# Patient Record
Sex: MALE | Race: WHITE | Employment: FULL TIME | URBAN - METROPOLITAN AREA
[De-identification: names, ages, dates, MRNs, and addresses within clinical notes are randomized per-mention and may not be internally consistent; named-entity substitution may affect disease eponyms.]

---

## 2021-01-06 ENCOUNTER — TRANSFERRED RECORDS (OUTPATIENT)
Dept: HEALTH INFORMATION MANAGEMENT | Facility: CLINIC | Age: 51
End: 2021-01-06

## 2021-01-06 ASSESSMENT — MIFFLIN-ST. JEOR: SCORE: 1662.78

## 2021-01-07 ENCOUNTER — TRANSFERRED RECORDS (OUTPATIENT)
Dept: HEALTH INFORMATION MANAGEMENT | Facility: CLINIC | Age: 51
End: 2021-01-07

## 2021-01-08 ENCOUNTER — TRANSFERRED RECORDS (OUTPATIENT)
Dept: HEALTH INFORMATION MANAGEMENT | Facility: CLINIC | Age: 51
End: 2021-01-08

## 2021-01-12 ENCOUNTER — PREP FOR PROCEDURE (OUTPATIENT)
Dept: CARDIOLOGY | Facility: CLINIC | Age: 51
End: 2021-01-12

## 2021-01-12 ENCOUNTER — HOSPITAL ENCOUNTER (INPATIENT)
Facility: CLINIC | Age: 51
Setting detail: SURGERY ADMIT
End: 2021-01-12
Attending: SURGERY | Admitting: SURGERY
Payer: COMMERCIAL

## 2021-01-12 DIAGNOSIS — Z72.0 TOBACCO ABUSE: ICD-10-CM

## 2021-01-12 DIAGNOSIS — I25.10 CAD (CORONARY ARTERY DISEASE): Primary | ICD-10-CM

## 2021-01-12 DIAGNOSIS — Z01.810 PRE-OPERATIVE CARDIOVASCULAR EXAMINATION: Primary | ICD-10-CM

## 2021-01-12 DIAGNOSIS — E11.9 DIABETES MELLITUS, TYPE 2 (H): ICD-10-CM

## 2021-01-12 DIAGNOSIS — R06.02 SOB (SHORTNESS OF BREATH): ICD-10-CM

## 2021-01-13 DIAGNOSIS — I25.5 ISCHEMIC CARDIOMYOPATHY: Primary | ICD-10-CM

## 2021-01-13 DIAGNOSIS — Z11.59 ENCOUNTER FOR SCREENING FOR OTHER VIRAL DISEASES: Primary | ICD-10-CM

## 2021-01-13 RX ORDER — METOPROLOL SUCCINATE 25 MG/1
50 TABLET, EXTENDED RELEASE ORAL EVERY MORNING
Status: ON HOLD | COMMUNITY
End: 2021-02-01

## 2021-01-13 RX ORDER — POLYETHYLENE GLYCOL 3350 17 G/17G
1 POWDER, FOR SOLUTION ORAL DAILY PRN
COMMUNITY
End: 2021-01-20

## 2021-01-13 RX ORDER — NITROGLYCERIN 0.4 MG/1
0.4 TABLET SUBLINGUAL EVERY 5 MIN PRN
Status: ON HOLD | COMMUNITY
End: 2021-02-03

## 2021-01-13 RX ORDER — PANTOPRAZOLE SODIUM 40 MG/1
40 TABLET, DELAYED RELEASE ORAL DAILY
COMMUNITY
End: 2021-01-20

## 2021-01-13 RX ORDER — LIDOCAINE 40 MG/G
CREAM TOPICAL
Status: CANCELLED | OUTPATIENT
Start: 2021-01-13

## 2021-01-13 RX ORDER — POTASSIUM CHLORIDE 1500 MG/1
40 TABLET, EXTENDED RELEASE ORAL
Status: CANCELLED | OUTPATIENT
Start: 2021-01-13

## 2021-01-13 RX ORDER — HYDROCODONE BITARTRATE AND ACETAMINOPHEN 5; 325 MG/1; MG/1
1 TABLET ORAL EVERY 6 HOURS PRN
COMMUNITY
End: 2021-01-20

## 2021-01-13 RX ORDER — POTASSIUM CHLORIDE 1500 MG/1
20 TABLET, EXTENDED RELEASE ORAL
Status: CANCELLED | OUTPATIENT
Start: 2021-01-13

## 2021-01-13 RX ORDER — MAGNESIUM HYDROXIDE/ALUMINUM HYDROXICE/SIMETHICONE 120; 1200; 1200 MG/30ML; MG/30ML; MG/30ML
30 SUSPENSION ORAL EVERY 4 HOURS PRN
COMMUNITY
End: 2021-01-20

## 2021-01-13 RX ORDER — ASPIRIN 81 MG/1
81 TABLET ORAL EVERY MORNING
Status: ON HOLD | COMMUNITY
End: 2021-02-03

## 2021-01-13 RX ORDER — LISINOPRIL 5 MG/1
10 TABLET ORAL EVERY MORNING
Status: ON HOLD | COMMUNITY
End: 2021-02-01

## 2021-01-13 RX ORDER — ATORVASTATIN CALCIUM 40 MG/1
40 TABLET, FILM COATED ORAL EVERY MORNING
Status: ON HOLD | COMMUNITY
End: 2021-02-03

## 2021-01-13 RX ORDER — SODIUM CHLORIDE 9 MG/ML
INJECTION, SOLUTION INTRAVENOUS CONTINUOUS
Status: CANCELLED | OUTPATIENT
Start: 2021-01-13

## 2021-01-13 RX ORDER — CLOPIDOGREL BISULFATE 75 MG/1
75 TABLET ORAL EVERY MORNING
Status: ON HOLD | COMMUNITY
End: 2021-02-01

## 2021-01-13 NOTE — PROGRESS NOTES
Sent pre op letter to patient's daughter Jonathan via email.  Will also send education packet via email and bring to clinic on 01/19.  Entered patient's history and known medications to patient's chart.

## 2021-01-13 NOTE — TELEPHONE ENCOUNTER
FUTURE VISIT INFORMATION      SURGERY INFORMATION:    Date: 2021    Location: [facility] OR    Surgeon:  Jose Gibbons MD    Anesthesia Type:  CORONARY ARTERY BYPASS GRAFT (CABG)    Procedure: General    Consult: N/A    RECORDS REQUESTED FROM:       Primary Care Provider: N/A    Pertinent Medical History: CAD    Most recent EKG+ Tracin2021 (Epic)    Most recent ECHO: 2021 ([facility]) [facility]    Most recent Cardiac Stress Test: N/A    Most recent Coronary Angiogram: N/A    Most recent PFT's: N/A     Most recent Sleep Study:  N/A

## 2021-01-13 NOTE — LETTER
January 13, 2021        Dear MARGARITO Tejeda [facility]    CARDIOTHORACIC SURGERY PRE-OP INSTRUCTIONS  Your Heart Surgery is scheduled with Dr Jose Gibbons   On Friday 01/22/21 at 7:30 am.  Please arrive at 5:30 am.      Report to the information desk in the front lobby of the hospital at [address], [place] [id number]. When you walk in the main entrance of the hospital it is directly in front of you. Ask for an escort or the  can help direct you. You will then be escorted or directed to  on the third floor for your surgery preparation.     At this time due to the Coronavirus, no one is allowed to accompany you into the hospital the day of surgery and no visitors will be allowed during your stay.    Visiting policies will be strictly enforced.  This policy is subject to change as the COVID virus continues to evolve.    COVID 19 TESTING  You will be required to undergo COVID 19 testing prior the angiogram and your surgery.  You will be contacted to schedule this test within 48 hours of your surgery.      After the COVID test please protect yourself by following the CDC recommendations for disease prevention.  Wash hands regularly with soap and water and use hand  if soap and water is not available, wear a face mask, separate yourself from others by 6 feet and keep your hands away from your face.      Call your surgery coordinator Sofia Hernandez RN or the afterhours number ([phone]) if you develop any of the following symptoms; fever, cough, shortness of breath, sore throat, runny or stuffy nose, muscle or body aches, headaches, fatigue, vomiting or diarrhea.      You will spend approximately 5 - 7 days in the hospital depending on how quickly you recover.      INSTRUCTIONS PRIOR TO SURGERY    Please review this letter and the enclosed booklet and other information in this surgery folder carefully and call Sofia Hernandez RN at [phone] with any questions or  concerns.      MEDICATIONS    ASPIRIN is okay to take up to the day before your surgery but NOT the day of your surgery. Take your other medications as you normally would until the day of surgery unless instructed otherwise. If you do not take aspirin do not start aspirin unless instructed otherwise.  TAKE YOUR LAST DOSE OF ASPIRIN ON Thursday January 21st.    IF you are taking a blood thinner, (Coumadin, Warfarin, Plavix, Pradaxa, Effient, Brilinta, Pletal, Eliquis, Xarelto or other antiplatelet),  please inform your surgery team as soon as possible as  these medications may need to be stopped for several days (3-7) prior your surgery.    Take your last dose of Plavix on Saturday January 16th.       STOP TAKING these medications. STOP ALL ANTIINFLAMMATORY MEDICATIONS: (Ibuprofen, Aleve, Advil, Celebrex, Votaren, Ketoprofen, and Naproxen).  Stop ALL SUPPLEMENTS 10 days prior to your surgery. This includes stopping Co-Q 10, vitamin E and all fish oil supplements.   Please check the labels on any OTC eye vitamins you may be taking.  They often contain vitamin E and should be stopped 10 days prior to surgery.      HISTORY AND PHYSICAL:  LABS and IMAGING  All tests and procedures must be within 30 days of your surgery date.     You do NOT need to fast for the blood work.      You have a pre-op clinic visit beginning at 1:00 pm on Tuesday 01/19 in the [facility], [address] [place] [address]. A  or Coordinator will assist you. The Pre Assessment/Anesthesia Clinic is on the fifth floor.  The laboratory and radiology is on the first floor.      Your schedule is as follows:    01/19  1:00 pm PFTs (Pulmonary Function Test)  2:00 pm Labs  2:30 pm Clinic appointment with Dr Gibbons    01/20  10:00 am Vein Mapping  11:00 am Carotid Ultra Sound  12:00 pm Chest X-Ray  12:40 pm CT of the chest without contrast    1:10 pm EKG    3:00 pm PAC (Pre Assessment and Anesthesia Clinic)  This is  your pre op physical          DIABETIC  INSTRUCTIONS  If your primary care has given you instructions please follow those.  Otherwise  No oral diabetic medication the morning of surgery.  If you take long acting insulin in the evening, only take half of your dose. We will check your glucose upon arrival.      DO NOT EAT ANY SOLID FOODS AFTER MIDNIGHT or the morning of your surgery. NO MILK, MILK PRODUCTS, SMOOTHIES 8 HOURS PRIOR TO SURGERY.      DO NOT EAT ANYTHING, however you may have any clear liquids; black coffee (sugar okay), clear tea, water, sprite, ginger ale, apple juice, Gatorade or any clear liquid up to two hours before your surgery time. (No carmelita tea) No milk, or milk products, no smoothies or juice with pulp.       MEDICATIONS THE MORNING OF SURGERY  Take your metoprolol (Toprol XL) the morning of your surgery with a drink of clear liquid.  Please tell your anesthesiologist what medication you took and at what time.     BELONGINGS  Do not bring personal belongings, jewelry, money, valuables, toiletries or medications to the hospital the morning of your surgery. You may pack a bag and give it to a family member or friend to bring the following day or when needed.   Please remove all jewelry, including body piercings. Cautery instruments are used during surgery that may pose a risk of burns if a body piercing is left in during surgery.     Do bring a photo ID and insurance card.  A copy of your health care directives, if you have one.  Glasses and hearing aids (bring cases).  You cannot wear contact lenses during the surgery.  Inhaler(s) and eye drops if you use them, tell the staff about them when you arrive. Bring your CPAP machine or breathing device, if you use them.  If you have a pacemaker or ICD (cardiac defibrillator) bring the ID card.  If you have an implanted stimulator, bring the remote control.  If you are a legal guardian bring a copy of the certified (court-stamped) guardianship papers.       Call Rebecca our patient , with any questions or concerns about scheduling. She can be reached by phone at [phone] between 8 AM and 4:30PM Monday through Friday. Our answering service will  calls outside of those business hours.   If you have questions about your medications, test results or have a change in your health status call Sofia Hernandez RN at [phone] during regular business hours.      On weekends or after 4:30 please call [phone] and ask the  to page the Cardiothoracic Fellow, Nurse Practitioner, Physician Assistant or Staff on call that weekend.       Please feel free to call me or our office with any questions.     Thank you,    Sofia Hernandez RN  Cardiothoracic Surgery Coordinator  [phone]  Direct Phone  [phone]  Fax

## 2021-01-15 VITALS — WEIGHT: 185 LBS | BODY MASS INDEX: 29.03 KG/M2 | HEIGHT: 67 IN

## 2021-01-15 DIAGNOSIS — I25.10 CAD (CORONARY ARTERY DISEASE): Primary | ICD-10-CM

## 2021-01-18 ENCOUNTER — PRE VISIT (OUTPATIENT)
Dept: CARDIOLOGY | Facility: CLINIC | Age: 51
End: 2021-01-18

## 2021-01-18 ENCOUNTER — ANESTHESIA EVENT (OUTPATIENT)
Dept: SURGERY | Facility: CLINIC | Age: 51
DRG: 235 | End: 2021-01-18
Payer: COMMERCIAL

## 2021-01-19 ENCOUNTER — OFFICE VISIT (OUTPATIENT)
Dept: CARDIOLOGY | Facility: CLINIC | Age: 51
End: 2021-01-19
Attending: SURGERY
Payer: COMMERCIAL

## 2021-01-19 VITALS
DIASTOLIC BLOOD PRESSURE: 74 MMHG | HEART RATE: 48 BPM | SYSTOLIC BLOOD PRESSURE: 124 MMHG | HEIGHT: 70 IN | OXYGEN SATURATION: 99 % | BODY MASS INDEX: 29.2 KG/M2 | WEIGHT: 204 LBS

## 2021-01-19 DIAGNOSIS — E11.9 DIABETES MELLITUS, TYPE 2 (H): ICD-10-CM

## 2021-01-19 DIAGNOSIS — Z72.0 TOBACCO ABUSE: ICD-10-CM

## 2021-01-19 DIAGNOSIS — R06.02 SOB (SHORTNESS OF BREATH): ICD-10-CM

## 2021-01-19 DIAGNOSIS — Z01.810 PRE-OPERATIVE CARDIOVASCULAR EXAMINATION: ICD-10-CM

## 2021-01-19 DIAGNOSIS — Z11.59 ENCOUNTER FOR SCREENING FOR OTHER VIRAL DISEASES: ICD-10-CM

## 2021-01-19 DIAGNOSIS — E11.9 TYPE 2 DIABETES MELLITUS WITHOUT COMPLICATION, WITHOUT LONG-TERM CURRENT USE OF INSULIN (H): ICD-10-CM

## 2021-01-19 DIAGNOSIS — I25.118 CORONARY ARTERY DISEASE INVOLVING NATIVE CORONARY ARTERY OF NATIVE HEART WITH OTHER FORM OF ANGINA PECTORIS (H): Primary | ICD-10-CM

## 2021-01-19 LAB
ALBUMIN SERPL-MCNC: 3.2 G/DL (ref 3.4–5)
ALBUMIN UR-MCNC: 30 MG/DL
ALP SERPL-CCNC: 100 U/L (ref 40–150)
ALT SERPL W P-5'-P-CCNC: 14 U/L (ref 0–70)
ANION GAP SERPL CALCULATED.3IONS-SCNC: 4 MMOL/L (ref 3–14)
APPEARANCE UR: CLEAR
APTT PPP: 30 SEC (ref 22–37)
AST SERPL W P-5'-P-CCNC: 8 U/L (ref 0–45)
BILIRUB DIRECT SERPL-MCNC: 0.3 MG/DL (ref 0–0.2)
BILIRUB SERPL-MCNC: 0.7 MG/DL (ref 0.2–1.3)
BILIRUB UR QL STRIP: NEGATIVE
BUN SERPL-MCNC: 8 MG/DL (ref 7–30)
CALCIUM SERPL-MCNC: 8.9 MG/DL (ref 8.5–10.1)
CHLORIDE SERPL-SCNC: 106 MMOL/L (ref 94–109)
CO2 SERPL-SCNC: 30 MMOL/L (ref 20–32)
COLOR UR AUTO: YELLOW
CREAT SERPL-MCNC: 0.65 MG/DL (ref 0.66–1.25)
ERYTHROCYTE [DISTWIDTH] IN BLOOD BY AUTOMATED COUNT: 12 % (ref 10–15)
GFR SERPL CREATININE-BSD FRML MDRD: >90 ML/MIN/{1.73_M2}
GLUCOSE SERPL-MCNC: 102 MG/DL (ref 70–99)
GLUCOSE UR STRIP-MCNC: NEGATIVE MG/DL
HBA1C MFR BLD: 11.2 % (ref 0–5.6)
HCT VFR BLD AUTO: 51.1 % (ref 40–53)
HGB BLD-MCNC: 16.4 G/DL (ref 13.3–17.7)
HGB UR QL STRIP: NEGATIVE
INR PPP: 1.22 (ref 0.86–1.14)
KETONES UR STRIP-MCNC: NEGATIVE MG/DL
LEUKOCYTE ESTERASE UR QL STRIP: NEGATIVE
MCH RBC QN AUTO: 30.9 PG (ref 26.5–33)
MCHC RBC AUTO-ENTMCNC: 32.1 G/DL (ref 31.5–36.5)
MCV RBC AUTO: 96 FL (ref 78–100)
MUCOUS THREADS #/AREA URNS LPF: PRESENT /LPF
NITRATE UR QL: NEGATIVE
PH UR STRIP: 5 PH (ref 5–7)
PLATELET # BLD AUTO: 224 10E9/L (ref 150–450)
POTASSIUM SERPL-SCNC: 3.4 MMOL/L (ref 3.4–5.3)
PREALB SERPL IA-MCNC: 15 MG/DL (ref 15–45)
PROT SERPL-MCNC: 7.2 G/DL (ref 6.8–8.8)
RBC # BLD AUTO: 5.3 10E12/L (ref 4.4–5.9)
RBC #/AREA URNS AUTO: 1 /HPF (ref 0–2)
SARS-COV-2 RNA RESP QL NAA+PROBE: NORMAL
SODIUM SERPL-SCNC: 141 MMOL/L (ref 133–144)
SOURCE: ABNORMAL
SP GR UR STRIP: 1.02 (ref 1–1.03)
SPECIMEN SOURCE: NORMAL
SQUAMOUS #/AREA URNS AUTO: <1 /HPF (ref 0–1)
UROBILINOGEN UR STRIP-MCNC: 0 MG/DL (ref 0–2)
WBC # BLD AUTO: 10.9 10E9/L (ref 4–11)
WBC #/AREA URNS AUTO: 4 /HPF (ref 0–5)

## 2021-01-19 PROCEDURE — 83036 HEMOGLOBIN GLYCOSYLATED A1C: CPT | Performed by: PATHOLOGY

## 2021-01-19 PROCEDURE — 99205 OFFICE O/P NEW HI 60 MIN: CPT | Performed by: SURGERY

## 2021-01-19 PROCEDURE — 85730 THROMBOPLASTIN TIME PARTIAL: CPT | Performed by: PATHOLOGY

## 2021-01-19 PROCEDURE — U0003 INFECTIOUS AGENT DETECTION BY NUCLEIC ACID (DNA OR RNA); SEVERE ACUTE RESPIRATORY SYNDROME CORONAVIRUS 2 (SARS-COV-2) (CORONAVIRUS DISEASE [COVID-19]), AMPLIFIED PROBE TECHNIQUE, MAKING USE OF HIGH THROUGHPUT TECHNOLOGIES AS DESCRIBED BY CMS-2020-01-R: HCPCS | Performed by: PATHOLOGY

## 2021-01-19 PROCEDURE — 80076 HEPATIC FUNCTION PANEL: CPT | Performed by: PATHOLOGY

## 2021-01-19 PROCEDURE — 84134 ASSAY OF PREALBUMIN: CPT | Performed by: PATHOLOGY

## 2021-01-19 PROCEDURE — 86923 COMPATIBILITY TEST ELECTRIC: CPT | Performed by: PATHOLOGY

## 2021-01-19 PROCEDURE — 36415 COLL VENOUS BLD VENIPUNCTURE: CPT | Performed by: PATHOLOGY

## 2021-01-19 PROCEDURE — 94726 PLETHYSMOGRAPHY LUNG VOLUMES: CPT | Performed by: INTERNAL MEDICINE

## 2021-01-19 PROCEDURE — 81001 URINALYSIS AUTO W/SCOPE: CPT | Performed by: PATHOLOGY

## 2021-01-19 PROCEDURE — 86900 BLOOD TYPING SEROLOGIC ABO: CPT | Performed by: PATHOLOGY

## 2021-01-19 PROCEDURE — U0005 INFEC AGEN DETEC AMPLI PROBE: HCPCS | Performed by: PATHOLOGY

## 2021-01-19 PROCEDURE — 80048 BASIC METABOLIC PNL TOTAL CA: CPT | Performed by: PATHOLOGY

## 2021-01-19 PROCEDURE — 85610 PROTHROMBIN TIME: CPT | Performed by: PATHOLOGY

## 2021-01-19 PROCEDURE — 94375 RESPIRATORY FLOW VOLUME LOOP: CPT | Performed by: INTERNAL MEDICINE

## 2021-01-19 PROCEDURE — 85027 COMPLETE CBC AUTOMATED: CPT | Performed by: PATHOLOGY

## 2021-01-19 PROCEDURE — G0463 HOSPITAL OUTPT CLINIC VISIT: HCPCS

## 2021-01-19 PROCEDURE — 86901 BLOOD TYPING SEROLOGIC RH(D): CPT | Performed by: PATHOLOGY

## 2021-01-19 PROCEDURE — 86850 RBC ANTIBODY SCREEN: CPT | Performed by: PATHOLOGY

## 2021-01-19 PROCEDURE — 94729 DIFFUSING CAPACITY: CPT | Performed by: INTERNAL MEDICINE

## 2021-01-19 SDOH — HEALTH STABILITY: MENTAL HEALTH: HOW OFTEN DO YOU HAVE A DRINK CONTAINING ALCOHOL?: 4 OR MORE TIMES A WEEK

## 2021-01-19 SDOH — HEALTH STABILITY: MENTAL HEALTH: HOW OFTEN DO YOU HAVE 6 OR MORE DRINKS ON ONE OCCASION?: NOT ASKED

## 2021-01-19 SDOH — HEALTH STABILITY: MENTAL HEALTH: HOW MANY STANDARD DRINKS CONTAINING ALCOHOL DO YOU HAVE ON A TYPICAL DAY?: NOT ASKED

## 2021-01-19 ASSESSMENT — MIFFLIN-ST. JEOR: SCORE: 1796.59

## 2021-01-19 ASSESSMENT — PAIN SCALES - GENERAL: PAINLEVEL: NO PAIN (0)

## 2021-01-19 NOTE — LETTER
1/19/2021      RE: Jaxon Melendez  [address] [address]       Dear Colleague,    Thank you for the opportunity to participate in the care of your patient, Jaxon Melendez, at the [facility]. Please see a copy of my visit note below.    HISTORY OF PRESENT ILLNESS:  I was requested to se patient for evaluation of his suItability for CABG,Patient with history of HLD, HTN, DM II, and CAD s/p PCI to RCA and LCx in 2010. He continued to smoke. He was more recently seen with retrosternal chest pain excerbated by exertion, relieved with rest. Troponins were elevated 25-69. He was treated for acute coronary syndrome, and taken to the cath lab where he was found to have severe multi vessel disease including in-stent restenosis. His EF was approximately 10%.      His history is also significant for hidradenitis suppurativa in the presacral area and regular alcohol use 6-7 drinks daily.      PAST MEDICAL HISTORY:  Reviewed with patient on 01/21/2021        Past Medical History:   Diagnosis Date     Acute systolic heart failure (H)       EF 10%     CAD (coronary artery disease)       Diabetes mellitus, type 2 (H)       uncontrolled     Essential hypertension       Hidradenitis suppurativa       presacral area     Hyperlipidemia LDL goal <100       NSTEMI (non-ST elevated myocardial infarction) (H)                 Past Surgical History:   Procedure Laterality Date     ANGIOGRAM         2010, 2021         MEDICATIONS:  PTA Meds         Prior to Admission medications    Medication Sig Last Dose Taking? Auth Provider   Acetaminophen (TYLENOL) 325 MG CAPS Take 325-650 mg by mouth every 4 hours as needed Past Month at Unknown time Yes Reported, Patient   aspirin 81 MG EC tablet Take 81 mg by mouth every morning  1/20/2021 at 0800 Yes Reported, Patient   atorvastatin (LIPITOR) 40 MG tablet Take 40 mg by mouth every morning  1/20/2021 at 0800  Yes Reported, Patient   insulin glargine (LANTUS PEN) 100 UNIT/ML pen Inject 25 Units Subcutaneous every evening  1/20/2021 at 1800 Yes Reported, Patient   insulin lispro (HUMALOG KWIKPEN) 100 UNIT/ML (1 unit dial) KWIKPEN Inject 10 Units Subcutaneous 3 times daily (before meals) 1/20/2021 at 1800 Yes Reported, Patient   lisinopril (ZESTRIL) 5 MG tablet Take 10 mg by mouth every morning  1/20/2021 at 0800 Yes Reported, Patient   metFORMIN (GLUCOPHAGE) 500 MG tablet Take 500 mg by mouth daily (with breakfast) 1/20/2021 at 0800 Yes Reported, Patient   metoprolol succinate ER (TOPROL-XL) 25 MG 24 hr tablet Take 50 mg by mouth every morning  1/20/2021 at 0800 Yes Reported, Patient   clopidogrel (PLAVIX) 75 MG tablet Take 75 mg by mouth every morning Pt states he is not taking this medication.  CTRN  1/21/21 Unknown at Unknown time   Reported, Patient   dulaglutide (TRULICITY) 0.75 MG/0.5ML pen Inject 0.75 mg Subcutaneous every 7 days Pt is unaware of this med.  He is not taking. Unknown at Unknown time   Reported, Patient   nitroGLYcerin (NITROSTAT) 0.4 MG sublingual tablet Place 0.4 mg under the tongue every 5 minutes as needed for chest pain For chest pain place 1 tablet under the tongue every 5 minutes for 3 doses. If symptoms persist 5 minutes after 1st dose call 911. Unknown at Unknown time   Reported, Patient      Current Meds    [START ON 1/22/2021] aspirin  81 mg Oral QAM     [START ON 1/22/2021] atorvastatin  40 mg Oral QAM     atropine           heparin ANTICOAGULANT  5,000 Units Subcutaneous Q12H     insulin aspart  1-7 Units Subcutaneous TID AC     insulin aspart  1-5 Units Subcutaneous At Bedtime     insulin glargine  10 Units Subcutaneous At Bedtime     [START ON 1/22/2021] metoprolol succinate ER  50 mg Oral QAM     sodium chloride (PF)  3 mL Intracatheter Q8H      Infusion Meds    - MEDICATION INSTRUCTIONS -       Reason anticoagulation order not selected           ALLERGIES:    No Known  Allergies     REVIEW OF SYSTEMS:  A 10 point review of systems was negative other than mentioned in history and physical exam.     SOCIAL HISTORY:   Social History            Socioeconomic History     Marital status:        Spouse name: Not on file     Number of children: Not on file     Years of education: Not on file     Highest education level: Not on file   Occupational History     Not on file   Social Needs     Financial resource strain: Not on file     Food insecurity       Worry: Not on file       Inability: Not on file     Transportation needs       Medical: Not on file       Non-medical: Not on file   Tobacco Use     Smoking status: Current Every Day Smoker       Packs/day: 1.00       Years: 25.00       Pack years: 25.00       Types: Cigarettes     Smokeless tobacco: Never Used     Tobacco comment: Now down to 1/2 PPD smoking   Substance and Sexual Activity     Alcohol use: Yes       Comment: 6-7 mixed drinks nightly     Drug use: Never     Sexual activity: Not on file   Lifestyle     Physical activity       Days per week: Not on file       Minutes per session: Not on file     Stress: Not on file   Relationships     Social connections       Talks on phone: Not on file       Gets together: Not on file       Attends Voodoo service: Not on file       Active member of club or organization: Not on file       Attends meetings of clubs or organizations: Not on file       Relationship status: Not on file     Intimate partner violence       Fear of current or ex partner: Not on file       Emotionally abused: Not on file       Physically abused: Not on file       Forced sexual activity: Not on file   Other Topics Concern     Not on file   Social History Narrative     Not on file            FAMILY MEDICAL HISTORY:         Family History   Problem Relation Age of Onset     Brain Tumor Mother       Heart Disease Other       Diabetes Other              PHYSICAL EXAM:   Temp  Av.3  F (36.8  C)  Min: 98.1   F (36.7  C)  Max: 98.4  F (36.9  C)      Pulse  Av.5  Min: 50  Max: 59 Resp  Av  Min: 12  Max: 16  SpO2  Av %  Min: 96 %  Max: 98 %       /89 (BP Location: Right arm)   Pulse (!) 47   Resp 16   SpO2 97%       GENERAL APPEARANCE: Alert and NAD  Head: NC/AT  NECK: Supple, no goiter  Pulmonary: Lungs clear to auscultation with equal breath sounds bilaterally, no clubbing or cyanosis  CV: Sinus bradycardia , normal rate, no rub.   GI: Soft, nontender, normal bowel sounds, no HSM   SKIN: No rash, warm, dry  NEURO: Mentation intact and speech normal.      LABS:   CMP      Recent Labs   Lab 21  1416      POTASSIUM 3.4   CHLORIDE 106   CO2 30   ANIONGAP 4   *   BUN 8   CR 0.65*   GFRESTIMATED >90   GFRESTBLACK >90   RICHARD 8.9   PROTTOTAL 7.2   ALBUMIN 3.2*   BILITOTAL 0.7   ALKPHOS 100   AST 8   ALT 14      CBC      Recent Labs   Lab 21  1416   HGB 16.4   WBC 10.9   RBC 5.30   HCT 51.1   MCV 96   MCH 30.9   MCHC 32.1   RDW 12.0         INR      Recent Labs   Lab 21  1416   INR 1.22*   PTT 30      ABGNo lab results found in last 7 days.      IMAGING:     CT chest without contrast     1. Mild cardiomegaly with left ventricular enlargement and evidence of  prior myocardial infarction/s. Severe coronary artery calcifications.  2. No acute airspace disease.     EKG  Sinus bradycardia, normal axis, poor R wave progression          ASSESSMENT AND PLAN:   50  Year old male with history of HLD, HTN, DM II, smoking, and CAD s/p PCI to RCA and LCx in , now with severe CAD with severe LV dysfunction. Patient was referred to me by Dr Houston for high risk CABG. On close review of his MRI, there is adequate viability of LAD and circumflex territory to support doing CABG. I discussed the risks and benefits of CABG with patient and daughter including risks of death, bleeding, stroke, infection, renal failure, arrhythmias and the need for mechanical circulatory support. They  understand and are willing for me to proceed with surgery. Will admit to ICU for swan and IABP prior to surgery.    I spent a total of 65 minutes which included patient visit, review of outside tests and imaging and interpretation of tests, discussion with referral cardiologist and documentation.      Please do not hesitate to contact me if you have any questions/concerns.     Sincerely,     Jose Gibbons MD

## 2021-01-19 NOTE — NURSING NOTE
Patient seen today for consultation for CABG with low EF, high A1C and current everyday smoker.    Surgery procedure explained to patient and his daughter Manda and all questions and concerns were answered and addressed.     Risks and benefits of surgery were discussed with patient (and all present) including risk of death, stroke, bleeding, cardiac ischemia, wound infection, renal failure, arrhythmias and possible pacemaker implantation. Patient accepts these risks and is willing to proceed with surgery.     Reviewed pre surgery tests and procedures needed and patient will complete tests tomorrow.     Pre surgery preparation folder with instructions for surgery preparation emailed to patient and daughter.     Instructed patient on preparation for balloon pump on Thursday.     Virtual visit set up with [facility]ah representative via video phone and advanced directives were signed and emailed to Honoring Sofi.     Patient and daughter verbalized understanding of all instructions and will call with any questions or concerns.

## 2021-01-19 NOTE — NURSING NOTE
Chief Complaint   Patient presents with     New Patient     New consult for CABG with EF 15%     Rhoda Figueroa

## 2021-01-20 ENCOUNTER — TELEPHONE (OUTPATIENT)
Dept: CARDIOLOGY | Facility: CLINIC | Age: 51
End: 2021-01-20

## 2021-01-20 ENCOUNTER — PRE VISIT (OUTPATIENT)
Dept: SURGERY | Facility: CLINIC | Age: 51
End: 2021-01-20

## 2021-01-20 ENCOUNTER — OFFICE VISIT (OUTPATIENT)
Dept: SURGERY | Facility: CLINIC | Age: 51
End: 2021-01-20
Payer: COMMERCIAL

## 2021-01-20 ENCOUNTER — ANCILLARY PROCEDURE (OUTPATIENT)
Dept: GENERAL RADIOLOGY | Facility: CLINIC | Age: 51
End: 2021-01-20
Attending: SURGERY
Payer: COMMERCIAL

## 2021-01-20 ENCOUNTER — ANCILLARY PROCEDURE (OUTPATIENT)
Dept: CT IMAGING | Facility: CLINIC | Age: 51
End: 2021-01-20
Attending: SURGERY
Payer: COMMERCIAL

## 2021-01-20 ENCOUNTER — ANCILLARY PROCEDURE (OUTPATIENT)
Dept: ULTRASOUND IMAGING | Facility: CLINIC | Age: 51
End: 2021-01-20
Attending: SURGERY
Payer: COMMERCIAL

## 2021-01-20 ENCOUNTER — DOCUMENTATION ONLY (OUTPATIENT)
Dept: OTHER | Facility: CLINIC | Age: 51
End: 2021-01-20

## 2021-01-20 VITALS
HEART RATE: 50 BPM | BODY MASS INDEX: 29.06 KG/M2 | DIASTOLIC BLOOD PRESSURE: 72 MMHG | WEIGHT: 203 LBS | TEMPERATURE: 98.1 F | RESPIRATION RATE: 12 BRPM | OXYGEN SATURATION: 98 % | SYSTOLIC BLOOD PRESSURE: 108 MMHG | HEIGHT: 70 IN

## 2021-01-20 DIAGNOSIS — Z01.810 PRE-OPERATIVE CARDIOVASCULAR EXAMINATION: ICD-10-CM

## 2021-01-20 DIAGNOSIS — Z01.818 PREOP EXAMINATION: Primary | ICD-10-CM

## 2021-01-20 LAB
DLCOUNC-%PRED-PRE: 84 %
DLCOUNC-PRE: 24.88 ML/MIN/MMHG
DLCOUNC-PRED: 29.54 ML/MIN/MMHG
ERV-%PRED-PRE: 72 %
ERV-PRE: 0.9 L
ERV-PRED: 1.24 L
EXPTIME-PRE: 6.82 SEC
FEF2575-%PRED-PRE: 111 %
FEF2575-PRE: 4.07 L/SEC
FEF2575-PRED: 3.64 L/SEC
FEFMAX-%PRED-PRE: 94 %
FEFMAX-PRE: 9.3 L/SEC
FEFMAX-PRED: 9.87 L/SEC
FEV1-%PRED-PRE: 78 %
FEV1-PRE: 3.09 L
FEV1FEV6-PRE: 85 %
FEV1FEV6-PRED: 81 %
FEV1FVC-PRE: 86 %
FEV1FVC-PRED: 79 %
FEV1SVC-PRE: 83 %
FEV1SVC-PRED: 76 %
FIFMAX-PRE: 4.73 L/SEC
FRCPLETH-%PRED-PRE: 74 %
FRCPLETH-PRE: 2.62 L
FRCPLETH-PRED: 3.51 L
FVC-%PRED-PRE: 72 %
FVC-PRE: 3.61 L
FVC-PRED: 5.02 L
IC-%PRED-PRE: 70 %
IC-PRE: 2.81 L
IC-PRED: 4 L
LABORATORY COMMENT REPORT: NORMAL
RADIOLOGIST FLAGS: ABNORMAL
RVPLETH-%PRED-PRE: 78 %
RVPLETH-PRE: 1.72 L
RVPLETH-PRED: 2.18 L
SARS-COV-2 RNA RESP QL NAA+PROBE: NEGATIVE
SPECIMEN SOURCE: NORMAL
TLCPLETH-%PRED-PRE: 76 %
TLCPLETH-PRE: 5.43 L
TLCPLETH-PRED: 7.13 L
VA-%PRED-PRE: 78 %
VA-PRE: 5.18 L
VC-%PRED-PRE: 70 %
VC-PRE: 3.71 L
VC-PRED: 5.24 L

## 2021-01-20 PROCEDURE — 71046 X-RAY EXAM CHEST 2 VIEWS: CPT | Performed by: RADIOLOGY

## 2021-01-20 PROCEDURE — 93970 EXTREMITY STUDY: CPT | Performed by: RADIOLOGY

## 2021-01-20 PROCEDURE — 99204 OFFICE O/P NEW MOD 45 MIN: CPT | Performed by: CLINICAL NURSE SPECIALIST

## 2021-01-20 PROCEDURE — 71250 CT THORAX DX C-: CPT | Performed by: RADIOLOGY

## 2021-01-20 PROCEDURE — 93880 EXTRACRANIAL BILAT STUDY: CPT | Performed by: RADIOLOGY

## 2021-01-20 RX ORDER — ASPIRIN 81 MG/1
81 TABLET, CHEWABLE ORAL
Status: CANCELLED | OUTPATIENT
Start: 2021-01-20

## 2021-01-20 RX ORDER — GABAPENTIN 300 MG/1
300 CAPSULE ORAL
Status: CANCELLED | OUTPATIENT
Start: 2021-01-20

## 2021-01-20 RX ORDER — LIDOCAINE 40 MG/G
CREAM TOPICAL
Status: CANCELLED | OUTPATIENT
Start: 2021-01-20

## 2021-01-20 RX ORDER — ASPIRIN 81 MG/1
162 TABLET, CHEWABLE ORAL
Status: CANCELLED | OUTPATIENT
Start: 2021-01-20

## 2021-01-20 RX ORDER — CEFAZOLIN SODIUM 1 G/50ML
1 INJECTION, SOLUTION INTRAVENOUS SEE ADMIN INSTRUCTIONS
Status: CANCELLED | OUTPATIENT
Start: 2021-01-20

## 2021-01-20 RX ORDER — FAMOTIDINE 20 MG/1
20 TABLET, FILM COATED ORAL
Status: CANCELLED | OUTPATIENT
Start: 2021-01-20

## 2021-01-20 RX ORDER — ACETAMINOPHEN 325 MG/1
975 TABLET ORAL ONCE
Status: CANCELLED | OUTPATIENT
Start: 2021-01-20 | End: 2021-01-20

## 2021-01-20 RX ORDER — CEFAZOLIN SODIUM 2 G/50ML
2 SOLUTION INTRAVENOUS
Status: CANCELLED | OUTPATIENT
Start: 2021-01-20

## 2021-01-20 RX ORDER — DEXMEDETOMIDINE HYDROCHLORIDE 4 UG/ML
0.2-1.2 INJECTION, SOLUTION INTRAVENOUS CONTINUOUS
Status: CANCELLED | OUTPATIENT
Start: 2021-01-20

## 2021-01-20 RX ORDER — CHLORHEXIDINE GLUCONATE ORAL RINSE 1.2 MG/ML
10 SOLUTION DENTAL ONCE
Status: CANCELLED | OUTPATIENT
Start: 2021-01-20 | End: 2021-01-20

## 2021-01-20 ASSESSMENT — PAIN SCALES - GENERAL: PAINLEVEL: NO PAIN (0)

## 2021-01-20 ASSESSMENT — MIFFLIN-ST. JEOR: SCORE: 1792.05

## 2021-01-20 ASSESSMENT — LIFESTYLE VARIABLES: TOBACCO_USE: 1

## 2021-01-20 NOTE — TELEPHONE ENCOUNTER
Called and spoke to imaging services and this is in regard to patient's L side carotid artery being > 70% blocked.  Imaging staff also stated they spoke to Dr Gibbons regarding these findings.  Dr Gibbons spoke to Dr Campbell and he recommends going forward with patient's CABG.

## 2021-01-20 NOTE — ANESTHESIA PREPROCEDURE EVALUATION
"Anesthesia Pre-Procedure Evaluation    Patient: Jaxon Melendez   MRN:     [id number] Gender:   male   Age:    50 year old :      1970        Preoperative Diagnosis: CAD (coronary artery disease) [I25.10]   Procedure(s):  CORONARY ARTERY BYPASS GRAFT (CABG)     LABS:  CBC:   Lab Results   Component Value Date    WBC 10.9 2021    HGB 16.4 2021    HCT 51.1 2021     2021     BMP:   Lab Results   Component Value Date     2021    POTASSIUM 3.4 2021    CHLORIDE 106 2021    CO2 30 2021    BUN 8 2021    CR 0.65 (L) 2021     (H) 2021     COAGS:   Lab Results   Component Value Date    PTT 30 2021    INR 1.22 (H) 2021     POC: No results found for: BGM, HCG, HCGS  OTHER:   Lab Results   Component Value Date    A1C 11.2 (H) 2021    RICHARD 8.9 2021    ALBUMIN 3.2 (L) 2021    PROTTOTAL 7.2 2021    ALT 14 2021    AST 8 2021    ALKPHOS 100 2021    BILITOTAL 0.7 2021        Preop Vitals    BP Readings from Last 3 Encounters:   21 124/74    Pulse Readings from Last 3 Encounters:   21 (!) 48      Resp Readings from Last 3 Encounters:   No data found for Resp    SpO2 Readings from Last 3 Encounters:   21 99%      Temp Readings from Last 1 Encounters:   No data found for Temp    Ht Readings from Last 1 Encounters:   21 1.778 m (5' 10\")      Wt Readings from Last 1 Encounters:   21 92.5 kg (204 lb)    Estimated body mass index is 29.27 kg/m  as calculated from the following:    Height as of 21: 1.778 m (5' 10\").    Weight as of 21: 92.5 kg (204 lb).     LDA:        Past Medical History:   Diagnosis Date     Acute systolic heart failure (H)     EF 10%     CAD (coronary artery disease)      Diabetes mellitus, type 2 (H)     uncontrolled     Essential hypertension      Hidradenitis suppurativa     presacral area     Hyperlipidemia LDL goal <100      " NSTEMI (non-ST elevated myocardial infarction) (H)       No past surgical history on file.   No Known Allergies     Anesthesia Evaluation     . Pt has had prior anesthetic. Type: MAC.    No history of anesthetic complications          ROS/MED HX    ENT/Pulmonary:     (+) MING risk factors, hypertension, tobacco use, Current use,  (-) recent URI   Neurologic:  - neg neurologic ROS     Cardiovascular: Comment: Walked from hospital to [facility] today in cold and had some shortness of breath and mild chest pain.     (+) Dyslipidemia hypertension-range: 108/72 today/ Peripheral Vascular Disease-- Other. CAD -past MI -stent-2010. 2 Taking blood thinners Pt has received instructions: Instructions Given to patient: Last dose of ASA 1/21, last dose of Plavix 1/16/21. CHF etiology: ischemic Last EF: 10-15% date: 1/6/21 JAVED. Previous cardiac testing   Echo: Date: 1/6/21 Results:    Stress Test: Date: Results:    ECG Reviewed: Date: 1/20/21 Results:  SB, nonspecific intraventricular conduction block, T wave abnormality  Cath: Date: 1/7/21 Results:        METS/Exercise Tolerance:  3 - Able to walk 1-2 blocks without stopping   Hematologic:  - neg hematologic  ROS       Musculoskeletal:  - neg musculoskeletal ROS       GI/Hepatic:     (+) GERD, Asymptomatic on medication,       Renal/Genitourinary:  - neg Renal ROS       Endo:     (+) type II DM, Last HgA1c: 11.2, date: 1/20/21, Using insulin, - not using insulin pump. Normal glucose range: doesn't monitor regularly, this am 100, Diabetic complications: cardiac problems.    (-) Type I DM   Psychiatric:  - neg psychiatric ROS    (-) chronic opioid use history   Infectious Disease:  - neg infectious disease ROS       Malignancy:       Other:    (+) , no H/O Chronic Pain,                       PHYSICAL EXAM:   Mental Status/Neuro: A/A/O; Age Appropriate   Airway: Facies: Feasible  Mallampati: I  Mouth/Opening: Full  TM distance: > 6 cm  Neck ROM: Full   Respiratory: Auscultation: CTAB      Resp. Rate: Normal     Resp. Effort: Normal      CV: Rhythm: Regular  Heart: Normal Sounds  Edema: None   Comments:      Dental: Normal Dentition                Assessment:   ASA SCORE: 3    H&P: History and physical reviewed and following examination; no interval change.    NPO Status: NPO Appropriate     Plan:   Anes. Type:  General   Pre-Medication: None   Induction:  IV (Standard)   Airway: ETT; Oral   Access/Monitoring: PIV; 2nd PIV; A-Line; PAC; CVL   Maintenance: Balanced     Postop Plan:   Postop Pain: Opioids  Postop Sedation/Airway: Not planned     PONV Management:   Adult Risk Factors:, Postop Opioids   Prevention: Ondansetron     CONSENT: Direct conversation   Plan and risks discussed with: Patient   Blood Products: Consented (ALL Blood Products)             [unfilled]  PAC Discussion and Assessment    ASA Classification: 3  Case is suitable for: Mabie  Anesthetic techniques and relevant risks discussed: GA and GA with regional block for post-op pain control  Invasive monitoring and risk discussed: No    Possibility and Risk of blood transfusion discussed: Yes            PAC Resident/NP Anesthesia Assessment: Jaxon Melendez is a 50 year old male scheduled to undergo CORONARY ARTERY BYPASS GRAFT (CABG) with Dr. Gibbons on 1/22/21. He has the following specific operative considerations:   - RCRI : High risk surgery (>5% cardiac complication risk).    - VTE risk: 0.5%  - MING # of risks 2/8 = Low risk  - Risk of PONV score = 1.  If > 2, anti-emetic intervention recommended.    --CAD s/p PCI to RCA and LCx in 2010, now with STEMI and angiogram with multivessel disease and in-stent restenosis. EF ~10%. Has been counseled for above procedure with plan for IABP day before surgery. ASA 81 mg daily and will remain on up to DOS. Last dose of Plavix 1/16/21. DYSPNEA ON EXERTION and mild chest pain when walking in cold today.   --HLD atorvastatin. HTN. lisinopril. metoprolol. BP today 108/72. All testing  above. 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   --Current smoker now 1/2 PPD with history of 25 pack years. Denies pulmonary symptoms. Lungs CTA.   --GERD. Will take Protonix on DOS.  --DIABETES MELLITUS II. New diagnosis. Last A1C 11.2. Humalog insulin 10 units with meals, metformin and Lantus insulin 25 units at 6 pm. Is not monitoring blood sugar regularly. Denies use of Trulicity today.   --Regular alcohol use. 6-7 mixed drinks nightly. Denies history of withdrawal. LFTs normal. INR 1.22.  --Type and screen drawn by service.     Patient was discussed with Dr Hung.    Reviewed and Signed by PAC Mid-Level Provider/Resident  Mid-Level Provider/Resident: LOUIS Perez, MAXI  Date: 1/20/21  Time: 12:08pm                                LOUIS Warren

## 2021-01-20 NOTE — TELEPHONE ENCOUNTER
[facility] Call Center    Phone Message    May a detailed message be left on voicemail: yes     Reason for Call: Other: Stefanie from imaging would like a call back to discuss a finding     Action Taken: Message routed to:  [facility] ([facility]): Cardio    Travel Screening: Not Applicable

## 2021-01-20 NOTE — PATIENT INSTRUCTIONS
Preparing for Your Surgery      Name:  Jaxon Melendez   MRN:  [id number]   :  1970   Today's Date:  2021       Arriving for surgery:  Surgery date:  2021  Arrival time:  5:30 am    Please note---you will admitted to the hospital after Your Cardiac Cath lab appointment on 21    Restrictions due to COVID 19:  One consistent visitor per patient is allowed  No ill visitors  All visitors must wear face mask     parking is available for anyone with mobility limitations or disabilities.  ([facility]  24 hours/ 7 days a week; [employer]  7 am- 3:30 pm, Mon- Fri)    Please come to:       [facility] [address]  [address] [id number]       -    Please proceed to the Surgery Lounge on the 3rd floor. [phone]?     - ?If you are in need of directions, wheelchair or escort please stop at the Information Desk in the lobby.  ?     What can I eat or drink?  -  You may eat and drink normally for up to 8 hours before your surgery. (Until 11 pm)  -  You may have clear liquids until 2 hours before surgery. (Until 5:30 am )    Examples of clear liquids:  Water  Clear broth  Juices (apple, white grape, white cranberry  and cider) without pulp  Noncarbonated, powder based beverages  (lemonade and Tyler-Aid)  Sodas (Sprite, 7-Up, ginger ale and seltzer)  Coffee or tea (without milk or cream)  Gatorade    -  No Alcohol for at least 24 hours before surgery     Which medicines can I take?    Hold Multivitamins for 7 days before surgery.  Hold Supplements for 7 days before surgery.  Hold Ibuprofen (Advil, Motrin) for 1 day before surgery--unless otherwise directed by surgeon.  Hold Naproxen (Aleve) for 4 days before surgery.    Take last dose of aspirin on 21    Plan for Plavix (clopidigrel)---take last dose on 2021          How do I prepare myself?  - Please take 2 showers before surgery using Scrubcare or Hibiclens soap.    Use this soap only from the  neck to your toes.     Leave the soap on your skin for one minute--then rinse thoroughly.      You may use your own shampoo and conditioner; no other hair products.   - Please remove all jewelry and body piercings.  - No lotions, deodorants or fragrance.  - No makeup or fingernail polish.   - Bring your ID and insurance card.    - All patients are required to have a Covid-19 test within 4 days of surgery/procedure.      -Patients will be contacted by the [facility] scheduling team within 1 week of surgery to make an appointment.      - Patients may call the Scheduling team at [phone] if they have not been scheduled within 4 days of  surgery.      Questions or Concerns:    - For any questions regarding the day of surgery or your hospital stay, please contact the Pre Admission Nursing Office at [phone].       - If you have health changes between today and your surgery please call your surgeon.       For questions after surgery please call your surgeons office.     Sofia Hernandez RN  Cardiothoracic Surgery Coordinator  [phone]  Direct Phone  [phone]  Fax

## 2021-01-20 NOTE — TELEPHONE ENCOUNTER
I notified Sofia PRIEST RN for Dr Gibbons regarding this message and she will contact Debbi. I will also route this message to the Surgery pool for further review.    Awais CLINTON

## 2021-01-20 NOTE — H&P
Pre-Operative H & P     CC:  Preoperative exam to assess for increased cardiopulmonary risk while undergoing surgery and anesthesia.    Date of Encounter: 1/20/2021  Primary Care Physician:  No primary care provider on file.  Reason for visit: CAD (coronary artery disease) [I25.10]    HPI  Jaxon Melendez is a 50 year old male who presents for pre-operative H & P in preparation for CORONARY ARTERY BYPASS GRAFT (CABG) with Dr. Gibbons on 1/22/21 at [facility]. History is obtained from the patient and medical records.     Patient with history of HLD, HTN, DM II, and CAD s/p PCI to RCA and LCx in 2010. He continued to smoke. He was more recently seen with retrosternal chest pain excerbated by exertion, relieved with rest. Troponins were elevated 25-69. He was treated for acute coronary syndrome, and taken to the cath lab where he was found to have severe multi vessel disease including in-stent restenosis. His EF was approximately 10%. Due to potential need for extensive mechanical circulatory support he was referred to Dr. Gibbons and has been counseled for above procedure. The plan includes admission the day prior to surgery for placement of IABP.    His history is also significant for hidradenitis suppurativa in the presacral area and regular alcohol use 6-7 drinks daily.     Today patient denies fever, cough, irregular HR, or ankle edema. He had testing today and reports walking from the hospital to the [facility] in the cold. He experienced some shortness of breath in the cold with some mild chest pain similar to what he has had in the past. 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     Past Medical History  Past Medical History:   Diagnosis Date     Acute systolic heart failure (H)     EF 10%     CAD (coronary artery disease)      Diabetes mellitus, type 2 (H)     uncontrolled     Essential hypertension      Hidradenitis suppurativa     presacral area     Hyperlipidemia LDL  goal <100      NSTEMI (non-ST elevated myocardial infarction) (H)        Past Surgical History  Past Surgical History:   Procedure Laterality Date     ANGIOGRAM      2010, 2021       Hx of Blood transfusions/reactions: Denies.      Hx of abnormal bleeding or anti-platelet use: ASA 81 mg daily. Plavix 75 mg daily.     Menstrual history: No LMP for male patient.    Steroid use in the last year: Denies.     Personal or FH with difficulty with Anesthesia:  Denies.     Prior to Admission Medications  Current Outpatient Medications   Medication Sig Dispense Refill     Acetaminophen (TYLENOL) 325 MG CAPS Take 325-650 mg by mouth every 4 hours as needed       aspirin 81 MG EC tablet Take 81 mg by mouth every morning        atorvastatin (LIPITOR) 40 MG tablet Take 40 mg by mouth every morning        clopidogrel (PLAVIX) 75 MG tablet Take 75 mg by mouth every morning        insulin glargine (LANTUS PEN) 100 UNIT/ML pen Inject 25 Units Subcutaneous every evening        lisinopril (ZESTRIL) 5 MG tablet Take 5 mg by mouth every morning        metFORMIN (GLUCOPHAGE) 500 MG tablet Take 500 mg by mouth daily (with breakfast)       metoprolol succinate ER (TOPROL-XL) 25 MG 24 hr tablet Take 25 mg by mouth every morning        nitroGLYcerin (NITROSTAT) 0.4 MG sublingual tablet Place 0.4 mg under the tongue every 5 minutes as needed for chest pain For chest pain place 1 tablet under the tongue every 5 minutes for 3 doses. If symptoms persist 5 minutes after 1st dose call 911.       dulaglutide (TRULICITY) 0.75 MG/0.5ML pen Inject 0.75 mg Subcutaneous every 7 days          Allergies  No Known Allergies    Social History  Social History     Socioeconomic History     Marital status:      Spouse name: Not on file     Number of children: Not on file     Years of education: Not on file     Highest education level: Not on file   Occupational History     Not on file   Social Needs     Financial resource strain: Not on file     Food  insecurity     Worry: Not on file     Inability: Not on file     Transportation needs     Medical: Not on file     Non-medical: Not on file   Tobacco Use     Smoking status: Current Every Day Smoker     Packs/day: 1.00     Years: 25.00     Pack years: 25.00     Types: Cigarettes     Smokeless tobacco: Never Used     Tobacco comment: Now down to 1/2 PPD smoking   Substance and Sexual Activity     Alcohol use: Yes     Comment: 6-7 mixed drinks nightly     Drug use: Never     Sexual activity: Not on file   Lifestyle     Physical activity     Days per week: Not on file     Minutes per session: Not on file     Stress: Not on file   Relationships     Social connections     Talks on phone: Not on file     Gets together: Not on file     Attends Yazdanism service: Not on file     Active member of club or organization: Not on file     Attends meetings of clubs or organizations: Not on file     Relationship status: Not on file     Intimate partner violence     Fear of current or ex partner: Not on file     Emotionally abused: Not on file     Physically abused: Not on file     Forced sexual activity: Not on file   Other Topics Concern     Not on file   Social History Narrative     Not on file       Family History  Family History   Problem Relation Age of Onset     Brain Tumor Mother      Heart Disease Other      Diabetes Other      Personally reviewed    LABS:  CBC:   Lab Results   Component Value Date    WBC 10.9 01/19/2021    HGB 16.4 01/19/2021    HCT 51.1 01/19/2021     01/19/2021     BMP:   Lab Results   Component Value Date     01/19/2021    POTASSIUM 3.4 01/19/2021    CHLORIDE 106 01/19/2021    CO2 30 01/19/2021    BUN 8 01/19/2021    CR 0.65 (L) 01/19/2021     (H) 01/19/2021     COAGS:   Lab Results   Component Value Date    PTT 30 01/19/2021    INR 1.22 (H) 01/19/2021     POC: No results found for: BGM, HCG, HCGS  OTHER:   Lab Results   Component Value Date    A1C 11.2 (H) 01/19/2021    RICHARD 8.9  "01/19/2021    ALBUMIN 3.2 (L) 01/19/2021    PROTTOTAL 7.2 01/19/2021    ALT 14 01/19/2021    AST 8 01/19/2021    ALKPHOS 100 01/19/2021    BILITOTAL 0.7 01/19/2021        Preop Vitals    BP Readings from Last 3 Encounters:   01/19/21 124/74    Pulse Readings from Last 3 Encounters:   01/19/21 (!) 48      Resp Readings from Last 3 Encounters:   No data found for Resp    SpO2 Readings from Last 3 Encounters:   01/19/21 99%      Temp Readings from Last 1 Encounters:   No data found for Temp    Ht Readings from Last 1 Encounters:   01/19/21 1.778 m (5' 10\")      Wt Readings from Last 1 Encounters:   01/19/21 92.5 kg (204 lb)    Estimated body mass index is 29.27 kg/m  as calculated from the following:    Height as of 1/19/21: 1.778 m (5' 10\").    Weight as of 1/19/21: 92.5 kg (204 lb).       ROS/MED HX    The complete review of systems is negative other than noted in the HPI or here.   ENT/Pulmonary:     (+) tobacco use, Current use,     Neurologic:  - neg neurologic ROS     Cardiovascular:     (+) Dyslipidemia hypertension-Peripheral Vascular Disease-- Other. CAD -past MI -stent-2010. 2 Taking blood thinners Pt has received instructions: Instructions Given to patient: Last dose of ASA 1/21, last dose of Plavix 1/16/21. CHF etiology: ischemic Last EF: 10-15% date: 1/6/21 Previous cardiac testing   Echo: Date: 1/6/21 Results:    Stress Test: Date: Results:    ECG Reviewed: Date: Results:    Cath: Date: 1/7/21 Results:        METS/Exercise Tolerance:  3 - Able to walk 1-2 blocks without stopping   Hematologic:  - neg hematologic  ROS       Musculoskeletal:  - neg musculoskeletal ROS       GI/Hepatic:  - neg GI/hepatic ROS       Renal/Genitourinary:  - neg Renal ROS       Endo:     (+) type II DM, Last HgA1c: 11.2, date: 1/20/21, Using insulin, - not using insulin pump. Diabetic complications: cardiac problems.       Psychiatric:  - neg psychiatric ROS    (-) chronic opioid use history   Infectious Disease:  - neg " "infectious disease ROS       Malignancy:       Other:    (+) , no H/O Chronic Pain,           PHYSICAL EXAM:   Mental Status/Neuro: A/A/O; Age Appropriate   Airway: Facies: Feasible  Mallampati: I  Mouth/Opening: Full  TM distance: > 6 cm  Neck ROM: Full   Respiratory: Auscultation: CTAB     Resp. Rate: Normal     Resp. Effort: Normal      CV: Rhythm: Regular  Heart: Normal Sounds   Comments:                  Temp: 98.1  F (36.7  C) Temp src: Oral BP: 108/72 Pulse: 50   Resp: 12 SpO2: 98 %         203 lbs 0 oz  5' 10\"[PT REPORTED[   Body mass index is 29.13 kg/m .       Physical Exam  Constitutional: Awake, alert, cooperative, no apparent distress, and appears stated age.  Eyes: Pupils equal, round and reactive to light, extra ocular muscles intact, sclera clear, conjunctiva normal.  HENT: Normocephalic, oral pharynx with moist mucus membranes, good dentition. No goiter appreciated.   Respiratory: Clear to auscultation bilaterally, no crackles or wheezing. No cough or obvious dyspnea.  Cardiovascular: Regular rate and rhythm, normal S1 and S2, and no murmur noted.  Carotids +2, no bruits. No edema. Palpable pulses to radial  DP and PT arteries.   GI: Normal bowel sounds, soft, non-distended, non-tender, no masses palpated, no hepatosplenomegaly.    Lymph/Hematologic: No cervical lymphadenopathy and no supraclavicular lymphadenopathy.  Genitourinary: Deferred.   Skin: Warm and dry.  Right radial artery used for angiogram. No skin concerns.   Musculoskeletal: Full ROM of neck. There is no redness, warmth, or swelling of the joints. Gross motor strength is normal.    Neurologic: Awake, alert, oriented to name, place and time. Cranial nerves II-XII are grossly intact. Gait is normal.   Neuropsychiatric: Calm, cooperative. Normal affect.     EKG: Personally reviewed but formal cardiology read pendin21 (prelim) Sinus bradycardia, nonspecific intraventricular conduction block, T wave abnormality    Cardiac MRI " 1/8/21  Impression:  Large, inferior/inferoseptal transmural wall infarct and marked myocardial thinning extending from the base to the mid base.  Moderate to large anterior wall infarct, with areas involving greater than 50% of wall thickness.   Global hypokinesis with an akinetic inferior wall.   Reduced left ventricular ejection fraction of 20%.    Cardiac echo: 1/6/21  Summary  Technically difficult study  Mild valve appears to be normal. Mild mitral annular calcification.   Trace mitral regurgitation.   Trileaflet aortic valve. No stenois or regurgitation.  Tricupos valve appears to be normal. Trace tricuspid regurgitation.   RVSP can not be accurately estimated due to insufficient  tricuspid regurg  Mild left atrial enlargement  Left ventricular cavity size is severely enlarged. Normal left ventricular wall thickness. Left ventricular systolic function is severely reduced with an estimated ejection fraction EF of 10-15%.  LV filling pressures likely normal.  Mild right atrial enlargement.   Normal right ventricular size and low normal RV function.   IVC is normal in size 1.6 cm and collapses >50% with inspiration suggestive of normal right atrial pressure.   No pericardial effusion visualized.   Compared to previous echo done in 12/24/2019, LV systolic function significantly decreased.    Angiogram 1/7/21  Conclusion   Peripheral angiogram  Patent right and left common iliac external and internal iliac   CFA  mild disease SFA bilaterally   RCA two layers of overlapping stents with distal 89% and moderate proximal disease  LM distal 60%, LAD and D1 significant disease  LCx ISR 80%     Carotid US 1/20/21                                                                   IMPRESSION:     1. RIGHT ICA: 50-69% diameter stenosis by sonographic velocity  criteria.     2. LEFT ICA:  Greater than 70% diameter stenosis by grayscale imaging  and sonographic velocity criteria.     [Access Center: Left internal carotid  artery greater than 70% diameter  stenosis.]     CHEST X-RAY 1/20/21                                                                Impression: Mildly enlarged cardiac silhouette. No acute airspace  disease.    CT Chest 1/20/21                                                                IMPRESSION:   1. Mild cardiomegaly with left ventricular enlargement and evidence of  prior myocardial infarction/s. Severe coronary artery calcifications.  2. No acute airspace disease.      PFTs 1/19/21  Most Recent [facility] Pulmonary Function Testing    FVC-Pred   Date Value Ref Range Status   01/19/2021 5.02 L      FVC-Pre   Date Value Ref Range Status   01/19/2021 3.61 L      FVC-%Pred-Pre   Date Value Ref Range Status   01/19/2021 72 %      FEV1-Pre   Date Value Ref Range Status   01/19/2021 3.09 L      FEV1-%Pred-Pre   Date Value Ref Range Status   01/19/2021 78 %      FEV1FVC-Pred   Date Value Ref Range Status   01/19/2021 79 %      FEV1FVC-Pre   Date Value Ref Range Status   01/19/2021 86 %      FEFMax-Pred   Date Value Ref Range Status   01/19/2021 9.87 L/sec      FEFMax-Pre   Date Value Ref Range Status   01/19/2021 9.30 L/sec      FEFMax-%Pred-Pre   Date Value Ref Range Status   01/19/2021 94 %      ExpTime-Pre   Date Value Ref Range Status   01/19/2021 6.82 sec      FIFMax-Pre   Date Value Ref Range Status   01/19/2021 4.73 L/sec      FEV1FEV6-Pred   Date Value Ref Range Status   01/19/2021 81 %      FEV1FEV6-Pre   Date Value Ref Range Status   01/19/2021 85 %        Imaging, PFTs and cardiac testing reviewed by this provider    Outside records reviewed from: [facility]    ASSESSMENT and PLAN  Jaxon Melendez is a 50 year old male scheduled to undergo CORONARY ARTERY BYPASS GRAFT (CABG) with Dr. Gibbons on 1/22/21. He has the following specific operative considerations:   - RCRI : High risk surgery (>5% cardiac complication risk).    - Anesthesia considerations:  Refer to PAC assessment in anesthesia  records  - VTE risk: 0.5%  - MING # of risks 2/8 = Low risk  - Risk of PONV score = 1.  If > 2, anti-emetic intervention recommended.    --CAD s/p PCI to RCA and LCx in 2010, now with STEMI and angiogram with multivessel disease and in-stent restenosis. EF ~10%. Has been counseled for above procedure with plan for IABP day before surgery. ASA 81 mg daily and will remain on up to DOS. Last dose of Plavix 1/16/21. DYSPNEA ON EXERTION and mild chest pain when walking in cold today.   --HLD atorvastatin. HTN. lisinopril. metoprolol. BP today 108/72. All testing above. 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   --Current smoker now 1/2 PPD with history of 25 pack years. Denies pulmonary symptoms. Lungs CTA.   --GERD. Will take Protonix on DOS.  --DIABETES MELLITUS II. New diagnosis. Last A1C 11.2. Humalog insulin 10 units with meals, metformin and Lantus insulin 25 units at 6 pm. Is not monitoring blood sugar regularly. Denies use of Trulicity today.   --Regular alcohol use. 6-7 mixed drinks nightly. Denies history of withdrawal. LFTs normal. INR 1.22.  --Type and screen drawn by service.     Confirmed that patient understands instructions for arrival to hospital and has stopped his Plavix. Requested that he brings an updated list of medications to the hospital as there were some questions today.     Patient was discussed with Dr Hung.    LOUIS Warren CNS  Preoperative Assessment Center  [facility]  Phone: [phone]  Fax: [phone]

## 2021-01-21 ENCOUNTER — APPOINTMENT (OUTPATIENT)
Dept: MEDSURG UNIT | Facility: CLINIC | Age: 51
DRG: 235 | End: 2021-01-21
Attending: INTERNAL MEDICINE
Payer: COMMERCIAL

## 2021-01-21 ENCOUNTER — APPOINTMENT (OUTPATIENT)
Dept: GENERAL RADIOLOGY | Facility: CLINIC | Age: 51
DRG: 235 | End: 2021-01-21
Attending: NURSE PRACTITIONER
Payer: COMMERCIAL

## 2021-01-21 ENCOUNTER — HOSPITAL ENCOUNTER (INPATIENT)
Facility: CLINIC | Age: 51
LOS: 11 days | Discharge: NURSING, REHAB OR LONG-TERM CARE | DRG: 235 | End: 2021-02-01
Attending: SURGERY | Admitting: SURGERY
Payer: COMMERCIAL

## 2021-01-21 ENCOUNTER — HOSPITAL ENCOUNTER (INPATIENT)
Facility: CLINIC | Age: 51
LOS: 1 days | Discharge: STILL ADMITTED OR ADMINISTRATIVE | DRG: 235 | End: 2021-01-21
Attending: INTERNAL MEDICINE | Admitting: INTERNAL MEDICINE
Payer: COMMERCIAL

## 2021-01-21 VITALS
WEIGHT: 203 LBS | BODY MASS INDEX: 29.06 KG/M2 | RESPIRATION RATE: 16 BRPM | DIASTOLIC BLOOD PRESSURE: 51 MMHG | HEART RATE: 59 BPM | TEMPERATURE: 98.4 F | OXYGEN SATURATION: 96 % | HEIGHT: 70 IN | SYSTOLIC BLOOD PRESSURE: 135 MMHG

## 2021-01-21 DIAGNOSIS — I25.10 CAD, MULTIPLE VESSEL: Primary | ICD-10-CM

## 2021-01-21 DIAGNOSIS — Z95.1 S/P CABG (CORONARY ARTERY BYPASS GRAFT): ICD-10-CM

## 2021-01-21 DIAGNOSIS — I25.5 ISCHEMIC CARDIOMYOPATHY: ICD-10-CM

## 2021-01-21 DIAGNOSIS — I25.10 CAD (CORONARY ARTERY DISEASE): ICD-10-CM

## 2021-01-21 PROBLEM — E11.9 DIABETES MELLITUS, TYPE 2 (H): Status: ACTIVE | Noted: 2021-01-21

## 2021-01-21 LAB
ABO + RH BLD: NORMAL
ABO + RH BLD: NORMAL
ANION GAP SERPL CALCULATED.3IONS-SCNC: 2 MMOL/L (ref 3–14)
ANION GAP SERPL CALCULATED.3IONS-SCNC: 7 MMOL/L (ref 3–14)
BASE DEFICIT BLDV-SCNC: 0.1 MMOL/L
BASE DEFICIT BLDV-SCNC: 0.7 MMOL/L
BLD GP AB SCN SERPL QL: NORMAL
BLD PROD TYP BPU: NORMAL
BLOOD BANK CMNT PATIENT-IMP: NORMAL
BLOOD BANK CMNT PATIENT-IMP: NORMAL
BUN SERPL-MCNC: 9 MG/DL (ref 7–30)
BUN SERPL-MCNC: 9 MG/DL (ref 7–30)
CALCIUM SERPL-MCNC: 8.6 MG/DL (ref 8.5–10.1)
CALCIUM SERPL-MCNC: 8.9 MG/DL (ref 8.5–10.1)
CHLORIDE SERPL-SCNC: 108 MMOL/L (ref 94–109)
CHLORIDE SERPL-SCNC: 112 MMOL/L (ref 94–109)
CO2 SERPL-SCNC: 25 MMOL/L (ref 20–32)
CO2 SERPL-SCNC: 28 MMOL/L (ref 20–32)
CREAT SERPL-MCNC: 0.75 MG/DL (ref 0.66–1.25)
CREAT SERPL-MCNC: 0.8 MG/DL (ref 0.66–1.25)
ERYTHROCYTE [DISTWIDTH] IN BLOOD BY AUTOMATED COUNT: 12.4 % (ref 10–15)
GFR SERPL CREATININE-BSD FRML MDRD: >90 ML/MIN/{1.73_M2}
GFR SERPL CREATININE-BSD FRML MDRD: >90 ML/MIN/{1.73_M2}
GLUCOSE BLDC GLUCOMTR-MCNC: 114 MG/DL (ref 70–99)
GLUCOSE BLDC GLUCOMTR-MCNC: 127 MG/DL (ref 70–99)
GLUCOSE BLDC GLUCOMTR-MCNC: 132 MG/DL (ref 70–99)
GLUCOSE BLDC GLUCOMTR-MCNC: 86 MG/DL (ref 70–99)
GLUCOSE BLDC GLUCOMTR-MCNC: 92 MG/DL (ref 70–99)
GLUCOSE SERPL-MCNC: 152 MG/DL (ref 70–99)
GLUCOSE SERPL-MCNC: 90 MG/DL (ref 70–99)
HBA1C MFR BLD: 11.4 % (ref 0–5.6)
HCO3 BLDV-SCNC: 24 MMOL/L (ref 21–28)
HCO3 BLDV-SCNC: 24 MMOL/L (ref 21–28)
HCT VFR BLD AUTO: 46.4 % (ref 40–53)
HGB BLD-MCNC: 15 G/DL (ref 13.3–17.7)
INTERPRETATION ECG - MUSE: NORMAL
MAGNESIUM SERPL-MCNC: 1.4 MG/DL (ref 1.6–2.3)
MAGNESIUM SERPL-MCNC: 3.2 MG/DL (ref 1.6–2.3)
MCH RBC QN AUTO: 31.8 PG (ref 26.5–33)
MCHC RBC AUTO-ENTMCNC: 32.3 G/DL (ref 31.5–36.5)
MCV RBC AUTO: 99 FL (ref 78–100)
NUM BPU REQUESTED: 2
O2/TOTAL GAS SETTING VFR VENT: 21 %
O2/TOTAL GAS SETTING VFR VENT: 21 %
OXYHGB MFR BLDV: 69 %
OXYHGB MFR BLDV: 78 %
PCO2 BLDV: 35 MM HG (ref 40–50)
PCO2 BLDV: 41 MM HG (ref 40–50)
PH BLDV: 7.38 PH (ref 7.32–7.43)
PH BLDV: 7.44 PH (ref 7.32–7.43)
PLATELET # BLD AUTO: 207 10E9/L (ref 150–450)
PO2 BLDV: 37 MM HG (ref 25–47)
PO2 BLDV: 42 MM HG (ref 25–47)
POTASSIUM SERPL-SCNC: 3.2 MMOL/L (ref 3.4–5.3)
POTASSIUM SERPL-SCNC: 4 MMOL/L (ref 3.4–5.3)
POTASSIUM SERPL-SCNC: 4.1 MMOL/L (ref 3.4–5.3)
RBC # BLD AUTO: 4.71 10E12/L (ref 4.4–5.9)
SODIUM SERPL-SCNC: 140 MMOL/L (ref 133–144)
SODIUM SERPL-SCNC: 141 MMOL/L (ref 133–144)
SPECIMEN EXP DATE BLD: NORMAL
WBC # BLD AUTO: 9.9 10E9/L (ref 4–11)

## 2021-01-21 PROCEDURE — 99223 1ST HOSP IP/OBS HIGH 75: CPT | Mod: 25 | Performed by: INTERNAL MEDICINE

## 2021-01-21 PROCEDURE — 80048 BASIC METABOLIC PNL TOTAL CA: CPT | Performed by: STUDENT IN AN ORGANIZED HEALTH CARE EDUCATION/TRAINING PROGRAM

## 2021-01-21 PROCEDURE — 93005 ELECTROCARDIOGRAM TRACING: CPT

## 2021-01-21 PROCEDURE — 999N001017 HC STATISTIC GLUCOSE BY METER IP

## 2021-01-21 PROCEDURE — 4A133B3 MONITORING OF ARTERIAL PRESSURE, PULMONARY, PERCUTANEOUS APPROACH: ICD-10-PCS | Performed by: INTERNAL MEDICINE

## 2021-01-21 PROCEDURE — 93010 ELECTROCARDIOGRAM REPORT: CPT | Performed by: INTERNAL MEDICINE

## 2021-01-21 PROCEDURE — 999N000185 HC STATISTIC TRANSPORT TIME EA 15 MIN

## 2021-01-21 PROCEDURE — 83735 ASSAY OF MAGNESIUM: CPT | Performed by: STUDENT IN AN ORGANIZED HEALTH CARE EDUCATION/TRAINING PROGRAM

## 2021-01-21 PROCEDURE — 200N000002 HC R&B ICU UMMC

## 2021-01-21 PROCEDURE — 999N000065 XR CHEST PORT 1 VW

## 2021-01-21 PROCEDURE — 99153 MOD SED SAME PHYS/QHP EA: CPT | Performed by: INTERNAL MEDICINE

## 2021-01-21 PROCEDURE — 85027 COMPLETE CBC AUTOMATED: CPT | Performed by: STUDENT IN AN ORGANIZED HEALTH CARE EDUCATION/TRAINING PROGRAM

## 2021-01-21 PROCEDURE — 272N000001 HC OR GENERAL SUPPLY STERILE: Performed by: INTERNAL MEDICINE

## 2021-01-21 PROCEDURE — 33967 INSERT I-AORT PERCUT DEVICE: CPT | Performed by: INTERNAL MEDICINE

## 2021-01-21 PROCEDURE — 71045 X-RAY EXAM CHEST 1 VIEW: CPT | Mod: 26 | Performed by: RADIOLOGY

## 2021-01-21 PROCEDURE — 250N000009 HC RX 250: Performed by: SURGERY

## 2021-01-21 PROCEDURE — 258N000003 HC RX IP 258 OP 636: Performed by: NURSE PRACTITIONER

## 2021-01-21 PROCEDURE — 999N000157 HC STATISTIC RCP TIME EA 10 MIN

## 2021-01-21 PROCEDURE — 02HQ32Z INSERTION OF MONITORING DEVICE INTO RIGHT PULMONARY ARTERY, PERCUTANEOUS APPROACH: ICD-10-PCS | Performed by: INTERNAL MEDICINE

## 2021-01-21 PROCEDURE — 99152 MOD SED SAME PHYS/QHP 5/>YRS: CPT | Performed by: INTERNAL MEDICINE

## 2021-01-21 PROCEDURE — 83036 HEMOGLOBIN GLYCOSYLATED A1C: CPT | Performed by: NURSE PRACTITIONER

## 2021-01-21 PROCEDURE — 250N000013 HC RX MED GY IP 250 OP 250 PS 637: Performed by: NURSE PRACTITIONER

## 2021-01-21 PROCEDURE — 4A1239Z MONITORING OF CARDIAC OUTPUT, PERCUTANEOUS APPROACH: ICD-10-PCS | Performed by: INTERNAL MEDICINE

## 2021-01-21 PROCEDURE — 84132 ASSAY OF SERUM POTASSIUM: CPT | Performed by: NURSE PRACTITIONER

## 2021-01-21 PROCEDURE — 5A02210 ASSISTANCE WITH CARDIAC OUTPUT USING BALLOON PUMP, CONTINUOUS: ICD-10-PCS | Performed by: INTERNAL MEDICINE

## 2021-01-21 PROCEDURE — 82805 BLOOD GASES W/O2 SATURATION: CPT | Performed by: STUDENT IN AN ORGANIZED HEALTH CARE EDUCATION/TRAINING PROGRAM

## 2021-01-21 PROCEDURE — 80048 BASIC METABOLIC PNL TOTAL CA: CPT | Performed by: NURSE PRACTITIONER

## 2021-01-21 PROCEDURE — 250N000011 HC RX IP 250 OP 636: Performed by: INTERNAL MEDICINE

## 2021-01-21 PROCEDURE — 250N000013 HC RX MED GY IP 250 OP 250 PS 637: Performed by: STUDENT IN AN ORGANIZED HEALTH CARE EDUCATION/TRAINING PROGRAM

## 2021-01-21 PROCEDURE — 83735 ASSAY OF MAGNESIUM: CPT | Performed by: NURSE PRACTITIONER

## 2021-01-21 PROCEDURE — 258N000003 HC RX IP 258 OP 636: Performed by: SURGERY

## 2021-01-21 PROCEDURE — 93503 INSERT/PLACE HEART CATHETER: CPT | Performed by: INTERNAL MEDICINE

## 2021-01-21 PROCEDURE — 86850 RBC ANTIBODY SCREEN: CPT | Performed by: STUDENT IN AN ORGANIZED HEALTH CARE EDUCATION/TRAINING PROGRAM

## 2021-01-21 PROCEDURE — 250N000013 HC RX MED GY IP 250 OP 250 PS 637: Performed by: SURGERY

## 2021-01-21 PROCEDURE — 999N000142 HC STATISTIC PROCEDURE PREP ONLY

## 2021-01-21 PROCEDURE — 250N000011 HC RX IP 250 OP 636: Performed by: STUDENT IN AN ORGANIZED HEALTH CARE EDUCATION/TRAINING PROGRAM

## 2021-01-21 PROCEDURE — 82805 BLOOD GASES W/O2 SATURATION: CPT | Performed by: INTERNAL MEDICINE

## 2021-01-21 PROCEDURE — 250N000009 HC RX 250: Performed by: INTERNAL MEDICINE

## 2021-01-21 PROCEDURE — 250N000011 HC RX IP 250 OP 636: Performed by: NURSE PRACTITIONER

## 2021-01-21 PROCEDURE — C1894 INTRO/SHEATH, NON-LASER: HCPCS | Performed by: INTERNAL MEDICINE

## 2021-01-21 RX ORDER — NICOTINE POLACRILEX 4 MG
15-30 LOZENGE BUCCAL
Status: DISCONTINUED | OUTPATIENT
Start: 2021-01-21 | End: 2021-02-01 | Stop reason: HOSPADM

## 2021-01-21 RX ORDER — NITROGLYCERIN 0.4 MG/1
0.4 TABLET SUBLINGUAL EVERY 5 MIN PRN
Status: DISCONTINUED | OUTPATIENT
Start: 2021-01-21 | End: 2021-01-21 | Stop reason: HOSPADM

## 2021-01-21 RX ORDER — FAMOTIDINE 20 MG/1
20 TABLET, FILM COATED ORAL
Status: COMPLETED | OUTPATIENT
Start: 2021-01-22 | End: 2021-01-22

## 2021-01-21 RX ORDER — ATROPINE SULFATE 0.1 MG/ML
INJECTION INTRAVENOUS
Status: DISCONTINUED
Start: 2021-01-21 | End: 2021-01-22 | Stop reason: HOSPADM

## 2021-01-21 RX ORDER — CEFAZOLIN SODIUM 1 G/3ML
1 INJECTION, POWDER, FOR SOLUTION INTRAMUSCULAR; INTRAVENOUS SEE ADMIN INSTRUCTIONS
Status: DISCONTINUED | OUTPATIENT
Start: 2021-01-21 | End: 2021-01-21 | Stop reason: HOSPADM

## 2021-01-21 RX ORDER — MAGNESIUM SULFATE HEPTAHYDRATE 40 MG/ML
2 INJECTION, SOLUTION INTRAVENOUS ONCE
Status: COMPLETED | OUTPATIENT
Start: 2021-01-21 | End: 2021-01-21

## 2021-01-21 RX ORDER — CEFAZOLIN SODIUM 2 G/100ML
2 INJECTION, SOLUTION INTRAVENOUS
Status: DISCONTINUED | OUTPATIENT
Start: 2021-01-21 | End: 2021-01-21 | Stop reason: HOSPADM

## 2021-01-21 RX ORDER — PHENYLEPHRINE HCL IN 0.9% NACL 50MG/250ML
0.5-6 PLASTIC BAG, INJECTION (ML) INTRAVENOUS CONTINUOUS
Status: DISCONTINUED | OUTPATIENT
Start: 2021-01-21 | End: 2021-01-21 | Stop reason: HOSPADM

## 2021-01-21 RX ORDER — ACETAMINOPHEN 650 MG/1
650 SUPPOSITORY RECTAL EVERY 4 HOURS PRN
Status: DISCONTINUED | OUTPATIENT
Start: 2021-01-21 | End: 2021-01-28

## 2021-01-21 RX ORDER — AMOXICILLIN 250 MG
1 CAPSULE ORAL 2 TIMES DAILY
Status: DISCONTINUED | OUTPATIENT
Start: 2021-01-21 | End: 2021-01-21 | Stop reason: HOSPADM

## 2021-01-21 RX ORDER — HEPARIN SODIUM 5000 [USP'U]/.5ML
5000 INJECTION, SOLUTION INTRAVENOUS; SUBCUTANEOUS EVERY 12 HOURS
Status: DISCONTINUED | OUTPATIENT
Start: 2021-01-21 | End: 2021-01-21

## 2021-01-21 RX ORDER — LIDOCAINE 40 MG/G
CREAM TOPICAL
Status: DISCONTINUED | OUTPATIENT
Start: 2021-01-21 | End: 2021-01-21 | Stop reason: HOSPADM

## 2021-01-21 RX ORDER — DEXMEDETOMIDINE HYDROCHLORIDE 4 UG/ML
0.2-1.2 INJECTION, SOLUTION INTRAVENOUS CONTINUOUS
Status: DISCONTINUED | OUTPATIENT
Start: 2021-01-22 | End: 2021-01-22 | Stop reason: HOSPADM

## 2021-01-21 RX ORDER — DEXTROSE MONOHYDRATE 25 G/50ML
25-50 INJECTION, SOLUTION INTRAVENOUS
Status: DISCONTINUED | OUTPATIENT
Start: 2021-01-21 | End: 2021-02-01 | Stop reason: HOSPADM

## 2021-01-21 RX ORDER — NOREPINEPHRINE BITARTRATE 0.06 MG/ML
0.03-0.4 INJECTION, SOLUTION INTRAVENOUS CONTINUOUS
Status: DISCONTINUED | OUTPATIENT
Start: 2021-01-22 | End: 2021-01-22 | Stop reason: HOSPADM

## 2021-01-21 RX ORDER — HEPARIN SODIUM 5000 [USP'U]/.5ML
5000 INJECTION, SOLUTION INTRAVENOUS; SUBCUTANEOUS EVERY 8 HOURS
Status: COMPLETED | OUTPATIENT
Start: 2021-01-21 | End: 2021-01-22

## 2021-01-21 RX ORDER — ATORVASTATIN CALCIUM 40 MG/1
40 TABLET, FILM COATED ORAL EVERY MORNING
Status: DISCONTINUED | OUTPATIENT
Start: 2021-01-22 | End: 2021-02-01 | Stop reason: HOSPADM

## 2021-01-21 RX ORDER — MAGNESIUM HYDROXIDE/ALUMINUM HYDROXICE/SIMETHICONE 120; 1200; 1200 MG/30ML; MG/30ML; MG/30ML
30 SUSPENSION ORAL EVERY 4 HOURS PRN
Status: DISCONTINUED | OUTPATIENT
Start: 2021-01-21 | End: 2021-02-01 | Stop reason: HOSPADM

## 2021-01-21 RX ORDER — POTASSIUM CHLORIDE 750 MG/1
20 TABLET, EXTENDED RELEASE ORAL
Status: DISCONTINUED | OUTPATIENT
Start: 2021-01-21 | End: 2021-01-21 | Stop reason: HOSPADM

## 2021-01-21 RX ORDER — GABAPENTIN 300 MG/1
300 CAPSULE ORAL
Status: DISCONTINUED | OUTPATIENT
Start: 2021-01-21 | End: 2021-01-21 | Stop reason: HOSPADM

## 2021-01-21 RX ORDER — ACETAMINOPHEN 650 MG/1
650 SUPPOSITORY RECTAL EVERY 4 HOURS PRN
Status: DISCONTINUED | OUTPATIENT
Start: 2021-01-21 | End: 2021-01-21 | Stop reason: HOSPADM

## 2021-01-21 RX ORDER — MAGNESIUM HYDROXIDE/ALUMINUM HYDROXICE/SIMETHICONE 120; 1200; 1200 MG/30ML; MG/30ML; MG/30ML
30 SUSPENSION ORAL EVERY 4 HOURS PRN
Status: DISCONTINUED | OUTPATIENT
Start: 2021-01-21 | End: 2021-01-21 | Stop reason: HOSPADM

## 2021-01-21 RX ORDER — ONDANSETRON 2 MG/ML
4 INJECTION INTRAMUSCULAR; INTRAVENOUS EVERY 6 HOURS PRN
Status: DISCONTINUED | OUTPATIENT
Start: 2021-01-21 | End: 2021-01-21 | Stop reason: HOSPADM

## 2021-01-21 RX ORDER — AMOXICILLIN 250 MG
2 CAPSULE ORAL 2 TIMES DAILY PRN
Status: DISCONTINUED | OUTPATIENT
Start: 2021-01-21 | End: 2021-02-01 | Stop reason: HOSPADM

## 2021-01-21 RX ORDER — SODIUM CHLORIDE 9 MG/ML
INJECTION, SOLUTION INTRAVENOUS CONTINUOUS
Status: DISCONTINUED | OUTPATIENT
Start: 2021-01-21 | End: 2021-01-21 | Stop reason: HOSPADM

## 2021-01-21 RX ORDER — HYDRALAZINE HYDROCHLORIDE 20 MG/ML
10 INJECTION INTRAMUSCULAR; INTRAVENOUS EVERY 6 HOURS PRN
Status: DISCONTINUED | OUTPATIENT
Start: 2021-01-21 | End: 2021-01-21

## 2021-01-21 RX ORDER — POTASSIUM CHLORIDE 1.5 G/1.58G
40 POWDER, FOR SOLUTION ORAL ONCE
Status: DISCONTINUED | OUTPATIENT
Start: 2021-01-21 | End: 2021-01-21

## 2021-01-21 RX ORDER — ACETAMINOPHEN 325 MG/1
975 TABLET ORAL ONCE
Status: COMPLETED | OUTPATIENT
Start: 2021-01-22 | End: 2021-01-22

## 2021-01-21 RX ORDER — FENTANYL CITRATE 50 UG/ML
INJECTION, SOLUTION INTRAMUSCULAR; INTRAVENOUS
Status: DISCONTINUED | OUTPATIENT
Start: 2021-01-21 | End: 2021-01-21 | Stop reason: HOSPADM

## 2021-01-21 RX ORDER — ASPIRIN 81 MG/1
162 TABLET, CHEWABLE ORAL
Status: COMPLETED | OUTPATIENT
Start: 2021-01-22 | End: 2021-01-22

## 2021-01-21 RX ORDER — NOREPINEPHRINE BITARTRATE 0.06 MG/ML
0.03-0.4 INJECTION, SOLUTION INTRAVENOUS CONTINUOUS
Status: DISCONTINUED | OUTPATIENT
Start: 2021-01-21 | End: 2021-01-21 | Stop reason: HOSPADM

## 2021-01-21 RX ORDER — INSULIN LISPRO 100 [IU]/ML
10 INJECTION, SOLUTION INTRAVENOUS; SUBCUTANEOUS
Status: ON HOLD | COMMUNITY
End: 2021-02-01

## 2021-01-21 RX ORDER — ATORVASTATIN CALCIUM 40 MG/1
40 TABLET, FILM COATED ORAL EVERY MORNING
Status: DISCONTINUED | OUTPATIENT
Start: 2021-01-22 | End: 2021-01-21

## 2021-01-21 RX ORDER — HYDRALAZINE HYDROCHLORIDE 20 MG/ML
20 INJECTION INTRAMUSCULAR; INTRAVENOUS EVERY 6 HOURS PRN
Status: DISCONTINUED | OUTPATIENT
Start: 2021-01-21 | End: 2021-01-22

## 2021-01-21 RX ORDER — ONDANSETRON 4 MG/1
4 TABLET, ORALLY DISINTEGRATING ORAL EVERY 6 HOURS PRN
Status: DISCONTINUED | OUTPATIENT
Start: 2021-01-21 | End: 2021-01-22

## 2021-01-21 RX ORDER — LIDOCAINE 40 MG/G
CREAM TOPICAL
Status: DISCONTINUED | OUTPATIENT
Start: 2021-01-21 | End: 2021-01-22 | Stop reason: HOSPADM

## 2021-01-21 RX ORDER — METOPROLOL SUCCINATE 50 MG/1
50 TABLET, EXTENDED RELEASE ORAL EVERY MORNING
Status: DISCONTINUED | OUTPATIENT
Start: 2021-01-22 | End: 2021-01-21

## 2021-01-21 RX ORDER — GABAPENTIN 300 MG/1
300 CAPSULE ORAL
Status: COMPLETED | OUTPATIENT
Start: 2021-01-22 | End: 2021-01-22

## 2021-01-21 RX ORDER — ASPIRIN 81 MG/1
81 TABLET ORAL EVERY MORNING
Status: DISCONTINUED | OUTPATIENT
Start: 2021-01-22 | End: 2021-01-22

## 2021-01-21 RX ORDER — NITROGLYCERIN 0.4 MG/1
0.4 TABLET SUBLINGUAL EVERY 5 MIN PRN
Status: DISCONTINUED | OUTPATIENT
Start: 2021-01-21 | End: 2021-01-21

## 2021-01-21 RX ORDER — CHLORHEXIDINE GLUCONATE ORAL RINSE 1.2 MG/ML
10 SOLUTION DENTAL ONCE
Status: COMPLETED | OUTPATIENT
Start: 2021-01-22 | End: 2021-01-22

## 2021-01-21 RX ORDER — ASPIRIN 81 MG/1
162 TABLET, CHEWABLE ORAL
Status: DISCONTINUED | OUTPATIENT
Start: 2021-01-21 | End: 2021-01-21 | Stop reason: HOSPADM

## 2021-01-21 RX ORDER — ASPIRIN 81 MG/1
81 TABLET, CHEWABLE ORAL
Status: DISCONTINUED | OUTPATIENT
Start: 2021-01-21 | End: 2021-01-21 | Stop reason: HOSPADM

## 2021-01-21 RX ORDER — DEXMEDETOMIDINE HYDROCHLORIDE 4 UG/ML
0.2-1.2 INJECTION, SOLUTION INTRAVENOUS CONTINUOUS
Status: DISCONTINUED | OUTPATIENT
Start: 2021-01-21 | End: 2021-01-21 | Stop reason: HOSPADM

## 2021-01-21 RX ORDER — FAMOTIDINE 20 MG/1
20 TABLET, FILM COATED ORAL
Status: DISCONTINUED | OUTPATIENT
Start: 2021-01-21 | End: 2021-01-21 | Stop reason: HOSPADM

## 2021-01-21 RX ORDER — CEFAZOLIN SODIUM 2 G/100ML
2 INJECTION, SOLUTION INTRAVENOUS
Status: COMPLETED | OUTPATIENT
Start: 2021-01-22 | End: 2021-01-22

## 2021-01-21 RX ORDER — ONDANSETRON 4 MG/1
4 TABLET, ORALLY DISINTEGRATING ORAL EVERY 6 HOURS PRN
Status: DISCONTINUED | OUTPATIENT
Start: 2021-01-21 | End: 2021-01-21 | Stop reason: HOSPADM

## 2021-01-21 RX ORDER — ACETAMINOPHEN 325 MG/1
650 TABLET ORAL EVERY 4 HOURS PRN
Status: DISCONTINUED | OUTPATIENT
Start: 2021-01-21 | End: 2021-01-22

## 2021-01-21 RX ORDER — LIDOCAINE 40 MG/G
CREAM TOPICAL
Status: DISCONTINUED | OUTPATIENT
Start: 2021-01-21 | End: 2021-02-01 | Stop reason: HOSPADM

## 2021-01-21 RX ORDER — ACETAMINOPHEN 325 MG/1
650 TABLET ORAL EVERY 4 HOURS PRN
Status: DISCONTINUED | OUTPATIENT
Start: 2021-01-21 | End: 2021-01-21 | Stop reason: HOSPADM

## 2021-01-21 RX ORDER — CEFAZOLIN SODIUM 1 G/3ML
1 INJECTION, POWDER, FOR SOLUTION INTRAMUSCULAR; INTRAVENOUS SEE ADMIN INSTRUCTIONS
Status: DISCONTINUED | OUTPATIENT
Start: 2021-01-22 | End: 2021-01-22 | Stop reason: HOSPADM

## 2021-01-21 RX ORDER — ASPIRIN 81 MG/1
81 TABLET, CHEWABLE ORAL
Status: COMPLETED | OUTPATIENT
Start: 2021-01-22 | End: 2021-01-22

## 2021-01-21 RX ORDER — NITROGLYCERIN 0.4 MG/1
0.4 TABLET SUBLINGUAL EVERY 5 MIN PRN
Status: DISCONTINUED | OUTPATIENT
Start: 2021-01-21 | End: 2021-02-01 | Stop reason: HOSPADM

## 2021-01-21 RX ORDER — CHLORHEXIDINE GLUCONATE ORAL RINSE 1.2 MG/ML
10 SOLUTION DENTAL ONCE
Status: DISCONTINUED | OUTPATIENT
Start: 2021-01-21 | End: 2021-01-21 | Stop reason: HOSPADM

## 2021-01-21 RX ORDER — ASPIRIN 81 MG/1
81 TABLET ORAL DAILY
Status: DISCONTINUED | OUTPATIENT
Start: 2021-01-22 | End: 2021-01-21 | Stop reason: HOSPADM

## 2021-01-21 RX ORDER — POTASSIUM CHLORIDE 750 MG/1
40 TABLET, EXTENDED RELEASE ORAL ONCE
Status: COMPLETED | OUTPATIENT
Start: 2021-01-21 | End: 2021-01-21

## 2021-01-21 RX ORDER — POTASSIUM CHLORIDE 750 MG/1
40 TABLET, EXTENDED RELEASE ORAL
Status: COMPLETED | OUTPATIENT
Start: 2021-01-21 | End: 2021-01-21

## 2021-01-21 RX ORDER — PHENYLEPHRINE HCL IN 0.9% NACL 50MG/250ML
0.5-6 PLASTIC BAG, INJECTION (ML) INTRAVENOUS CONTINUOUS
Status: DISCONTINUED | OUTPATIENT
Start: 2021-01-22 | End: 2021-01-22 | Stop reason: HOSPADM

## 2021-01-21 RX ORDER — ACETAMINOPHEN 325 MG/1
975 TABLET ORAL ONCE
Status: DISCONTINUED | OUTPATIENT
Start: 2021-01-21 | End: 2021-01-21 | Stop reason: HOSPADM

## 2021-01-21 RX ORDER — SODIUM CHLORIDE 9 MG/ML
INJECTION, SOLUTION INTRAVENOUS CONTINUOUS
Status: DISCONTINUED | OUTPATIENT
Start: 2021-01-21 | End: 2021-01-22 | Stop reason: CLARIF

## 2021-01-21 RX ORDER — HYDRALAZINE HYDROCHLORIDE 20 MG/ML
10 INJECTION INTRAMUSCULAR; INTRAVENOUS ONCE
Status: DISCONTINUED | OUTPATIENT
Start: 2021-01-21 | End: 2021-01-21

## 2021-01-21 RX ORDER — METOPROLOL SUCCINATE 50 MG/1
50 TABLET, EXTENDED RELEASE ORAL EVERY MORNING
Status: DISCONTINUED | OUTPATIENT
Start: 2021-01-22 | End: 2021-01-23

## 2021-01-21 RX ORDER — ONDANSETRON 2 MG/ML
4 INJECTION INTRAMUSCULAR; INTRAVENOUS EVERY 6 HOURS PRN
Status: DISCONTINUED | OUTPATIENT
Start: 2021-01-21 | End: 2021-01-22

## 2021-01-21 RX ORDER — AMOXICILLIN 250 MG
1 CAPSULE ORAL 2 TIMES DAILY PRN
Status: DISCONTINUED | OUTPATIENT
Start: 2021-01-21 | End: 2021-02-01 | Stop reason: HOSPADM

## 2021-01-21 RX ORDER — INSULIN LISPRO 100 [IU]/ML
10 INJECTION, SOLUTION INTRAVENOUS; SUBCUTANEOUS
Status: DISCONTINUED | OUTPATIENT
Start: 2021-01-21 | End: 2021-01-21

## 2021-01-21 RX ORDER — AMOXICILLIN 250 MG
2 CAPSULE ORAL 2 TIMES DAILY
Status: DISCONTINUED | OUTPATIENT
Start: 2021-01-21 | End: 2021-01-21 | Stop reason: HOSPADM

## 2021-01-21 RX ADMIN — MAGNESIUM SULFATE HEPTAHYDRATE 2 G: 40 INJECTION, SOLUTION INTRAVENOUS at 15:57

## 2021-01-21 RX ADMIN — POTASSIUM CHLORIDE 40 MEQ: 750 TABLET, EXTENDED RELEASE ORAL at 16:47

## 2021-01-21 RX ADMIN — MAGNESIUM SULFATE HEPTAHYDRATE 2 G: 40 INJECTION, SOLUTION INTRAVENOUS at 17:16

## 2021-01-21 RX ADMIN — POTASSIUM CHLORIDE 40 MEQ: 750 TABLET, EXTENDED RELEASE ORAL at 09:17

## 2021-01-21 RX ADMIN — HYDRALAZINE HYDROCHLORIDE 10 MG: 20 INJECTION, SOLUTION INTRAMUSCULAR; INTRAVENOUS at 17:39

## 2021-01-21 RX ADMIN — HEPARIN SODIUM 5000 UNITS: 5000 INJECTION, SOLUTION INTRAVENOUS; SUBCUTANEOUS at 15:56

## 2021-01-21 RX ADMIN — ASPIRIN 325 MG ORAL TABLET 325 MG: 325 PILL ORAL at 09:17

## 2021-01-21 RX ADMIN — ACETAMINOPHEN 650 MG: 325 TABLET, FILM COATED ORAL at 16:49

## 2021-01-21 RX ADMIN — SODIUM CHLORIDE: 9 INJECTION, SOLUTION INTRAVENOUS at 10:00

## 2021-01-21 RX ADMIN — SODIUM CHLORIDE: 9 INJECTION, SOLUTION INTRAVENOUS at 15:36

## 2021-01-21 ASSESSMENT — ACTIVITIES OF DAILY LIVING (ADL)
ADLS_ACUITY_SCORE: 16
ADLS_ACUITY_SCORE: 14

## 2021-01-21 ASSESSMENT — MIFFLIN-ST. JEOR: SCORE: 1792.05

## 2021-01-21 NOTE — H&P
CARDIOLOGY-[facility] HISTORY AND PHYSICAL NOTE  Jaxon Melendez MRN:[id number] YOB: 1970  Date of Admission:1/21/2021    ASSESSMENT AND PLAN:   Patient with history of HLD, HTN, DM II, smoking, and CAD s/p PCI to RCA and LCx in 2010, found to have triple vessel disease. He has a cardiomyopathy with an EF=10%. Had IABP placed. Will go for surgery CAB tomorrow.     CAD  3ple vessel disease  C/w aspirin 81 mg PO daily  C/w atorvastatin 40 mg PO daily  IABP 1:1, no heparin gtt     DM  C/w lantus 10 U tonight (home dose is 25 U)  Holding other diabetes meds    HTN  Holding lisinopril pre surgery   C/w metoprolol succinate ER (TOPROL-XL) 25 MG 24 hr tablet  Hydralazine 10 mg IV PRN     Sinus bradycardia   Hemodynamically stable  No symptoms  Will continue to monitor     HISTORY OF PRESENT ILLNESS:  Patient with history of HLD, HTN, DM II, and CAD s/p PCI to RCA and LCx in 2010. He continued to smoke. He was more recently seen with retrosternal chest pain excerbated by exertion, relieved with rest. Troponins were elevated 25-69. He was treated for acute coronary syndrome, and taken to the cath lab where he was found to have severe multi vessel disease including in-stent restenosis. His EF was approximately 10%. The plan includes admission today for placement of IABP and then surgery on the day after.     His history is also significant for hidradenitis suppurativa in the presacral area and regular alcohol use 6-7 drinks daily.     PAST MEDICAL HISTORY:  Reviewed with patient on 01/21/2021   Past Medical History:   Diagnosis Date     Acute systolic heart failure (H)     EF 10%     CAD (coronary artery disease)      Diabetes mellitus, type 2 (H)     uncontrolled     Essential hypertension      Hidradenitis suppurativa     presacral area     Hyperlipidemia LDL goal <100      NSTEMI (non-ST elevated myocardial infarction) (H)        Past Surgical History:   Procedure Laterality Date     ANGIOGRAM      2010, 2021         MEDICATIONS:  PTA Meds  Prior to Admission medications    Medication Sig Last Dose Taking? Auth Provider   Acetaminophen (TYLENOL) 325 MG CAPS Take 325-650 mg by mouth every 4 hours as needed Past Month at Unknown time Yes Reported, Patient   aspirin 81 MG EC tablet Take 81 mg by mouth every morning  1/20/2021 at 0800 Yes Reported, Patient   atorvastatin (LIPITOR) 40 MG tablet Take 40 mg by mouth every morning  1/20/2021 at 0800 Yes Reported, Patient   insulin glargine (LANTUS PEN) 100 UNIT/ML pen Inject 25 Units Subcutaneous every evening  1/20/2021 at 1800 Yes Reported, Patient   insulin lispro (HUMALOG KWIKPEN) 100 UNIT/ML (1 unit dial) KWIKPEN Inject 10 Units Subcutaneous 3 times daily (before meals) 1/20/2021 at 1800 Yes Reported, Patient   lisinopril (ZESTRIL) 5 MG tablet Take 10 mg by mouth every morning  1/20/2021 at 0800 Yes Reported, Patient   metFORMIN (GLUCOPHAGE) 500 MG tablet Take 500 mg by mouth daily (with breakfast) 1/20/2021 at 0800 Yes Reported, Patient   metoprolol succinate ER (TOPROL-XL) 25 MG 24 hr tablet Take 50 mg by mouth every morning  1/20/2021 at 0800 Yes Reported, Patient   clopidogrel (PLAVIX) 75 MG tablet Take 75 mg by mouth every morning Pt states he is not taking this medication.  CTRN  1/21/21 Unknown at Unknown time  Reported, Patient   dulaglutide (TRULICITY) 0.75 MG/0.5ML pen Inject 0.75 mg Subcutaneous every 7 days Pt is unaware of this med.  He is not taking. Unknown at Unknown time  Reported, Patient   nitroGLYcerin (NITROSTAT) 0.4 MG sublingual tablet Place 0.4 mg under the tongue every 5 minutes as needed for chest pain For chest pain place 1 tablet under the tongue every 5 minutes for 3 doses. If symptoms persist 5 minutes after 1st dose call 911. Unknown at Unknown time  Reported, Patient      Current Meds    [START ON 1/22/2021] aspirin  81 mg Oral QAM     [START ON 1/22/2021] atorvastatin  40 mg Oral QAM     atropine         heparin ANTICOAGULANT  5,000 Units  Subcutaneous Q12H     insulin aspart  1-7 Units Subcutaneous TID AC     insulin aspart  1-5 Units Subcutaneous At Bedtime     insulin glargine  10 Units Subcutaneous At Bedtime     [START ON 1/22/2021] metoprolol succinate ER  50 mg Oral QAM     sodium chloride (PF)  3 mL Intracatheter Q8H     Infusion Meds    - MEDICATION INSTRUCTIONS -       Reason anticoagulation order not selected         ALLERGIES:    No Known Allergies    REVIEW OF SYSTEMS:  A 10 point review of systems was negative except as noted above.    SOCIAL HISTORY:   Social History     Socioeconomic History     Marital status:      Spouse name: Not on file     Number of children: Not on file     Years of education: Not on file     Highest education level: Not on file   Occupational History     Not on file   Social Needs     Financial resource strain: Not on file     Food insecurity     Worry: Not on file     Inability: Not on file     Transportation needs     Medical: Not on file     Non-medical: Not on file   Tobacco Use     Smoking status: Current Every Day Smoker     Packs/day: 1.00     Years: 25.00     Pack years: 25.00     Types: Cigarettes     Smokeless tobacco: Never Used     Tobacco comment: Now down to 1/2 PPD smoking   Substance and Sexual Activity     Alcohol use: Yes     Comment: 6-7 mixed drinks nightly     Drug use: Never     Sexual activity: Not on file   Lifestyle     Physical activity     Days per week: Not on file     Minutes per session: Not on file     Stress: Not on file   Relationships     Social connections     Talks on phone: Not on file     Gets together: Not on file     Attends [employer] service: Not on file     Active member of club or organization: Not on file     Attends meetings of clubs or organizations: Not on file     Relationship status: Not on file     Intimate partner violence     Fear of current or ex partner: Not on file     Emotionally abused: Not on file     Physically abused: Not on file     Forced  sexual activity: Not on file   Other Topics Concern     Not on file   Social History Narrative     Not on file         FAMILY MEDICAL HISTORY:   Family History   Problem Relation Age of Onset     Brain Tumor Mother      Heart Disease Other      Diabetes Other          PHYSICAL EXAM:   Temp  Av.3  F (36.8  C)  Min: 98.1  F (36.7  C)  Max: 98.4  F (36.9  C)      Pulse  Av.5  Min: 50  Max: 59 Resp  Av  Min: 12  Max: 16  SpO2  Av %  Min: 96 %  Max: 98 %       /89 (BP Location: Right arm)   Pulse (!) 47   Resp 16   SpO2 97%       GENERAL APPEARANCE: Alert and NAD  Head: NC/AT  NECK: Supple, no goiter  Pulmonary: Lungs clear to auscultation with equal breath sounds bilaterally, no clubbing or cyanosis  CV: Sinus bradycardia , normal rate, no rub.   GI: Soft, nontender, normal bowel sounds, no HSM   SKIN: No rash, warm, dry  NEURO: Mentation intact and speech normal.     LABS:   CMP  Recent Labs   Lab 21  1416      POTASSIUM 3.4   CHLORIDE 106   CO2 30   ANIONGAP 4   *   BUN 8   CR 0.65*   GFRESTIMATED >90   GFRESTBLACK >90   RICHARD 8.9   PROTTOTAL 7.2   ALBUMIN 3.2*   BILITOTAL 0.7   ALKPHOS 100   AST 8   ALT 14     CBC  Recent Labs   Lab 21  1416   HGB 16.4   WBC 10.9   RBC 5.30   HCT 51.1   MCV 96   MCH 30.9   MCHC 32.1   RDW 12.0        INR  Recent Labs   Lab 21  1416   INR 1.22*   PTT 30     ABGNo lab results found in last 7 days.     IMAGING:    CT chest without contrast    1. Mild cardiomegaly with left ventricular enlargement and evidence of  prior myocardial infarction/s. Severe coronary artery calcifications.  2. No acute airspace disease.    EKG  Sinus bradycardia, normal axis, poor R wave progression          I have seen and examined the patient and agree with the finding and plan.       Vinay Wilkinson MD  Cardiology-[facility]  [phone]

## 2021-01-21 NOTE — PRE-PROCEDURE
GENERAL PRE-PROCEDURE:   Procedure:  Intra aortic balloon pump placement, possible PA catheter    Written consent obtained?: Yes    Risks and benefits: Risks, benefits and alternatives were discussed    Consent given by:  Patient  Patient states understanding of procedure being performed: Yes    Patient's understanding of procedure matches consent: Yes    Procedure consent matches procedure scheduled: Yes    Expected level of sedation:  Moderate  Appropriately NPO:  Yes  ASA Class:  Class 2- mild systemic disease, no acute problems, no functional limitations  History & Physical reviewed:  History and physical reviewed and no updates needed  Statement of review:  I have reviewed the lab findings, diagnostic data, medications, and the plan for sedation

## 2021-01-21 NOTE — PROGRESS NOTES
1000 Prep is complete for procedure.  Silvio is here for IABP Placement in prep for surgery tomorrow, 1/22/21.  Denies any pain or SOB.  Daughter, Susy, is here with him.  K+ 40 mEq given as replacement for a K+ of 3.4 on  1/19/21.  He also has had a  mg today here on 2A.  CTRN

## 2021-01-22 ENCOUNTER — APPOINTMENT (OUTPATIENT)
Dept: GENERAL RADIOLOGY | Facility: CLINIC | Age: 51
DRG: 235 | End: 2021-01-22
Attending: SURGERY
Payer: COMMERCIAL

## 2021-01-22 ENCOUNTER — APPOINTMENT (OUTPATIENT)
Dept: GENERAL RADIOLOGY | Facility: CLINIC | Age: 51
DRG: 235 | End: 2021-01-22
Attending: STUDENT IN AN ORGANIZED HEALTH CARE EDUCATION/TRAINING PROGRAM
Payer: COMMERCIAL

## 2021-01-22 ENCOUNTER — ANESTHESIA (OUTPATIENT)
Dept: SURGERY | Facility: CLINIC | Age: 51
DRG: 235 | End: 2021-01-22
Payer: COMMERCIAL

## 2021-01-22 LAB
ALBUMIN SERPL-MCNC: 3 G/DL (ref 3.4–5)
ALP SERPL-CCNC: 87 U/L (ref 40–150)
ALT SERPL W P-5'-P-CCNC: 12 U/L (ref 0–70)
ANGLE RATE OF CLOT GROWTH: 70.6 DEG (ref 59–74)
ANGLE RATE OF CLOT GROWTH: 71.4 DEG (ref 59–74)
ANGLE RATE OF CLOT STRENGTH: 70.7 DEGREES (ref 53–72)
ANION GAP SERPL CALCULATED.3IONS-SCNC: 12 MMOL/L (ref 3–14)
ANION GAP SERPL CALCULATED.3IONS-SCNC: 5 MMOL/L (ref 3–14)
ANION GAP SERPL CALCULATED.3IONS-SCNC: 9 MMOL/L (ref 3–14)
APTT PPP: 31 SEC (ref 22–37)
APTT PPP: 36 SEC (ref 22–37)
AST SERPL W P-5'-P-CCNC: 19 U/L (ref 0–45)
BASE DEFICIT BLDA-SCNC: 0.3 MMOL/L
BASE DEFICIT BLDA-SCNC: 1.7 MMOL/L
BASE DEFICIT BLDA-SCNC: 1.8 MMOL/L
BASE DEFICIT BLDA-SCNC: 2 MMOL/L
BASE DEFICIT BLDA-SCNC: 2.7 MMOL/L
BASE DEFICIT BLDA-SCNC: 4 MMOL/L
BASE DEFICIT BLDA-SCNC: 4.3 MMOL/L
BASE DEFICIT BLDA-SCNC: 6.1 MMOL/L
BASE DEFICIT BLDV-SCNC: 1 MMOL/L
BASE DEFICIT BLDV-SCNC: 3.1 MMOL/L
BASE DEFICIT BLDV-SCNC: 4.3 MMOL/L
BASE DEFICIT BLDV-SCNC: 4.4 MMOL/L
BASE EXCESS BLDA CALC-SCNC: 0.8 MMOL/L
BASE EXCESS BLDA CALC-SCNC: 1.2 MMOL/L
BASE EXCESS BLDV CALC-SCNC: 2.1 MMOL/L
BILIRUB SERPL-MCNC: 0.8 MG/DL (ref 0.2–1.3)
BUN SERPL-MCNC: 10 MG/DL (ref 7–30)
BUN SERPL-MCNC: 8 MG/DL (ref 7–30)
BUN SERPL-MCNC: 8 MG/DL (ref 7–30)
CA-I BLD-MCNC: 3.9 MG/DL (ref 4.4–5.2)
CA-I BLD-MCNC: 4 MG/DL (ref 4.4–5.2)
CA-I BLD-MCNC: 4.1 MG/DL (ref 4.4–5.2)
CA-I BLD-MCNC: 4.1 MG/DL (ref 4.4–5.2)
CA-I BLD-MCNC: 4.3 MG/DL (ref 4.4–5.2)
CA-I BLD-MCNC: 4.3 MG/DL (ref 4.4–5.2)
CA-I BLD-MCNC: 4.4 MG/DL (ref 4.4–5.2)
CA-I BLD-MCNC: 4.7 MG/DL (ref 4.4–5.2)
CA-I BLD-MCNC: 4.7 MG/DL (ref 4.4–5.2)
CA-I BLD-MCNC: 4.8 MG/DL (ref 4.4–5.2)
CA-I BLD-MCNC: 5.2 MG/DL (ref 4.4–5.2)
CALCIUM SERPL-MCNC: 8.4 MG/DL (ref 8.5–10.1)
CALCIUM SERPL-MCNC: 8.7 MG/DL (ref 8.5–10.1)
CALCIUM SERPL-MCNC: 9 MG/DL (ref 8.5–10.1)
CHLORIDE BLD-SCNC: 100 MMOL/L (ref 94–109)
CHLORIDE BLD-SCNC: 103 MMOL/L (ref 94–109)
CHLORIDE BLD-SCNC: 104 MMOL/L (ref 94–109)
CHLORIDE BLD-SCNC: 108 MMOL/L (ref 94–109)
CHLORIDE BLD-SCNC: 110 MMOL/L (ref 94–109)
CHLORIDE SERPL-SCNC: 108 MMOL/L (ref 94–109)
CHLORIDE SERPL-SCNC: 109 MMOL/L (ref 94–109)
CHLORIDE SERPL-SCNC: 110 MMOL/L (ref 94–109)
CI HYPERCOAGULATION INDEX: 1.7 RATIO (ref 0–3)
CI HYPERCOAGULATION INDEX: 2 RATIO (ref 0–3)
CI HYPERCOAGULATION INDEX: 2.4 RATIO (ref 0–3)
CLOT LYSIS 30M P MA LENFR BLD TEG: 0 % (ref 0–8)
CLOT LYSIS 30M P MA LENFR BLD TEG: 0 % (ref 0–8)
CLOT STRENGTH BLD TEG: 12.7 KD/SC (ref 5.3–13.2)
CLOT STRENGTH BLD TEG: 13 KD/SC (ref 5.3–13.2)
CO2 SERPL-SCNC: 21 MMOL/L (ref 20–32)
CO2 SERPL-SCNC: 24 MMOL/L (ref 20–32)
CO2 SERPL-SCNC: 24 MMOL/L (ref 20–32)
CREAT SERPL-MCNC: 0.77 MG/DL (ref 0.66–1.25)
CREAT SERPL-MCNC: 0.78 MG/DL (ref 0.66–1.25)
CREAT SERPL-MCNC: 0.82 MG/DL (ref 0.66–1.25)
ERYTHROCYTE [DISTWIDTH] IN BLOOD BY AUTOMATED COUNT: 12.3 % (ref 10–15)
ERYTHROCYTE [DISTWIDTH] IN BLOOD BY AUTOMATED COUNT: 12.3 % (ref 10–15)
ERYTHROCYTE [DISTWIDTH] IN BLOOD BY AUTOMATED COUNT: 12.4 % (ref 10–15)
FIBRINOGEN PPP-MCNC: 421 MG/DL (ref 200–420)
G ACTUAL CLOT STRENGTH: 8.8 KD/SC (ref 4.5–11)
GFR SERPL CREATININE-BSD FRML MDRD: >90 ML/MIN/{1.73_M2}
GLUCOSE BLD-MCNC: 117 MG/DL (ref 70–99)
GLUCOSE BLD-MCNC: 187 MG/DL (ref 70–99)
GLUCOSE BLD-MCNC: 192 MG/DL (ref 70–99)
GLUCOSE BLD-MCNC: 198 MG/DL (ref 70–99)
GLUCOSE BLD-MCNC: 199 MG/DL (ref 70–99)
GLUCOSE BLD-MCNC: 199 MG/DL (ref 70–99)
GLUCOSE BLD-MCNC: 201 MG/DL (ref 70–99)
GLUCOSE BLD-MCNC: 214 MG/DL (ref 70–99)
GLUCOSE BLD-MCNC: 256 MG/DL (ref 70–99)
GLUCOSE BLDC GLUCOMTR-MCNC: 115 MG/DL (ref 70–99)
GLUCOSE BLDC GLUCOMTR-MCNC: 188 MG/DL (ref 70–99)
GLUCOSE BLDC GLUCOMTR-MCNC: 193 MG/DL (ref 70–99)
GLUCOSE BLDC GLUCOMTR-MCNC: 202 MG/DL (ref 70–99)
GLUCOSE BLDC GLUCOMTR-MCNC: 215 MG/DL (ref 70–99)
GLUCOSE BLDC GLUCOMTR-MCNC: 218 MG/DL (ref 70–99)
GLUCOSE BLDC GLUCOMTR-MCNC: 224 MG/DL (ref 70–99)
GLUCOSE BLDC GLUCOMTR-MCNC: 238 MG/DL (ref 70–99)
GLUCOSE SERPL-MCNC: 119 MG/DL (ref 70–99)
GLUCOSE SERPL-MCNC: 182 MG/DL (ref 70–99)
GLUCOSE SERPL-MCNC: 209 MG/DL (ref 70–99)
HCO3 BLD-SCNC: 19 MMOL/L (ref 21–28)
HCO3 BLD-SCNC: 22 MMOL/L (ref 21–28)
HCO3 BLD-SCNC: 22 MMOL/L (ref 21–28)
HCO3 BLD-SCNC: 23 MMOL/L (ref 21–28)
HCO3 BLD-SCNC: 24 MMOL/L (ref 21–28)
HCO3 BLD-SCNC: 25 MMOL/L (ref 21–28)
HCO3 BLD-SCNC: 26 MMOL/L (ref 21–28)
HCO3 BLD-SCNC: 26 MMOL/L (ref 21–28)
HCO3 BLDV-SCNC: 23 MMOL/L (ref 21–28)
HCO3 BLDV-SCNC: 25 MMOL/L (ref 21–28)
HCO3 BLDV-SCNC: 27 MMOL/L (ref 21–28)
HCT VFR BLD AUTO: 37 % (ref 40–53)
HCT VFR BLD AUTO: 46.5 % (ref 40–53)
HCT VFR BLD AUTO: 47.2 % (ref 40–53)
HGB BLD-MCNC: 12 G/DL (ref 13.3–17.7)
HGB BLD-MCNC: 12.1 G/DL (ref 13.3–17.7)
HGB BLD-MCNC: 13.7 G/DL (ref 13.3–17.7)
HGB BLD-MCNC: 14.2 G/DL (ref 13.3–17.7)
HGB BLD-MCNC: 14.3 G/DL (ref 13.3–17.7)
HGB BLD-MCNC: 14.5 G/DL (ref 13.3–17.7)
HGB BLD-MCNC: 14.7 G/DL (ref 13.3–17.7)
HGB BLD-MCNC: 14.7 G/DL (ref 13.3–17.7)
HGB BLD-MCNC: 15 G/DL (ref 13.3–17.7)
HGB BLD-MCNC: 15.1 G/DL (ref 13.3–17.7)
HGB BLD-MCNC: 15.4 G/DL (ref 13.3–17.7)
HGB BLD-MCNC: 15.9 G/DL (ref 13.3–17.7)
INR PPP: 1.19 (ref 0.86–1.14)
INR PPP: 1.5 (ref 0.86–1.14)
INR PPP: 1.54 (ref 0.86–1.14)
INTERPRETATION ECG - MUSE: NORMAL
K TIME TO SPEC CLOT STRENGTH: 1.2 MIN (ref 1–3)
K TIME TO SPEC CLOT STRENGTH: 1.3 MINUTE (ref 1–3)
K TIME TO SPEC CLOT STRENGTH: 1.4 MIN (ref 1–3)
KCT BLD-ACNC: 127 SEC (ref 75–150)
KCT BLD-ACNC: 136 SEC (ref 75–150)
KCT BLD-ACNC: 652 SEC (ref 75–150)
KCT BLD-ACNC: 696 SEC (ref 75–150)
KCT BLD-ACNC: 696 SEC (ref 75–150)
LACTATE BLD-SCNC: 1 MMOL/L (ref 0.7–2)
LACTATE BLD-SCNC: 3.1 MMOL/L (ref 0.7–2)
LACTATE BLD-SCNC: 3.2 MMOL/L (ref 0.7–2)
LACTATE BLD-SCNC: 3.4 MMOL/L (ref 0.7–2)
LACTATE BLD-SCNC: 3.5 MMOL/L (ref 0.7–2)
LACTATE BLD-SCNC: 3.6 MMOL/L (ref 0.7–2)
LACTATE BLD-SCNC: 3.7 MMOL/L (ref 0.7–2)
LACTATE BLD-SCNC: 3.8 MMOL/L (ref 0.7–2)
LACTATE BLD-SCNC: 4.5 MMOL/L (ref 0.7–2)
LACTATE BLD-SCNC: 5.4 MMOL/L (ref 0.7–2)
LACTATE BLD-SCNC: 6 MMOL/L (ref 0.7–2)
LACTATE BLD-SCNC: 7.7 MMOL/L (ref 0.7–2)
LACTATE BLD-SCNC: 8.2 MMOL/L (ref 0.7–2)
LACTATE BLD-SCNC: 8.2 MMOL/L (ref 0.7–2)
LY30 LYSIS AT 30 MINUTES: 0.9 % (ref 0–8)
LY60 LYSIS AT 60 MINUTES: 0.5 % (ref 0–15)
LY60 LYSIS AT 60 MINUTES: 2.4 % (ref 0–15)
LY60 LYSIS AT 60 MINUTES: 4.1 % (ref 0–15)
MA MAXIMUM CLOT STRENGTH: 63.8 MM (ref 50–70)
MA MAXIMUM CLOT STRENGTH: 71.8 MM (ref 55–74)
MA MAXIMUM CLOT STRENGTH: 72.3 MM (ref 55–74)
MAGNESIUM SERPL-MCNC: 2.3 MG/DL (ref 1.6–2.3)
MAGNESIUM SERPL-MCNC: 2.4 MG/DL (ref 1.6–2.3)
MAGNESIUM SERPL-MCNC: 2.8 MG/DL (ref 1.6–2.3)
MCH RBC QN AUTO: 31.9 PG (ref 26.5–33)
MCH RBC QN AUTO: 32.2 PG (ref 26.5–33)
MCH RBC QN AUTO: 32.3 PG (ref 26.5–33)
MCHC RBC AUTO-ENTMCNC: 32.5 G/DL (ref 31.5–36.5)
MCHC RBC AUTO-ENTMCNC: 32.6 G/DL (ref 31.5–36.5)
MCHC RBC AUTO-ENTMCNC: 32.7 G/DL (ref 31.5–36.5)
MCV RBC AUTO: 98 FL (ref 78–100)
MCV RBC AUTO: 98 FL (ref 78–100)
MCV RBC AUTO: 99 FL (ref 78–100)
O2/TOTAL GAS SETTING VFR VENT: 100 %
O2/TOTAL GAS SETTING VFR VENT: 100 %
O2/TOTAL GAS SETTING VFR VENT: 21 %
O2/TOTAL GAS SETTING VFR VENT: 40 %
O2/TOTAL GAS SETTING VFR VENT: 50 %
O2/TOTAL GAS SETTING VFR VENT: 60 %
O2/TOTAL GAS SETTING VFR VENT: 60 %
O2/TOTAL GAS SETTING VFR VENT: 70 %
O2/TOTAL GAS SETTING VFR VENT: 70 %
O2/TOTAL GAS SETTING VFR VENT: 80 %
O2/TOTAL GAS SETTING VFR VENT: 80 %
OXYHGB MFR BLD: 97 % (ref 92–100)
OXYHGB MFR BLD: 97 % (ref 92–100)
OXYHGB MFR BLD: 98 % (ref 92–100)
OXYHGB MFR BLD: 99 % (ref 92–100)
OXYHGB MFR BLDV: 75 %
OXYHGB MFR BLDV: 77 %
OXYHGB MFR BLDV: 79 %
OXYHGB MFR BLDV: 82 %
OXYHGB MFR BLDV: 82 %
PCO2 BLD: 37 MM HG (ref 35–45)
PCO2 BLD: 38 MM HG (ref 35–45)
PCO2 BLD: 40 MM HG (ref 35–45)
PCO2 BLD: 41 MM HG (ref 35–45)
PCO2 BLD: 44 MM HG (ref 35–45)
PCO2 BLD: 44 MM HG (ref 35–45)
PCO2 BLD: 45 MM HG (ref 35–45)
PCO2 BLD: 45 MM HG (ref 35–45)
PCO2 BLD: 48 MM HG (ref 35–45)
PCO2 BLD: 50 MM HG (ref 35–45)
PCO2 BLDV: 43 MM HG (ref 40–50)
PCO2 BLDV: 44 MM HG (ref 40–50)
PCO2 BLDV: 46 MM HG (ref 40–50)
PCO2 BLDV: 47 MM HG (ref 40–50)
PCO2 BLDV: 49 MM HG (ref 40–50)
PH BLD: 7.3 PH (ref 7.35–7.45)
PH BLD: 7.31 PH (ref 7.35–7.45)
PH BLD: 7.31 PH (ref 7.35–7.45)
PH BLD: 7.32 PH (ref 7.35–7.45)
PH BLD: 7.33 PH (ref 7.35–7.45)
PH BLD: 7.34 PH (ref 7.35–7.45)
PH BLD: 7.38 PH (ref 7.35–7.45)
PH BLD: 7.39 PH (ref 7.35–7.45)
PH BLD: 7.39 PH (ref 7.35–7.45)
PH BLD: 7.41 PH (ref 7.35–7.45)
PH BLDV: 7.28 PH (ref 7.32–7.43)
PH BLDV: 7.29 PH (ref 7.32–7.43)
PH BLDV: 7.33 PH (ref 7.32–7.43)
PH BLDV: 7.35 PH (ref 7.32–7.43)
PH BLDV: 7.4 PH (ref 7.32–7.43)
PHOSPHATE SERPL-MCNC: 2.6 MG/DL (ref 2.5–4.5)
PHOSPHATE SERPL-MCNC: 3.1 MG/DL (ref 2.5–4.5)
PLATELET # BLD AUTO: 179 10E9/L (ref 150–450)
PLATELET # BLD AUTO: 180 10E9/L (ref 150–450)
PLATELET # BLD AUTO: 183 10E9/L (ref 150–450)
PLATELET # BLD AUTO: 203 10E9/L (ref 150–450)
PO2 BLD: 118 MM HG (ref 80–105)
PO2 BLD: 123 MM HG (ref 80–105)
PO2 BLD: 148 MM HG (ref 80–105)
PO2 BLD: 211 MM HG (ref 80–105)
PO2 BLD: 234 MM HG (ref 80–105)
PO2 BLD: 261 MM HG (ref 80–105)
PO2 BLD: 296 MM HG (ref 80–105)
PO2 BLD: 333 MM HG (ref 80–105)
PO2 BLD: 442 MM HG (ref 80–105)
PO2 BLD: 471 MM HG (ref 80–105)
PO2 BLDV: 41 MM HG (ref 25–47)
PO2 BLDV: 43 MM HG (ref 25–47)
PO2 BLDV: 49 MM HG (ref 25–47)
PO2 BLDV: 50 MM HG (ref 25–47)
PO2 BLDV: 53 MM HG (ref 25–47)
POTASSIUM BLD-SCNC: 3.5 MMOL/L (ref 3.4–5.3)
POTASSIUM BLD-SCNC: 3.6 MMOL/L (ref 3.4–5.3)
POTASSIUM BLD-SCNC: 3.6 MMOL/L (ref 3.4–5.3)
POTASSIUM BLD-SCNC: 4.1 MMOL/L (ref 3.4–5.3)
POTASSIUM BLD-SCNC: 5.1 MMOL/L (ref 3.4–5.3)
POTASSIUM BLD-SCNC: 5.8 MMOL/L (ref 3.4–5.3)
POTASSIUM BLD-SCNC: 6 MMOL/L (ref 3.4–5.3)
POTASSIUM BLD-SCNC: 6.2 MMOL/L (ref 3.4–5.3)
POTASSIUM BLD-SCNC: 6.4 MMOL/L (ref 3.4–5.3)
POTASSIUM SERPL-SCNC: 3.4 MMOL/L (ref 3.4–5.3)
POTASSIUM SERPL-SCNC: 3.6 MMOL/L (ref 3.4–5.3)
POTASSIUM SERPL-SCNC: 4.1 MMOL/L (ref 3.4–5.3)
PROT SERPL-MCNC: 6.4 G/DL (ref 6.8–8.8)
R TIME UNTIL CLOT FORMS: 4.9 MINUTE (ref 5–10)
R TIME UNTIL CLOT FORMS: 5.4 MIN (ref 4–9)
R TIME UNTIL CLOT FORMS: 5.9 MIN (ref 4–9)
RBC # BLD AUTO: 3.76 10E12/L (ref 4.4–5.9)
RBC # BLD AUTO: 4.74 10E12/L (ref 4.4–5.9)
RBC # BLD AUTO: 4.77 10E12/L (ref 4.4–5.9)
SODIUM BLD-SCNC: 137 MMOL/L (ref 133–144)
SODIUM BLD-SCNC: 137 MMOL/L (ref 133–144)
SODIUM BLD-SCNC: 138 MMOL/L (ref 133–144)
SODIUM BLD-SCNC: 139 MMOL/L (ref 133–144)
SODIUM BLD-SCNC: 139 MMOL/L (ref 133–144)
SODIUM BLD-SCNC: 140 MMOL/L (ref 133–144)
SODIUM BLD-SCNC: 141 MMOL/L (ref 133–144)
SODIUM SERPL-SCNC: 138 MMOL/L (ref 133–144)
SODIUM SERPL-SCNC: 142 MMOL/L (ref 133–144)
SODIUM SERPL-SCNC: 143 MMOL/L (ref 133–144)
WBC # BLD AUTO: 10.7 10E9/L (ref 4–11)
WBC # BLD AUTO: 27.3 10E9/L (ref 4–11)
WBC # BLD AUTO: 34.5 10E9/L (ref 4–11)

## 2021-01-22 PROCEDURE — 85396 CLOTTING ASSAY WHOLE BLOOD: CPT | Performed by: ANESTHESIOLOGY

## 2021-01-22 PROCEDURE — 93010 ELECTROCARDIOGRAM REPORT: CPT | Mod: 59 | Performed by: INTERNAL MEDICINE

## 2021-01-22 PROCEDURE — 250N000009 HC RX 250: Performed by: STUDENT IN AN ORGANIZED HEALTH CARE EDUCATION/TRAINING PROGRAM

## 2021-01-22 PROCEDURE — 83605 ASSAY OF LACTIC ACID: CPT | Performed by: STUDENT IN AN ORGANIZED HEALTH CARE EDUCATION/TRAINING PROGRAM

## 2021-01-22 PROCEDURE — 84295 ASSAY OF SERUM SODIUM: CPT

## 2021-01-22 PROCEDURE — 85347 COAGULATION TIME ACTIVATED: CPT

## 2021-01-22 PROCEDURE — 82330 ASSAY OF CALCIUM: CPT | Performed by: STUDENT IN AN ORGANIZED HEALTH CARE EDUCATION/TRAINING PROGRAM

## 2021-01-22 PROCEDURE — 250N000006 HC OR RX SURGIFLO W/THROMBIN KIT 2ML 1991 OPNP: Performed by: SURGERY

## 2021-01-22 PROCEDURE — 272N000001 HC OR GENERAL SUPPLY STERILE: Performed by: SURGERY

## 2021-01-22 PROCEDURE — P9041 ALBUMIN (HUMAN),5%, 50ML: HCPCS | Performed by: STUDENT IN AN ORGANIZED HEALTH CARE EDUCATION/TRAINING PROGRAM

## 2021-01-22 PROCEDURE — 258N000001 HC RX 258: Performed by: NURSE ANESTHETIST, CERTIFIED REGISTERED

## 2021-01-22 PROCEDURE — 258N000003 HC RX IP 258 OP 636: Performed by: NURSE ANESTHETIST, CERTIFIED REGISTERED

## 2021-01-22 PROCEDURE — 80053 COMPREHEN METABOLIC PANEL: CPT | Performed by: STUDENT IN AN ORGANIZED HEALTH CARE EDUCATION/TRAINING PROGRAM

## 2021-01-22 PROCEDURE — 71045 X-RAY EXAM CHEST 1 VIEW: CPT | Mod: 26 | Performed by: RADIOLOGY

## 2021-01-22 PROCEDURE — 84132 ASSAY OF SERUM POTASSIUM: CPT

## 2021-01-22 PROCEDURE — 250N000011 HC RX IP 250 OP 636: Performed by: PHYSICIAN ASSISTANT

## 2021-01-22 PROCEDURE — 999N000065 XR ABDOMEN PORT 1 VW

## 2021-01-22 PROCEDURE — 5A1221Z PERFORMANCE OF CARDIAC OUTPUT, CONTINUOUS: ICD-10-PCS | Performed by: SURGERY

## 2021-01-22 PROCEDURE — 250N000009 HC RX 250: Performed by: PHYSICIAN ASSISTANT

## 2021-01-22 PROCEDURE — 85610 PROTHROMBIN TIME: CPT | Performed by: SURGERY

## 2021-01-22 PROCEDURE — 999N000015 HC STATISTIC ARTERIAL MONITORING DAILY

## 2021-01-22 PROCEDURE — 200N000002 HC R&B ICU UMMC

## 2021-01-22 PROCEDURE — 85049 AUTOMATED PLATELET COUNT: CPT | Performed by: SURGERY

## 2021-01-22 PROCEDURE — 82810 BLOOD GASES O2 SAT ONLY: CPT

## 2021-01-22 PROCEDURE — 85610 PROTHROMBIN TIME: CPT | Performed by: STUDENT IN AN ORGANIZED HEALTH CARE EDUCATION/TRAINING PROGRAM

## 2021-01-22 PROCEDURE — 258N000003 HC RX IP 258 OP 636: Performed by: STUDENT IN AN ORGANIZED HEALTH CARE EDUCATION/TRAINING PROGRAM

## 2021-01-22 PROCEDURE — 250N000011 HC RX IP 250 OP 636: Performed by: NURSE ANESTHETIST, CERTIFIED REGISTERED

## 2021-01-22 PROCEDURE — 80048 BASIC METABOLIC PNL TOTAL CA: CPT | Performed by: NURSE PRACTITIONER

## 2021-01-22 PROCEDURE — 250N000011 HC RX IP 250 OP 636: Performed by: STUDENT IN AN ORGANIZED HEALTH CARE EDUCATION/TRAINING PROGRAM

## 2021-01-22 PROCEDURE — 85027 COMPLETE CBC AUTOMATED: CPT | Performed by: STUDENT IN AN ORGANIZED HEALTH CARE EDUCATION/TRAINING PROGRAM

## 2021-01-22 PROCEDURE — 85027 COMPLETE CBC AUTOMATED: CPT | Performed by: NURSE PRACTITIONER

## 2021-01-22 PROCEDURE — 250N000011 HC RX IP 250 OP 636: Performed by: INTERNAL MEDICINE

## 2021-01-22 PROCEDURE — 94002 VENT MGMT INPAT INIT DAY: CPT

## 2021-01-22 PROCEDURE — 272N000004 HC RX 272: Performed by: SURGERY

## 2021-01-22 PROCEDURE — 83735 ASSAY OF MAGNESIUM: CPT | Performed by: STUDENT IN AN ORGANIZED HEALTH CARE EDUCATION/TRAINING PROGRAM

## 2021-01-22 PROCEDURE — 250N000011 HC RX IP 250 OP 636: Performed by: SURGERY

## 2021-01-22 PROCEDURE — 93005 ELECTROCARDIOGRAM TRACING: CPT

## 2021-01-22 PROCEDURE — 360N000079 HC SURGERY LEVEL 6, PER MIN: Performed by: SURGERY

## 2021-01-22 PROCEDURE — 250N000012 HC RX MED GY IP 250 OP 636 PS 637: Performed by: SURGERY

## 2021-01-22 PROCEDURE — 250N000009 HC RX 250: Performed by: NURSE ANESTHETIST, CERTIFIED REGISTERED

## 2021-01-22 PROCEDURE — 85396 CLOTTING ASSAY WHOLE BLOOD: CPT | Performed by: SURGERY

## 2021-01-22 PROCEDURE — 84100 ASSAY OF PHOSPHORUS: CPT | Performed by: NURSE PRACTITIONER

## 2021-01-22 PROCEDURE — 258N000003 HC RX IP 258 OP 636: Performed by: INTERNAL MEDICINE

## 2021-01-22 PROCEDURE — 258N000003 HC RX IP 258 OP 636: Performed by: PHYSICIAN ASSISTANT

## 2021-01-22 PROCEDURE — 02100Z9 BYPASS CORONARY ARTERY, ONE ARTERY FROM LEFT INTERNAL MAMMARY, OPEN APPROACH: ICD-10-PCS | Performed by: SURGERY

## 2021-01-22 PROCEDURE — 250N000024 HC ISOFLURANE, PER MIN: Performed by: SURGERY

## 2021-01-22 PROCEDURE — 250N000011 HC RX IP 250 OP 636: Performed by: ANESTHESIOLOGY

## 2021-01-22 PROCEDURE — 82435 ASSAY OF BLOOD CHLORIDE: CPT

## 2021-01-22 PROCEDURE — 272N000085 HC PACK CELL SAVER CSP: Performed by: SURGERY

## 2021-01-22 PROCEDURE — 85730 THROMBOPLASTIN TIME PARTIAL: CPT | Performed by: STUDENT IN AN ORGANIZED HEALTH CARE EDUCATION/TRAINING PROGRAM

## 2021-01-22 PROCEDURE — 272N000088 HC PUMP APP ADULT PERFUSION: Performed by: SURGERY

## 2021-01-22 PROCEDURE — 999N000045 HC STATISTIC DAILY SWAN MONITORING

## 2021-01-22 PROCEDURE — 74018 RADEX ABDOMEN 1 VIEW: CPT | Mod: 26 | Performed by: RADIOLOGY

## 2021-01-22 PROCEDURE — 250N000013 HC RX MED GY IP 250 OP 250 PS 637: Performed by: PHYSICIAN ASSISTANT

## 2021-01-22 PROCEDURE — 82805 BLOOD GASES W/O2 SATURATION: CPT | Performed by: STUDENT IN AN ORGANIZED HEALTH CARE EDUCATION/TRAINING PROGRAM

## 2021-01-22 PROCEDURE — 021109W BYPASS CORONARY ARTERY, TWO ARTERIES FROM AORTA WITH AUTOLOGOUS VENOUS TISSUE, OPEN APPROACH: ICD-10-PCS | Performed by: SURGERY

## 2021-01-22 PROCEDURE — 999N000065 XR CHEST PORT 1 VW

## 2021-01-22 PROCEDURE — 0T9B70Z DRAINAGE OF BLADDER WITH DRAINAGE DEVICE, VIA NATURAL OR ARTIFICIAL OPENING: ICD-10-PCS | Performed by: SURGERY

## 2021-01-22 PROCEDURE — 370N000017 HC ANESTHESIA TECHNICAL FEE, PER MIN: Performed by: SURGERY

## 2021-01-22 PROCEDURE — 82803 BLOOD GASES ANY COMBINATION: CPT

## 2021-01-22 PROCEDURE — 410N000003 HC PER-PERFUSION 1ST 30 MIN: Performed by: SURGERY

## 2021-01-22 PROCEDURE — 84100 ASSAY OF PHOSPHORUS: CPT | Performed by: STUDENT IN AN ORGANIZED HEALTH CARE EDUCATION/TRAINING PROGRAM

## 2021-01-22 PROCEDURE — 06BQ4ZZ EXCISION OF LEFT SAPHENOUS VEIN, PERCUTANEOUS ENDOSCOPIC APPROACH: ICD-10-PCS | Performed by: SURGERY

## 2021-01-22 PROCEDURE — 85730 THROMBOPLASTIN TIME PARTIAL: CPT | Performed by: SURGERY

## 2021-01-22 PROCEDURE — 83605 ASSAY OF LACTIC ACID: CPT

## 2021-01-22 PROCEDURE — 99221 1ST HOSP IP/OBS SF/LOW 40: CPT | Mod: GC | Performed by: INTERNAL MEDICINE

## 2021-01-22 PROCEDURE — 999N000075 HC STATISTIC IABP MONITORING

## 2021-01-22 PROCEDURE — 33533 CABG ARTERIAL SINGLE: CPT | Mod: GC | Performed by: SURGERY

## 2021-01-22 PROCEDURE — 258N000003 HC RX IP 258 OP 636: Performed by: ANESTHESIOLOGY

## 2021-01-22 PROCEDURE — 410N000004: Performed by: SURGERY

## 2021-01-22 PROCEDURE — 82805 BLOOD GASES W/O2 SATURATION: CPT | Performed by: INTERNAL MEDICINE

## 2021-01-22 PROCEDURE — 999N000185 HC STATISTIC TRANSPORT TIME EA 15 MIN

## 2021-01-22 PROCEDURE — HZ2ZZZZ DETOXIFICATION SERVICES FOR SUBSTANCE ABUSE TREATMENT: ICD-10-PCS | Performed by: SURGERY

## 2021-01-22 PROCEDURE — 999N000157 HC STATISTIC RCP TIME EA 10 MIN

## 2021-01-22 PROCEDURE — 85610 PROTHROMBIN TIME: CPT | Performed by: NURSE PRACTITIONER

## 2021-01-22 PROCEDURE — 250N000009 HC RX 250: Performed by: SURGERY

## 2021-01-22 PROCEDURE — 33518 CABG ARTERY-VEIN TWO: CPT | Mod: GC | Performed by: SURGERY

## 2021-01-22 PROCEDURE — 82947 ASSAY GLUCOSE BLOOD QUANT: CPT

## 2021-01-22 PROCEDURE — 83735 ASSAY OF MAGNESIUM: CPT | Performed by: NURSE PRACTITIONER

## 2021-01-22 PROCEDURE — 80048 BASIC METABOLIC PNL TOTAL CA: CPT | Performed by: STUDENT IN AN ORGANIZED HEALTH CARE EDUCATION/TRAINING PROGRAM

## 2021-01-22 PROCEDURE — 999N001017 HC STATISTIC GLUCOSE BY METER IP

## 2021-01-22 PROCEDURE — 258N000003 HC RX IP 258 OP 636: Performed by: SURGERY

## 2021-01-22 PROCEDURE — 85384 FIBRINOGEN ACTIVITY: CPT | Performed by: SURGERY

## 2021-01-22 PROCEDURE — 33508 ENDOSCOPIC VEIN HARVEST: CPT | Mod: GC | Performed by: SURGERY

## 2021-01-22 PROCEDURE — 82330 ASSAY OF CALCIUM: CPT

## 2021-01-22 PROCEDURE — 250N000013 HC RX MED GY IP 250 OP 250 PS 637: Performed by: STUDENT IN AN ORGANIZED HEALTH CARE EDUCATION/TRAINING PROGRAM

## 2021-01-22 RX ORDER — ALBUMIN, HUMAN INJ 5% 5 %
500 SOLUTION INTRAVENOUS ONCE
Status: COMPLETED | OUTPATIENT
Start: 2021-01-22 | End: 2021-01-22

## 2021-01-22 RX ORDER — DOBUTAMINE HYDROCHLORIDE 200 MG/100ML
2.5-2 INJECTION INTRAVENOUS CONTINUOUS
Status: DISCONTINUED | OUTPATIENT
Start: 2021-01-22 | End: 2021-01-22

## 2021-01-22 RX ORDER — PROPOFOL 10 MG/ML
5-75 INJECTION, EMULSION INTRAVENOUS CONTINUOUS
Status: DISCONTINUED | OUTPATIENT
Start: 2021-01-22 | End: 2021-01-23

## 2021-01-22 RX ORDER — ONDANSETRON 2 MG/ML
4 INJECTION INTRAMUSCULAR; INTRAVENOUS EVERY 6 HOURS PRN
Status: DISCONTINUED | OUTPATIENT
Start: 2021-01-22 | End: 2021-01-30

## 2021-01-22 RX ORDER — DEXTROSE MONOHYDRATE 25 G/50ML
INJECTION, SOLUTION INTRAVENOUS PRN
Status: DISCONTINUED | OUTPATIENT
Start: 2021-01-22 | End: 2021-01-22

## 2021-01-22 RX ORDER — ALBUMIN, HUMAN INJ 5% 5 %
500-1000 SOLUTION INTRAVENOUS
Status: COMPLETED | OUTPATIENT
Start: 2021-01-22 | End: 2021-01-22

## 2021-01-22 RX ORDER — ONDANSETRON 4 MG/1
4 TABLET, ORALLY DISINTEGRATING ORAL EVERY 6 HOURS PRN
Status: DISCONTINUED | OUTPATIENT
Start: 2021-01-22 | End: 2021-02-01 | Stop reason: HOSPADM

## 2021-01-22 RX ORDER — POTASSIUM CHLORIDE 29.8 MG/ML
20 INJECTION INTRAVENOUS ONCE
Status: COMPLETED | OUTPATIENT
Start: 2021-01-22 | End: 2021-01-22

## 2021-01-22 RX ORDER — OXYCODONE HYDROCHLORIDE 5 MG/1
5-10 TABLET ORAL
Status: DISCONTINUED | OUTPATIENT
Start: 2021-01-22 | End: 2021-01-28

## 2021-01-22 RX ORDER — LIDOCAINE HYDROCHLORIDE 20 MG/ML
INJECTION, SOLUTION INFILTRATION; PERINEURAL PRN
Status: DISCONTINUED | OUTPATIENT
Start: 2021-01-22 | End: 2021-01-22

## 2021-01-22 RX ORDER — NOREPINEPHRINE BITARTRATE 0.06 MG/ML
0.03-0.4 INJECTION, SOLUTION INTRAVENOUS CONTINUOUS
Status: DISCONTINUED | OUTPATIENT
Start: 2021-01-22 | End: 2021-01-23

## 2021-01-22 RX ORDER — GABAPENTIN 300 MG/1
300 CAPSULE ORAL 2 TIMES DAILY
Status: COMPLETED | OUTPATIENT
Start: 2021-01-22 | End: 2021-01-25

## 2021-01-22 RX ORDER — NALOXONE HYDROCHLORIDE 0.4 MG/ML
0.4 INJECTION, SOLUTION INTRAMUSCULAR; INTRAVENOUS; SUBCUTANEOUS
Status: DISCONTINUED | OUTPATIENT
Start: 2021-01-22 | End: 2021-02-01 | Stop reason: HOSPADM

## 2021-01-22 RX ORDER — DEXTROSE MONOHYDRATE 100 MG/ML
INJECTION, SOLUTION INTRAVENOUS CONTINUOUS PRN
Status: DISCONTINUED | OUTPATIENT
Start: 2021-01-22 | End: 2021-02-01 | Stop reason: HOSPADM

## 2021-01-22 RX ORDER — AMOXICILLIN 250 MG
1 CAPSULE ORAL 2 TIMES DAILY
Status: DISCONTINUED | OUTPATIENT
Start: 2021-01-22 | End: 2021-01-24

## 2021-01-22 RX ORDER — METHOCARBAMOL 750 MG/1
750 TABLET, FILM COATED ORAL 4 TIMES DAILY PRN
Status: DISCONTINUED | OUTPATIENT
Start: 2021-01-22 | End: 2021-02-01 | Stop reason: HOSPADM

## 2021-01-22 RX ORDER — PROCHLORPERAZINE MALEATE 5 MG
10 TABLET ORAL EVERY 6 HOURS PRN
Status: DISCONTINUED | OUTPATIENT
Start: 2021-01-22 | End: 2021-02-01 | Stop reason: HOSPADM

## 2021-01-22 RX ORDER — SODIUM CHLORIDE, SODIUM GLUCONATE, SODIUM ACETATE, POTASSIUM CHLORIDE AND MAGNESIUM CHLORIDE 526; 502; 368; 37; 30 MG/100ML; MG/100ML; MG/100ML; MG/100ML; MG/100ML
INJECTION, SOLUTION INTRAVENOUS CONTINUOUS PRN
Status: DISCONTINUED | OUTPATIENT
Start: 2021-01-22 | End: 2021-01-22

## 2021-01-22 RX ORDER — NICOTINE POLACRILEX 4 MG
15-30 LOZENGE BUCCAL
Status: DISCONTINUED | OUTPATIENT
Start: 2021-01-22 | End: 2021-01-22

## 2021-01-22 RX ORDER — DEXTROSE MONOHYDRATE 25 G/50ML
25-50 INJECTION, SOLUTION INTRAVENOUS
Status: DISCONTINUED | OUTPATIENT
Start: 2021-01-22 | End: 2021-01-22

## 2021-01-22 RX ORDER — PROPOFOL 10 MG/ML
INJECTION, EMULSION INTRAVENOUS CONTINUOUS PRN
Status: DISCONTINUED | OUTPATIENT
Start: 2021-01-22 | End: 2021-01-22

## 2021-01-22 RX ORDER — POTASSIUM CHLORIDE 29.8 MG/ML
20 INJECTION INTRAVENOUS
Status: COMPLETED | OUTPATIENT
Start: 2021-01-23 | End: 2021-01-23

## 2021-01-22 RX ORDER — NALOXONE HYDROCHLORIDE 0.4 MG/ML
0.2 INJECTION, SOLUTION INTRAMUSCULAR; INTRAVENOUS; SUBCUTANEOUS
Status: DISCONTINUED | OUTPATIENT
Start: 2021-01-22 | End: 2021-02-01 | Stop reason: HOSPADM

## 2021-01-22 RX ORDER — ACETAMINOPHEN 325 MG/1
975 TABLET ORAL EVERY 8 HOURS
Status: COMPLETED | OUTPATIENT
Start: 2021-01-22 | End: 2021-01-25

## 2021-01-22 RX ORDER — HEPARIN SODIUM 1000 [USP'U]/ML
INJECTION, SOLUTION INTRAVENOUS; SUBCUTANEOUS PRN
Status: DISCONTINUED | OUTPATIENT
Start: 2021-01-22 | End: 2021-01-22

## 2021-01-22 RX ORDER — NITROGLYCERIN 10 MG/100ML
INJECTION INTRAVENOUS PRN
Status: DISCONTINUED | OUTPATIENT
Start: 2021-01-22 | End: 2021-01-22

## 2021-01-22 RX ORDER — MUPIROCIN 20 MG/G
0.5 OINTMENT TOPICAL 2 TIMES DAILY
Status: DISPENSED | OUTPATIENT
Start: 2021-01-22 | End: 2021-01-27

## 2021-01-22 RX ORDER — CEFAZOLIN SODIUM 2 G/100ML
2 INJECTION, SOLUTION INTRAVENOUS EVERY 8 HOURS
Status: COMPLETED | OUTPATIENT
Start: 2021-01-22 | End: 2021-01-23

## 2021-01-22 RX ORDER — ASPIRIN 325 MG
325 TABLET ORAL DAILY
Status: DISCONTINUED | OUTPATIENT
Start: 2021-01-23 | End: 2021-01-24

## 2021-01-22 RX ORDER — FENTANYL CITRATE 50 UG/ML
INJECTION, SOLUTION INTRAMUSCULAR; INTRAVENOUS PRN
Status: DISCONTINUED | OUTPATIENT
Start: 2021-01-22 | End: 2021-01-22

## 2021-01-22 RX ORDER — DEXTROSE MONOHYDRATE, SODIUM CHLORIDE, AND POTASSIUM CHLORIDE 50; 1.49; 4.5 G/1000ML; G/1000ML; G/1000ML
INJECTION, SOLUTION INTRAVENOUS CONTINUOUS
Status: DISCONTINUED | OUTPATIENT
Start: 2021-01-22 | End: 2021-01-22 | Stop reason: CLARIF

## 2021-01-22 RX ORDER — POTASSIUM CHLORIDE 29.8 MG/ML
20 INJECTION INTRAVENOUS ONCE
Status: DISCONTINUED | OUTPATIENT
Start: 2021-01-22 | End: 2021-01-22

## 2021-01-22 RX ORDER — LIDOCAINE 40 MG/G
CREAM TOPICAL
Status: DISCONTINUED | OUTPATIENT
Start: 2021-01-22 | End: 2021-02-01 | Stop reason: HOSPADM

## 2021-01-22 RX ORDER — PROTAMINE SULFATE 10 MG/ML
INJECTION, SOLUTION INTRAVENOUS PRN
Status: DISCONTINUED | OUTPATIENT
Start: 2021-01-22 | End: 2021-01-22

## 2021-01-22 RX ORDER — HYDROXYZINE HYDROCHLORIDE 25 MG/1
25 TABLET, FILM COATED ORAL EVERY 6 HOURS PRN
Status: DISCONTINUED | OUTPATIENT
Start: 2021-01-22 | End: 2021-01-23

## 2021-01-22 RX ORDER — PROPOFOL 10 MG/ML
INJECTION, EMULSION INTRAVENOUS PRN
Status: DISCONTINUED | OUTPATIENT
Start: 2021-01-22 | End: 2021-01-22

## 2021-01-22 RX ORDER — ACETAMINOPHEN 325 MG/1
650 TABLET ORAL EVERY 4 HOURS PRN
Status: DISCONTINUED | OUTPATIENT
Start: 2021-01-25 | End: 2021-01-28

## 2021-01-22 RX ORDER — AMOXICILLIN 250 MG
2 CAPSULE ORAL 2 TIMES DAILY
Status: DISCONTINUED | OUTPATIENT
Start: 2021-01-22 | End: 2021-01-24

## 2021-01-22 RX ORDER — MEPERIDINE HYDROCHLORIDE 25 MG/ML
12.5-25 INJECTION INTRAMUSCULAR; INTRAVENOUS; SUBCUTANEOUS
Status: DISCONTINUED | OUTPATIENT
Start: 2021-01-22 | End: 2021-01-23

## 2021-01-22 RX ORDER — HYDROMORPHONE HYDROCHLORIDE 1 MG/ML
.3-.5 INJECTION, SOLUTION INTRAMUSCULAR; INTRAVENOUS; SUBCUTANEOUS
Status: DISCONTINUED | OUTPATIENT
Start: 2021-01-22 | End: 2021-01-30

## 2021-01-22 RX ORDER — KETAMINE HYDROCHLORIDE 10 MG/ML
INJECTION INTRAMUSCULAR; INTRAVENOUS PRN
Status: DISCONTINUED | OUTPATIENT
Start: 2021-01-22 | End: 2021-01-22

## 2021-01-22 RX ORDER — HYDRALAZINE HYDROCHLORIDE 20 MG/ML
10 INJECTION INTRAMUSCULAR; INTRAVENOUS EVERY 30 MIN PRN
Status: DISCONTINUED | OUTPATIENT
Start: 2021-01-22 | End: 2021-02-01 | Stop reason: HOSPADM

## 2021-01-22 RX ADMIN — HUMAN INSULIN 12 UNITS/HR: 100 INJECTION, SOLUTION SUBCUTANEOUS at 21:43

## 2021-01-22 RX ADMIN — NOREPINEPHRINE BITARTRATE 6.4 MCG: 1 INJECTION, SOLUTION, CONCENTRATE INTRAVENOUS at 11:04

## 2021-01-22 RX ADMIN — CALCIUM GLUCONATE 1 G: 98 INJECTION, SOLUTION INTRAVENOUS at 18:14

## 2021-01-22 RX ADMIN — DEXMEDETOMIDINE HYDROCHLORIDE 0.8 MCG/KG/HR: 100 INJECTION, SOLUTION INTRAVENOUS at 12:56

## 2021-01-22 RX ADMIN — NOREPINEPHRINE BITARTRATE 3.2 MCG: 1 INJECTION, SOLUTION, CONCENTRATE INTRAVENOUS at 10:59

## 2021-01-22 RX ADMIN — AMINOCAPROIC ACID 1.25 G/HR: 250 INJECTION, SOLUTION INTRAVENOUS at 09:37

## 2021-01-22 RX ADMIN — CEFAZOLIN SODIUM 2 G: 2 INJECTION, SOLUTION INTRAVENOUS at 09:15

## 2021-01-22 RX ADMIN — SODIUM CHLORIDE, SODIUM GLUCONATE, SODIUM ACETATE, POTASSIUM CHLORIDE AND MAGNESIUM CHLORIDE: 526; 502; 368; 37; 30 INJECTION, SOLUTION INTRAVENOUS at 09:45

## 2021-01-22 RX ADMIN — NITROGLYCERIN 100 MCG: 10 INJECTION INTRAVENOUS at 13:01

## 2021-01-22 RX ADMIN — EPINEPHRINE 0.03 MCG/KG/MIN: 1 INJECTION PARENTERAL at 07:45

## 2021-01-22 RX ADMIN — PROPOFOL 30 MCG/KG/MIN: 10 INJECTION, EMULSION INTRAVENOUS at 16:00

## 2021-01-22 RX ADMIN — PROPOFOL 50 MCG/KG/MIN: 10 INJECTION, EMULSION INTRAVENOUS at 13:47

## 2021-01-22 RX ADMIN — MIDAZOLAM 3 MG: 1 INJECTION INTRAMUSCULAR; INTRAVENOUS at 07:57

## 2021-01-22 RX ADMIN — ROCURONIUM BROMIDE 100 MG: 10 INJECTION INTRAVENOUS at 08:00

## 2021-01-22 RX ADMIN — FENTANYL CITRATE 100 MCG: 50 INJECTION, SOLUTION INTRAMUSCULAR; INTRAVENOUS at 10:42

## 2021-01-22 RX ADMIN — HYDROMORPHONE HYDROCHLORIDE 1 MG: 1 INJECTION, SOLUTION INTRAMUSCULAR; INTRAVENOUS; SUBCUTANEOUS at 13:04

## 2021-01-22 RX ADMIN — ALBUMIN HUMAN 1000 ML: 0.05 INJECTION, SOLUTION INTRAVENOUS at 16:37

## 2021-01-22 RX ADMIN — Medication 0.03 MCG/KG/MIN: at 11:29

## 2021-01-22 RX ADMIN — SODIUM CHLORIDE, SODIUM GLUCONATE, SODIUM ACETATE, POTASSIUM CHLORIDE AND MAGNESIUM CHLORIDE: 526; 502; 368; 37; 30 INJECTION, SOLUTION INTRAVENOUS at 08:00

## 2021-01-22 RX ADMIN — ASPIRIN 162 MG: 81 TABLET, CHEWABLE ORAL at 06:08

## 2021-01-22 RX ADMIN — FENTANYL CITRATE 100 MCG: 50 INJECTION, SOLUTION INTRAMUSCULAR; INTRAVENOUS at 09:40

## 2021-01-22 RX ADMIN — FENTANYL CITRATE 100 MCG: 50 INJECTION, SOLUTION INTRAMUSCULAR; INTRAVENOUS at 09:33

## 2021-01-22 RX ADMIN — NITROGLYCERIN 100 MCG: 10 INJECTION INTRAVENOUS at 13:04

## 2021-01-22 RX ADMIN — FENTANYL CITRATE 150 MCG: 50 INJECTION, SOLUTION INTRAMUSCULAR; INTRAVENOUS at 13:03

## 2021-01-22 RX ADMIN — Medication 2.4 UNITS/HR: at 19:57

## 2021-01-22 RX ADMIN — NOREPINEPHRINE BITARTRATE 6.4 MCG: 1 INJECTION, SOLUTION, CONCENTRATE INTRAVENOUS at 13:10

## 2021-01-22 RX ADMIN — CHLORHEXIDINE GLUCONATE 0.12% ORAL RINSE 10 ML: 1.2 LIQUID ORAL at 07:25

## 2021-01-22 RX ADMIN — EPINEPHRINE 0.06 MCG/KG/MIN: 1 INJECTION PARENTERAL at 19:58

## 2021-01-22 RX ADMIN — CEFAZOLIN SODIUM 2 G: 2 INJECTION, SOLUTION INTRAVENOUS at 21:26

## 2021-01-22 RX ADMIN — MIDAZOLAM 2 MG: 1 INJECTION INTRAMUSCULAR; INTRAVENOUS at 07:52

## 2021-01-22 RX ADMIN — VANCOMYCIN HYDROCHLORIDE 1500 MG: 100 INJECTION, POWDER, LYOPHILIZED, FOR SOLUTION INTRAVENOUS at 09:30

## 2021-01-22 RX ADMIN — SODIUM CHLORIDE, POTASSIUM CHLORIDE, SODIUM LACTATE AND CALCIUM CHLORIDE 500 ML: 600; 310; 30; 20 INJECTION, SOLUTION INTRAVENOUS at 17:07

## 2021-01-22 RX ADMIN — FENTANYL CITRATE 100 MCG: 50 INJECTION, SOLUTION INTRAMUSCULAR; INTRAVENOUS at 09:59

## 2021-01-22 RX ADMIN — DEXTROSE MONOHYDRATE 20 ML: 25 INJECTION, SOLUTION INTRAVENOUS at 12:48

## 2021-01-22 RX ADMIN — LIDOCAINE HYDROCHLORIDE 100 MG: 20 INJECTION, SOLUTION INFILTRATION; PERINEURAL at 07:57

## 2021-01-22 RX ADMIN — HEPARIN SODIUM 5000 UNITS: 5000 INJECTION, SOLUTION INTRAVENOUS; SUBCUTANEOUS at 00:13

## 2021-01-22 RX ADMIN — POTASSIUM CHLORIDE 20 MEQ: 29.8 INJECTION, SOLUTION INTRAVENOUS at 17:02

## 2021-01-22 RX ADMIN — NOREPINEPHRINE BITARTRATE 3.2 MCG: 1 INJECTION, SOLUTION, CONCENTRATE INTRAVENOUS at 11:03

## 2021-01-22 RX ADMIN — SODIUM CHLORIDE 7.5 G: 900 INJECTION, SOLUTION INTRAVENOUS at 08:34

## 2021-01-22 RX ADMIN — GABAPENTIN 300 MG: 300 CAPSULE ORAL at 20:05

## 2021-01-22 RX ADMIN — PROPOFOL 20 MG: 10 INJECTION, EMULSION INTRAVENOUS at 08:00

## 2021-01-22 RX ADMIN — PROPOFOL 20 MCG/KG/MIN: 10 INJECTION, EMULSION INTRAVENOUS at 23:50

## 2021-01-22 RX ADMIN — NOREPINEPHRINE BITARTRATE 3.2 MCG: 1 INJECTION, SOLUTION, CONCENTRATE INTRAVENOUS at 10:16

## 2021-01-22 RX ADMIN — SUGAMMADEX 200 MG: 100 INJECTION, SOLUTION INTRAVENOUS at 14:35

## 2021-01-22 RX ADMIN — Medication 50 MCG/HR: at 16:02

## 2021-01-22 RX ADMIN — ACETAMINOPHEN 975 MG: 325 TABLET, FILM COATED ORAL at 06:09

## 2021-01-22 RX ADMIN — SODIUM CHLORIDE, SODIUM GLUCONATE, SODIUM ACETATE, POTASSIUM CHLORIDE AND MAGNESIUM CHLORIDE: 526; 502; 368; 37; 30 INJECTION, SOLUTION INTRAVENOUS at 07:42

## 2021-01-22 RX ADMIN — HUMAN INSULIN 2 UNITS/HR: 100 INJECTION, SOLUTION SUBCUTANEOUS at 10:48

## 2021-01-22 RX ADMIN — SODIUM PHOSPHATE, MONOBASIC, MONOHYDRATE 9 MMOL: 276; 142 INJECTION, SOLUTION INTRAVENOUS at 16:39

## 2021-01-22 RX ADMIN — ALBUMIN HUMAN 500 ML: 0.05 INJECTION, SOLUTION INTRAVENOUS at 18:59

## 2021-01-22 RX ADMIN — FENTANYL CITRATE 100 MCG: 50 INJECTION, SOLUTION INTRAMUSCULAR; INTRAVENOUS at 10:55

## 2021-01-22 RX ADMIN — POTASSIUM CHLORIDE 20 MEQ: 29.8 INJECTION, SOLUTION INTRAVENOUS at 23:58

## 2021-01-22 RX ADMIN — DOCUSATE SODIUM AND SENNOSIDES 1 TABLET: 8.6; 5 TABLET, FILM COATED ORAL at 20:05

## 2021-01-22 RX ADMIN — Medication 50 MG: at 07:59

## 2021-01-22 RX ADMIN — CEFAZOLIN SODIUM 1 G: 1 INJECTION, POWDER, FOR SOLUTION INTRAMUSCULAR; INTRAVENOUS at 11:15

## 2021-01-22 RX ADMIN — ROCURONIUM BROMIDE 50 MG: 10 INJECTION INTRAVENOUS at 09:31

## 2021-01-22 RX ADMIN — HUMAN INSULIN 8 UNITS/HR: 100 INJECTION, SOLUTION SUBCUTANEOUS at 17:10

## 2021-01-22 RX ADMIN — FAMOTIDINE 20 MG: 20 TABLET, FILM COATED ORAL at 06:08

## 2021-01-22 RX ADMIN — ROCURONIUM BROMIDE 50 MG: 10 INJECTION INTRAVENOUS at 10:41

## 2021-01-22 RX ADMIN — ACETAMINOPHEN 975 MG: 325 TABLET, FILM COATED ORAL at 17:02

## 2021-01-22 RX ADMIN — ROCURONIUM BROMIDE 50 MG: 10 INJECTION INTRAVENOUS at 12:50

## 2021-01-22 RX ADMIN — PROTAMINE SULFATE 250 MG: 10 INJECTION, SOLUTION INTRAVENOUS at 12:57

## 2021-01-22 RX ADMIN — FENTANYL CITRATE 500 MCG: 50 INJECTION, SOLUTION INTRAMUSCULAR; INTRAVENOUS at 07:57

## 2021-01-22 RX ADMIN — VASOPRESSIN 2.4 UNITS/HR: 20 INJECTION INTRAVENOUS at 07:46

## 2021-01-22 RX ADMIN — ACETAMINOPHEN 975 MG: 325 TABLET, FILM COATED ORAL at 23:05

## 2021-01-22 RX ADMIN — GABAPENTIN 300 MG: 300 CAPSULE ORAL at 06:07

## 2021-01-22 RX ADMIN — MUPIROCIN 0.5 G: 20 OINTMENT TOPICAL at 20:36

## 2021-01-22 RX ADMIN — CEFAZOLIN SODIUM 1 G: 1 INJECTION, POWDER, FOR SOLUTION INTRAMUSCULAR; INTRAVENOUS at 13:16

## 2021-01-22 RX ADMIN — HEPARIN SODIUM 37000 UNITS: 1000 INJECTION INTRAVENOUS; SUBCUTANEOUS at 10:41

## 2021-01-22 ASSESSMENT — ACTIVITIES OF DAILY LIVING (ADL)
ADLS_ACUITY_SCORE: 19
ADLS_ACUITY_SCORE: 14

## 2021-01-22 NOTE — PROGRESS NOTES
Admitted/transferred from: Cath lab  Reason for admission/transfer: IABP placement  2 RN skin assessment: completed by Fatuma CRUZ and Choco.   Result of skin assessment and interventions/actions: Old cist site on coccyx area appears darker in color and scarred. Per pt sometimes blisters.   Height, weight, drug calc weight: Done  Patient belongings (see Flowsheet)  MDRO education added to care planN/A    Arrived to floor around 1330. IABP numbers stable. 1:1 setting. R femoral site is WNL. HR is sinus landy w/rates as low as 30, and frequent PVCs and PACs. Pacing patches in place, CSI team aware. Replacing electrolytes. Hypertensive at times. PRN hydralazine given for SBP>160.   ?

## 2021-01-22 NOTE — PROGRESS NOTES
Patient seen and examined. Investigations reviewed. I agree with the plan to proceed with high risk CABG. I discussed the risks and benefits of surgery with patient and daughter including the risks of death, bleeding, stroke, infection, renal failure, arrhythmias and the need for mechanical support. They understand and are willing for us to proceed with surgery.

## 2021-01-22 NOTE — ANESTHESIA PROCEDURE NOTES
Perioperative LEYDA Report  Anesthesia Information  LEYDA probe placed and report generated by:   Ban Hughes MD in the presence of a teaching physician :  Andre Kelley MD    I personally reviewed the images and the fellow/resident interpretation.  I agree with the fellow/resident interpretation as amended by myself. Andre Kelley MD  Images for this study have been archived.           General Procedure Information  Modalities:  2D, 3D, CW Doppler, PW Doppler and Color flow mapping  Diagnostic indications for LEYDA:   Cardiomyopathy, other                          .    Echocardiographic and Doppler Measurements  Right Ventricle:  Cavity size normal.   Diastolic dimension 3 cm.   Hypertrophy not present.   Thrombus not present.    Global function normal.     Left Ventricle:  Cavity size normal.   Hypertrophy not present.   Thrombus not present.   Global Function moderately impaired.   Ejection Fraction 34%.      Ventricular Regional Function:  1- Basal Anteroseptal:  hypokinetic  2- Basal Anterior:  hypokinetic  3- Basal Anterolateral:  hypokinetic  4- Basal Inferolateral:  hypokinetic  5- Basal Inferior:  hypokinetic  6- Basal Inferoseptal:  hypokinetic  7- Mid Anteroseptal:  hypokinetic  8- Mid Anterior:  hypokinetic  9- Mid Anterolateral:  hypokinetic  10- Mid Inferolateral:  hypokinetic  11- Mid Inferior:  hypokinetic  12- Mid Inferoseptal:  hypokinetic  13- Apical Anterior:  hypokinetic  14- Apical Lateral:  hypokinetic  15- Apical Inferior:  hypokinetic  16- Apical Septal:  hypokinetic  17- [place]:  hypokinetic    Valves  Aortic Valve: Annulus normal.  Stenosis not present.  Regurgitation absent.  Leaflets normal.  Leaflet motions normal.    Mitral Valve: Annulus normal.  Stenosis not present.  Regurgitation absent.  Leaflets normal.  Leaflet motions normal.    Tricuspid Valve: Annulus normal.  Stenosis not present.  Regurgitation absent.  Leaflets normal.  Leaflet motions normal.    Pulmonic Valve: Annulus  normal.  Stenosis not present.  Regurgitation absent.      Aorta: Ascending Aorta: Size normal.  Dissection not present.  Plaque thickness less than 3 mm.  Mobile plaque not present.    Aortic Arch: Size normal.   Dissection not present.   Plaque thickness less than 3 mm.   Mobile plaque not present.    Descending Aorta: Size normal.   Dissection not present.   Plaque thickness less than 3 mm.   Mobile plaque not present.        Right Atrium:  Size normal.   Spontaneous echo contrast not present.   Thrombus not present.   Tumor not present.   Device not present.     Left Atrium: Size normal.  Spontaneous echo contrast not present.  Thrombus not present.  Tumor not present.  Device not present.    Left atrial appendage normal.     Atrial Septum: Intra-atrial septal morphology normal.     Ventricular Septum: Intra-ventricular septum morphology normal.     Diastolic Function Measurements:  Diastolic Dysfunction Grade=. E= ms. A= ms. E/A Ratio= 1.2.  DT=  ms.  S/D= .  IVRT= ms.  Other Findings:   Pericardium:  normal.  Pleural Effusion:  none. Pulmonary Arteries:  normal.  Pulmonary Venous Flow:  normal.  [place] sinus catheter present.  Coronary sinus size (mm):  8.5.  .  Post Intervention Findings  Procedure(s) performed:  CABG. Global function:  Unchanged.   Regional wall motion:  Unchanged   Surgeon(s) notified of all postintervention findings:  Yes  .  .  .   .  .  .  .  .  .  .        Echocardiogram Comments

## 2021-01-22 NOTE — ANESTHESIA CARE TRANSFER NOTE
Patient: Jaxon Melendez    Procedure(s):  Median Sternotomy, Takedown of Left Internal Mammary Artery, Endovein [place] of Left Greater Saphenous Vein, Coronary Artery Bypass Grafting x3, On Cardiopulmonary Bypass, Transesophageal Echocardiogram per Anesthesia, Blackwell Catheter Insertion per Urology    Diagnosis: CAD (coronary artery disease) [I25.10]  Diagnosis Additional Information: No value filed.    Anesthesia Type:   General     Note:    Oropharynx: ventilatory support  Level of Consciousness: unresponsive    Level of Supplemental Oxygen: 50  Independent Airway: airway patency not satisfactory and stable  Dentition: dentition unchanged  Vital Signs Stable: post-procedure vital signs reviewed and stable  Report to RN Given: handoff report given  Patient transferred to: ICU  Comments: Transported from OR to ICU with full monitors and ventilation via ambu and IABP in place.  Patient stable throughout transport, placed on ICU vent with EBBS.    ICU Handoff: Call for PAUSE to initiate/utilize ICU HANDOFF, Identified Patient, Identified Responsible Provider, Reviewed the Pertinent Medical History, Discussed Surgical Course, Reviewed Intra-OP Anesthesia Management and Issues during Anesthesia, Set Expectations for Post Procedure Period and Allowed Opportunity for Questions and Acknowledgement of Understanding      Vitals: (Last set prior to Anesthesia Care Transfer)  CRNA VITALS  1/22/2021 1353 - 1/22/2021 1436      1/22/2021             Pulse:  90    ART BP:  (!) 126/31    ART Mean:  68    SpO2:  98 %        Electronically Signed By: LOUIS See CRNA  January 22, 2021  2:36 PM

## 2021-01-22 NOTE — BRIEF OP NOTE
[facility]     Brief Operative Note    Pre-operative diagnosis: CAD (coronary artery disease) [I25.10]  Post-operative diagnosis Same as pre-operative diagnosis    Procedure: Procedure(s):  CORONARY ARTERY BYPASS GRAFT (CABG) x 3 (LIMA to LAD, SVG to diag, OM), LLE endovein harvest  Surgeon: Surgeon(s) and Role:     * Jose Gibbons MD - Primary     * Lupe Suarez MD - Fellow - Assisting  Anesthesia: Combined General with Block   Estimated blood loss: 1000mL   Drains: 36F x 2 mediastinal, 32F angled L pleural  Specimens: * No specimens in log *  Findings:   see op note. Reduced EF to 30% on 0.05epi and 2.4 vaso preop... LVEF was similar post op on similar drips. EF 30-35%.  Complications: None.  Implants: * No implants in log *

## 2021-01-22 NOTE — H&P
"  CV ICU H&P  1/22/2021      CO-MORBIDITIES:   Patient Active Problem List   Diagnosis     CAD (coronary artery disease)     Ischemic cardiomyopathy     CAD, multiple vessel     Diabetes mellitus, type 2 (H)       ASSESSMENT: Jaxon Melendez is a 50 year old male with PMH of HLD, HTN, DM II, tobacco use, heavy alcohol use, ICM with 10-20% EF, CAD s/p PCI to RCA and LCx in 2010, found to have triple vessel disease. Pre-op IABP, intra-op cardioversion x1, uneventful CABG x 3.     PLAN:  Neuro/ pain/ sedation:  # Acute post-operative pain  Heavy alcohol use  - CIWA once sedation weaned   - Fentanyl gtt for pain.  - Precedex/Propofol gtt for sedation.  - gabapentin / hydromorphone / oxycodone / APAP  - hydroxyzine PRN  - native rhythm, not paced, V wires in place.     Pulmonary care:   - MV overnight 1/22, will attempt to wean 01/23 morning   - Titrate FiO2 for SpO2 >92%    Cardiovascular:    ICM, low EF  CAD  HTN  - IABP 1/22 overnight  - Monitor hemodynamic status.   - MAP goal > 65  - titrate NE, epi, vaso     GI /Nutrition:   - NPO  - Bowel regimen with senna BID, miralax daily    Fluids/ Electrolytes/ Renal:   - Padron for strict I&Os  - lactic acidosis, peak at 5.4     Endocrine:    Stress hyperglycemia  DM2  - Insulin gtt    ID/ Antibiotics:  - Perioperative antibiotics with cefazolin    Heme:     # Acute blood loss anemia  Hgb 15.9 --> 14.5  - Hgb goal >7    :  Hypospadias  - padron placed by urology     Prophylaxis:    - SCDs  - Bowel regimen  - PPI  - consider SQH 01/23    Lines/ tubes/ drains:  - CVC  - PAC  - Arterial line  - Padron  - ETT  - OG tube  - Mediastinal tube x2  - Pleural tube x1 (left)    Disposition:  - CV ICU.     Patient seen, findings and plan discussed with CV ICU staff, Dr. Amezquita.    Shai Maynard III, MD   Resident     ====================================    HPI:   \"Patient with history of HLD, HTN, DM II, and CAD s/p PCI to RCA and LCx in 2010. He continued to smoke. He was more " "recently seen with retrosternal chest pain excerbated by exertion, relieved with rest. Troponins were elevated 25-69. He was treated for acute coronary syndrome, and taken to the cath lab where he was found to have severe multi vessel disease including in-stent restenosis. His EF was approximately 10%. The plan includes admission today for placement of IABP and then surgery on the day after.     His history is also significant for hidradenitis suppurativa in the presacral area and regular alcohol use 6-7 drinks daily.\"    Intra-op:   Pre: 20-30% EF  Epinephrine at start  Cardioversion x 1 for SVT  Post: EF 35%   No blood products   Hyperkalemia, treated   Difficult padron placement         PAST MEDICAL HISTORY:   Past Medical History:   Diagnosis Date     Acute systolic heart failure (H)     EF 10%     CAD (coronary artery disease)      Diabetes mellitus, type 2 (H)     uncontrolled     Essential hypertension      Hidradenitis suppurativa     presacral area     Hyperlipidemia LDL goal <100      NSTEMI (non-ST elevated myocardial infarction) (H)        PAST SURGICAL HISTORY:   Past Surgical History:   Procedure Laterality Date     ANGIOGRAM      2010, 2021       FAMILY HISTORY:   Family History   Problem Relation Age of Onset     Brain Tumor Mother      Heart Disease Other      Diabetes Other        SOCIAL HISTORY:   Social History     Tobacco Use     Smoking status: Current Every Day Smoker     Packs/day: 1.00     Years: 25.00     Pack years: 25.00     Types: Cigarettes     Smokeless tobacco: Never Used     Tobacco comment: Now down to 1/2 PPD smoking   Substance Use Topics     Alcohol use: Yes     Comment: 6-7 mixed drinks nightly         OBJECTIVE:   1. VITAL SIGNS:   /73 (BP Location: Right arm, Cuff Size: Adult Regular)   Pulse 60   Temp 98.6  F (37  C) (Pulmonary Artery)   Resp 16   Wt 91.8 kg (202 lb 6.1 oz)   SpO2 96%   BMI 29.04 kg/m         2. INTAKE/ OUTPUT:   I/O last 3 completed shifts:  In: " 1454 [P.O.:760; I.V.:694]  Out: 1375 [Urine:1375]       3. PHYSICAL EXAMINATION:   General: intubated  Neuro: sedated  Resp: ventilated, ETT  CV: RRR, non-paced  Abdomen: Soft, Non-distended  Incisions: c/d/i  Extremities: warm and well perfused    4. INVESTIGATIONS:   Arterial Blood Gases   No lab results found in last 7 days.  Complete Blood Count           5. RADIOLOGY:     =========================================

## 2021-01-22 NOTE — ANESTHESIA PROCEDURE NOTES
LEYDA Probe Insertion Note:    Staff -   Anesthesiologist:  Andre Kelley MD  Resident/Fellow: Ban Hughes MD  Performed By: fellow  Procedure performed by resident/CRNA in presence of a teaching physician.    Probe Number: 11  Probe Status PRE Insertion: NO obvious damage  Probe type:  Adult 3D    Bite block used:   Yes  Insertion Technique: Easy, no oropharyngeal manipulation  Insertion complications: None obvious    Billing Report:LEYDA report by Anesthesiologist (See Separate Report note)    Probe Status POST Removal: NO obvious damage

## 2021-01-22 NOTE — ANESTHESIA PROCEDURE NOTES
Airway   Date/Time: 1/22/2021 8:02 AM   Patient location during procedure: OR  Staff -   CRNA: Nasima Gallagher APRN CRNA  Performed By: CRNA    Consent for Airway   Urgency: elective    Indications and Patient Condition  Indications for airway management: harrison-procedural  Induction type:intravenousMask difficulty assessment: 1 - vent by mask    Final Airway Details  Final airway type: endotracheal airway  Successful airway:ETT - single  Endotracheal Airway Details   ETT size (mm): 8.0  Cuffed: yes  Successful intubation technique: video laryngoscopy  Grade View of Cords: 1  Adjucts: stylet  Measured from: gums/teeth  Secured at (cm): 23  Secured with: silk tape  Bite block used: None    Post intubation assessment   Placement verified by: capnometry, equal breath sounds and chest rise   Number of attempts at approach: 1  Secured with:silk tape  Ease of procedure: easy  Dentition: Intact

## 2021-01-22 NOTE — PLAN OF CARE
Major Shift Events:  Minimal pain overnight. IABP 1:1 100%. Sinus landy. Afebrile. On RA. x2 CHG baths done and hair washed. Voids spontaneously. Pre-op pain medications given.   Plan: CABG @0730.   For vital signs and complete assessments, please see documentation flowsheets.

## 2021-01-22 NOTE — ANESTHESIA POSTPROCEDURE EVALUATION
Patient: Jaxon Melendez    Procedure(s):  Median Sternotomy, Takedown of Left Internal Mammary Artery, Endovein [place] of Left Greater Saphenous Vein, Coronary Artery Bypass Grafting x3, On Cardiopulmonary Bypass, Transesophageal Echocardiogram per Anesthesia, Blackwell Catheter Insertion per Urology    Diagnosis:CAD (coronary artery disease) [I25.10]  Diagnosis Additional Information: No value filed.    Anesthesia Type:  General    Note:  Disposition: ICU            ICU Sign Out: Anesthesiologist/ICU physician sign out WAS performed   Postop Pain Control:    PONV:    Neuro/Psych:             Sign Out: PLANNED postop sedation   Airway/Respiratory:             Sign Out: AIRWAY IN SITU/Resp. Support               Airway in situ/Resp. Support: ETT                 Reason: Planned Pre-op   CV/Hemodynamics:             Sign Out: Acceptable CV status (On minimal doses of Epinephrine and vasopressin/ nor-epinephrine infusion)   Other NRE:    DID A NON-ROUTINE EVENT OCCUR?          Last vitals:  Vitals:    01/22/21 0500 01/22/21 0600 01/22/21 0700   BP:      Pulse: 53 58 60   Resp: 20 16 16   Temp:      SpO2: 96% 97% 96%       Electronically Signed By: Ban Hughes MD  January 22, 2021  2:47 PM

## 2021-01-22 NOTE — PROGRESS NOTES
HISTORY OF PRESENT ILLNESS:  I was requested to se patient for evaluation of his suItability for CABG,Patient with history of HLD, HTN, DM II, and CAD s/p PCI to RCA and LCx in 2010. He continued to smoke. He was more recently seen with retrosternal chest pain excerbated by exertion, relieved with rest. Troponins were elevated 25-69. He was treated for acute coronary syndrome, and taken to the cath lab where he was found to have severe multi vessel disease including in-stent restenosis. His EF was approximately 10%.      His history is also significant for hidradenitis suppurativa in the presacral area and regular alcohol use 6-7 drinks daily.      PAST MEDICAL HISTORY:  Reviewed with patient on 01/21/2021        Past Medical History:   Diagnosis Date     Acute systolic heart failure (H)       EF 10%     CAD (coronary artery disease)       Diabetes mellitus, type 2 (H)       uncontrolled     Essential hypertension       Hidradenitis suppurativa       presacral area     Hyperlipidemia LDL goal <100       NSTEMI (non-ST elevated myocardial infarction) (H)                 Past Surgical History:   Procedure Laterality Date     ANGIOGRAM         2010, 2021         MEDICATIONS:  PTA Meds         Prior to Admission medications    Medication Sig Last Dose Taking? Auth Provider   Acetaminophen (TYLENOL) 325 MG CAPS Take 325-650 mg by mouth every 4 hours as needed Past Month at Unknown time Yes Reported, Patient   aspirin 81 MG EC tablet Take 81 mg by mouth every morning  1/20/2021 at 0800 Yes Reported, Patient   atorvastatin (LIPITOR) 40 MG tablet Take 40 mg by mouth every morning  1/20/2021 at 0800 Yes Reported, Patient   insulin glargine (LANTUS PEN) 100 UNIT/ML pen Inject 25 Units Subcutaneous every evening  1/20/2021 at 1800 Yes Reported, Patient   insulin lispro (HUMALOG KWIKPEN) 100 UNIT/ML (1 unit dial) KWIKPEN Inject 10 Units Subcutaneous 3 times daily (before meals) 1/20/2021 at 1800 Yes Reported, Patient    lisinopril (ZESTRIL) 5 MG tablet Take 10 mg by mouth every morning  1/20/2021 at 0800 Yes Reported, Patient   metFORMIN (GLUCOPHAGE) 500 MG tablet Take 500 mg by mouth daily (with breakfast) 1/20/2021 at 0800 Yes Reported, Patient   metoprolol succinate ER (TOPROL-XL) 25 MG 24 hr tablet Take 50 mg by mouth every morning  1/20/2021 at 0800 Yes Reported, Patient   clopidogrel (PLAVIX) 75 MG tablet Take 75 mg by mouth every morning Pt states he is not taking this medication.  CTRN  1/21/21 Unknown at Unknown time   Reported, Patient   dulaglutide (TRULICITY) 0.75 MG/0.5ML pen Inject 0.75 mg Subcutaneous every 7 days Pt is unaware of this med.  He is not taking. Unknown at Unknown time   Reported, Patient   nitroGLYcerin (NITROSTAT) 0.4 MG sublingual tablet Place 0.4 mg under the tongue every 5 minutes as needed for chest pain For chest pain place 1 tablet under the tongue every 5 minutes for 3 doses. If symptoms persist 5 minutes after 1st dose call 911. Unknown at Unknown time   Reported, Patient      Current Meds    [START ON 1/22/2021] aspirin  81 mg Oral QAM     [START ON 1/22/2021] atorvastatin  40 mg Oral QAM     atropine           heparin ANTICOAGULANT  5,000 Units Subcutaneous Q12H     insulin aspart  1-7 Units Subcutaneous TID AC     insulin aspart  1-5 Units Subcutaneous At Bedtime     insulin glargine  10 Units Subcutaneous At Bedtime     [START ON 1/22/2021] metoprolol succinate ER  50 mg Oral QAM     sodium chloride (PF)  3 mL Intracatheter Q8H      Infusion Meds    - MEDICATION INSTRUCTIONS -       Reason anticoagulation order not selected           ALLERGIES:    No Known Allergies     REVIEW OF SYSTEMS:  A 10 point review of systems was negative other than mentioned in history and physical exam.     SOCIAL HISTORY:   Social History            Socioeconomic History     Marital status:        Spouse name: Not on file     Number of children: Not on file     Years of education: Not on file      Highest education level: Not on file   Occupational History     Not on file   Social Needs     Financial resource strain: Not on file     Food insecurity       Worry: Not on file       Inability: Not on file     Transportation needs       Medical: Not on file       Non-medical: Not on file   Tobacco Use     Smoking status: Current Every Day Smoker       Packs/day: 1.00       Years: 25.00       Pack years: 25.00       Types: Cigarettes     Smokeless tobacco: Never Used     Tobacco comment: Now down to 1/2 PPD smoking   Substance and Sexual Activity     Alcohol use: Yes       Comment: 6-7 mixed drinks nightly     Drug use: Never     Sexual activity: Not on file   Lifestyle     Physical activity       Days per week: Not on file       Minutes per session: Not on file     Stress: Not on file   Relationships     Social connections       Talks on phone: Not on file       Gets together: Not on file       Attends Jew service: Not on file       Active member of club or organization: Not on file       Attends meetings of clubs or organizations: Not on file       Relationship status: Not on file     Intimate partner violence       Fear of current or ex partner: Not on file       Emotionally abused: Not on file       Physically abused: Not on file       Forced sexual activity: Not on file   Other Topics Concern     Not on file   Social History Narrative     Not on file            FAMILY MEDICAL HISTORY:         Family History   Problem Relation Age of Onset     Brain Tumor Mother       Heart Disease Other       Diabetes Other              PHYSICAL EXAM:   Temp  Av.3  F (36.8  C)  Min: 98.1  F (36.7  C)  Max: 98.4  F (36.9  C)      Pulse  Av.5  Min: 50  Max: 59 Resp  Av  Min: 12  Max: 16  SpO2  Av %  Min: 96 %  Max: 98 %       /89 (BP Location: Right arm)   Pulse (!) 47   Resp 16   SpO2 97%       GENERAL APPEARANCE: Alert and NAD  Head: NC/AT  NECK: Supple, no goiter  Pulmonary: Lungs clear to  auscultation with equal breath sounds bilaterally, no clubbing or cyanosis  CV: Sinus bradycardia , normal rate, no rub.   GI: Soft, nontender, normal bowel sounds, no HSM   SKIN: No rash, warm, dry  NEURO: Mentation intact and speech normal.      LABS:   CMP      Recent Labs   Lab 01/19/21  1416      POTASSIUM 3.4   CHLORIDE 106   CO2 30   ANIONGAP 4   *   BUN 8   CR 0.65*   GFRESTIMATED >90   GFRESTBLACK >90   RICHARD 8.9   PROTTOTAL 7.2   ALBUMIN 3.2*   BILITOTAL 0.7   ALKPHOS 100   AST 8   ALT 14      CBC      Recent Labs   Lab 01/19/21  1416   HGB 16.4   WBC 10.9   RBC 5.30   HCT 51.1   MCV 96   MCH 30.9   MCHC 32.1   RDW 12.0         INR      Recent Labs   Lab 01/19/21  1416   INR 1.22*   PTT 30      ABGNo lab results found in last 7 days.      IMAGING:     CT chest without contrast     1. Mild cardiomegaly with left ventricular enlargement and evidence of  prior myocardial infarction/s. Severe coronary artery calcifications.  2. No acute airspace disease.     EKG  Sinus bradycardia, normal axis, poor R wave progression          ASSESSMENT AND PLAN:   50  Year old male with history of HLD, HTN, DM II, smoking, and CAD s/p PCI to RCA and LCx in 2010, now with severe CAD with severe LV dysfunction. Patient was referred to me by Dr Houston for high risk CABG. On close review of his MRI, there is adequate viability of LAD and circumflex territory to support doing CABG. I discussed the risks and benefits of CABG with patient and daughter including risks of death, bleeding, stroke, infection, renal failure, arrhythmias and the need for mechanical circulatory support. They understand and are willing for me to proceed with surgery. Will admit to ICU for swan and IABP prior to surgery.    I spent a total of 65 minutes which included patient visit, review of outside tests and imaging and interpretation of tests, discussion with referral cardiologist and documentation.

## 2021-01-22 NOTE — PROGRESS NOTES
"CLINICAL NUTRITION SERVICES - ASSESSMENT NOTE     Nutrition Prescription    Recommendations:  Advance to regular diet post-op as appropriate     Malnutrition Status:    Unable to determine as unable to assess all nutrition parameters, pt in OR    Future Recommendations:  - Upon diet advancement post-op, RD to order snacks/supplements per pt preference. Additionally, start thiamine (100mg daily) for reduced EF%   - RD to provide post-CABG diet education as able/appropriate.  - If enteral nutrition indicated this admit: Impact Peptide @ goal 60 ml/hr (1440 ml/day) to provide 2160 kcals (27 kcal/kg/day), 135 g PRO (1.7 g/kg/day), 1109 ml free H2O, 92 g Fat (50% from MCTs), 202 g CHO and no Fiber daily.       REASON FOR ASSESSMENT  Jaxon Melendez is a 50 year old male assessed by the dietitian for MST score >2.    NUTRITION HISTORY  Per MST, pt reported reduced intake secondary to poor appetite with subsequent unintentional weight loss. Unable to obtain further nutrition history today, pt currently in OR. Of note, pt has a history of ETOH dependence (6 beers/day) with potential for chronic displacement of adequate nutrition with alcohol consumption.    CURRENT NUTRITION ORDERS  Diet: NPO (< 24 hrs)   Intake: Prior to OR, pt was on a regular diet. Based on nursing documentation, ate 75% meal pre-op.    LABS  Labs reviewed    MEDICATIONS  Medications reviewed    ANTHROPOMETRICS  Height:   Ht Readings from Last 2 Encounters:   01/21/21 1.778 m (5' 10\")   01/20/21 1.778 m (5' 10\")   Most Recent Weight: 91.8 kg (202 lb 6.1 oz)    IBW: 75.5 kg  BMI: Overweight BMI 25-29.9  Weight History:   Wt Readings from Last 10 Encounters:   01/22/21 91.8 kg (202 lb 6.1 oz)   01/21/21 92.1 kg (203 lb)   01/20/21 92.1 kg (203 lb)   01/19/21 92.5 kg (204 lb)   01/06/21 83.9 kg (185 lb)       Dosing Weight: 80 kg (adjusted for overweight status, based on IBW of 75.5 kg and current weight of 91.8 kg on 1/22)     ASSESSED NUTRITION " NEEDS  Estimated Energy Needs: 2000 - 2400 kcals/day (25 - 30 kcals/kg)  Justification: Maintenance  Estimated Protein Needs: 95 - 120+ grams protein/day (1.2 - 1.5+ grams of pro/kg)  Justification: Post-op CABG   Estimated Fluid Needs: 2000 - 2400 mL/day (1 mL/kcal)   Justification: Maintenance    PHYSICAL FINDINGS  See malnutrition section below.    MALNUTRITION  % Intake: Unable to assess  % Weight Loss: Unable to assess  Subcutaneous Fat Loss: Unable to assess  Muscle Loss: Unable to assess  Fluid Accumulation/Edema: Unable to assess  Malnutrition Diagnosis: Unable to determine as unable to assess all nutrition parameters, pt in OR    NUTRITION DIAGNOSIS  Inadequate oral intake related to NPO for procedure as evidenced by meeting 0% nutrition needs for >1 day      INTERVENTIONS  Implementation  None at this time. See future recommendations. Anticipate consult for post-CABG diet education (RD to provide as able/appropriate, written education added to discharge instructions).     Goals  Diet adv vs nutrition support within 2-3 days.     Monitoring/Evaluation  Progress toward goals will be monitored and evaluated per protocol.    Orquidea Jasmine RD, LD  [id number]  Pgr: [phone]

## 2021-01-22 NOTE — ANESTHESIA PROCEDURE NOTES
Arterial Line Procedure Note    Staff -   Anesthesiologist:  Andre Kelley MD  Resident/Fellow: Ban Hughes MD  Performed By: fellow  Location: In OR After Induction  Procedure Start/Stop Times:     patient identified, IV checked, site marked, risks and benefits discussed, informed consent, monitors and equipment checked, pre-op evaluation and at physician/surgeon's request      Correct Patient: Yes      Correct Position: Yes      Correct Site: Yes      Correct Procedure: Yes      Correct Laterality:  Yes    Site Marked:  Yes  Line Placement:     Procedure:  Arterial Line    Insertion Site:  Radial    Insertion laterality:  Right    Skin Prep: Chloraprep      Patient Prep: patient draped, mask, sterile gloves and hat      Local skin infiltration:  None    Ultrasound Guided?: Yes      Artery evaluated via ultrasound confirming patency.   Using realtime imaging, the artery was punctured and the needle was observed entering the artery.      A permanent image is NOT entered into the patient's record.      Catheter size:  20 gauge, 12 cm    Cath secured with: suture      Dressing:  Tegaderm    Complications:  None obvious    Arterial waveform: Yes      IBP within 10% of NIBP: Yes

## 2021-01-23 ENCOUNTER — APPOINTMENT (OUTPATIENT)
Dept: GENERAL RADIOLOGY | Facility: CLINIC | Age: 51
DRG: 235 | End: 2021-01-23
Attending: STUDENT IN AN ORGANIZED HEALTH CARE EDUCATION/TRAINING PROGRAM
Payer: COMMERCIAL

## 2021-01-23 LAB
ALBUMIN SERPL-MCNC: 3.1 G/DL (ref 3.4–5)
ALP SERPL-CCNC: 62 U/L (ref 40–150)
ALT SERPL W P-5'-P-CCNC: 10 U/L (ref 0–70)
ANION GAP SERPL CALCULATED.3IONS-SCNC: 6 MMOL/L (ref 3–14)
AST SERPL W P-5'-P-CCNC: 21 U/L (ref 0–45)
BASE DEFICIT BLDA-SCNC: 1 MMOL/L
BASE EXCESS BLDA CALC-SCNC: 0.9 MMOL/L
BASE EXCESS BLDA CALC-SCNC: 3.5 MMOL/L
BASE EXCESS BLDV CALC-SCNC: 2.1 MMOL/L
BASE EXCESS BLDV CALC-SCNC: 4.2 MMOL/L
BASE EXCESS BLDV CALC-SCNC: 4.6 MMOL/L
BILIRUB SERPL-MCNC: 0.9 MG/DL (ref 0.2–1.3)
BLD PROD TYP BPU: NORMAL
BLD UNIT ID BPU: 0
BLOOD PRODUCT CODE: NORMAL
BPU ID: NORMAL
BUN SERPL-MCNC: 11 MG/DL (ref 7–30)
CA-I BLD-MCNC: 4.7 MG/DL (ref 4.4–5.2)
CALCIUM SERPL-MCNC: 8.8 MG/DL (ref 8.5–10.1)
CHLORIDE SERPL-SCNC: 111 MMOL/L (ref 94–109)
CO2 SERPL-SCNC: 25 MMOL/L (ref 20–32)
CREAT SERPL-MCNC: 0.75 MG/DL (ref 0.66–1.25)
ERYTHROCYTE [DISTWIDTH] IN BLOOD BY AUTOMATED COUNT: 12.5 % (ref 10–15)
GFR SERPL CREATININE-BSD FRML MDRD: >90 ML/MIN/{1.73_M2}
GLUCOSE BLDC GLUCOMTR-MCNC: 105 MG/DL (ref 70–99)
GLUCOSE BLDC GLUCOMTR-MCNC: 110 MG/DL (ref 70–99)
GLUCOSE BLDC GLUCOMTR-MCNC: 114 MG/DL (ref 70–99)
GLUCOSE BLDC GLUCOMTR-MCNC: 132 MG/DL (ref 70–99)
GLUCOSE BLDC GLUCOMTR-MCNC: 133 MG/DL (ref 70–99)
GLUCOSE BLDC GLUCOMTR-MCNC: 133 MG/DL (ref 70–99)
GLUCOSE BLDC GLUCOMTR-MCNC: 138 MG/DL (ref 70–99)
GLUCOSE BLDC GLUCOMTR-MCNC: 143 MG/DL (ref 70–99)
GLUCOSE BLDC GLUCOMTR-MCNC: 144 MG/DL (ref 70–99)
GLUCOSE BLDC GLUCOMTR-MCNC: 150 MG/DL (ref 70–99)
GLUCOSE BLDC GLUCOMTR-MCNC: 150 MG/DL (ref 70–99)
GLUCOSE BLDC GLUCOMTR-MCNC: 156 MG/DL (ref 70–99)
GLUCOSE BLDC GLUCOMTR-MCNC: 156 MG/DL (ref 70–99)
GLUCOSE BLDC GLUCOMTR-MCNC: 168 MG/DL (ref 70–99)
GLUCOSE BLDC GLUCOMTR-MCNC: 186 MG/DL (ref 70–99)
GLUCOSE SERPL-MCNC: 117 MG/DL (ref 70–99)
HCO3 BLD-SCNC: 25 MMOL/L (ref 21–28)
HCO3 BLD-SCNC: 26 MMOL/L (ref 21–28)
HCO3 BLD-SCNC: 27 MMOL/L (ref 21–28)
HCO3 BLDV-SCNC: 28 MMOL/L (ref 21–28)
HCO3 BLDV-SCNC: 29 MMOL/L (ref 21–28)
HCO3 BLDV-SCNC: 30 MMOL/L (ref 21–28)
HCT VFR BLD AUTO: 34.9 % (ref 40–53)
HGB BLD-MCNC: 11.4 G/DL (ref 13.3–17.7)
LACTATE BLD-SCNC: 1.1 MMOL/L (ref 0.7–2)
LACTATE BLD-SCNC: 1.1 MMOL/L (ref 0.7–2)
MAGNESIUM SERPL-MCNC: 2.1 MG/DL (ref 1.6–2.3)
MAGNESIUM SERPL-MCNC: 2.2 MG/DL (ref 1.6–2.3)
MAGNESIUM SERPL-MCNC: 2.3 MG/DL (ref 1.6–2.3)
MCH RBC QN AUTO: 31.6 PG (ref 26.5–33)
MCHC RBC AUTO-ENTMCNC: 32.7 G/DL (ref 31.5–36.5)
MCV RBC AUTO: 97 FL (ref 78–100)
O2/TOTAL GAS SETTING VFR VENT: 40 %
O2/TOTAL GAS SETTING VFR VENT: ABNORMAL %
O2/TOTAL GAS SETTING VFR VENT: ABNORMAL %
OXYHGB MFR BLD: 95 % (ref 92–100)
OXYHGB MFR BLD: 95 % (ref 92–100)
OXYHGB MFR BLD: 97 % (ref 92–100)
OXYHGB MFR BLDV: 63 %
OXYHGB MFR BLDV: 68 %
OXYHGB MFR BLDV: 70 %
PCO2 BLD: 35 MM HG (ref 35–45)
PCO2 BLD: 42 MM HG (ref 35–45)
PCO2 BLD: 47 MM HG (ref 35–45)
PCO2 BLDV: 39 MM HG (ref 40–50)
PCO2 BLDV: 40 MM HG (ref 40–50)
PCO2 BLDV: 59 MM HG (ref 40–50)
PH BLD: 7.34 PH (ref 7.35–7.45)
PH BLD: 7.4 PH (ref 7.35–7.45)
PH BLD: 7.49 PH (ref 7.35–7.45)
PH BLDV: 7.31 PH (ref 7.32–7.43)
PH BLDV: 7.46 PH (ref 7.32–7.43)
PH BLDV: 7.47 PH (ref 7.32–7.43)
PHOSPHATE SERPL-MCNC: 3.1 MG/DL (ref 2.5–4.5)
PHOSPHATE SERPL-MCNC: 3.8 MG/DL (ref 2.5–4.5)
PHOSPHATE SERPL-MCNC: 4.6 MG/DL (ref 2.5–4.5)
PLATELET # BLD AUTO: 134 10E9/L (ref 150–450)
PO2 BLD: 80 MM HG (ref 80–105)
PO2 BLD: 91 MM HG (ref 80–105)
PO2 BLD: 95 MM HG (ref 80–105)
PO2 BLDV: 33 MM HG (ref 25–47)
PO2 BLDV: 34 MM HG (ref 25–47)
PO2 BLDV: 37 MM HG (ref 25–47)
POTASSIUM SERPL-SCNC: 4.5 MMOL/L (ref 3.4–5.3)
POTASSIUM SERPL-SCNC: 4.5 MMOL/L (ref 3.4–5.3)
PROT SERPL-MCNC: 5.9 G/DL (ref 6.8–8.8)
RBC # BLD AUTO: 3.61 10E12/L (ref 4.4–5.9)
SODIUM SERPL-SCNC: 142 MMOL/L (ref 133–144)
TRANSFUSION STATUS PATIENT QL: NORMAL
WBC # BLD AUTO: 26.3 10E9/L (ref 4–11)

## 2021-01-23 PROCEDURE — 83605 ASSAY OF LACTIC ACID: CPT | Performed by: STUDENT IN AN ORGANIZED HEALTH CARE EDUCATION/TRAINING PROGRAM

## 2021-01-23 PROCEDURE — 250N000011 HC RX IP 250 OP 636: Performed by: STUDENT IN AN ORGANIZED HEALTH CARE EDUCATION/TRAINING PROGRAM

## 2021-01-23 PROCEDURE — 82805 BLOOD GASES W/O2 SATURATION: CPT | Performed by: SURGERY

## 2021-01-23 PROCEDURE — 93005 ELECTROCARDIOGRAM TRACING: CPT

## 2021-01-23 PROCEDURE — 250N000013 HC RX MED GY IP 250 OP 250 PS 637: Performed by: SURGERY

## 2021-01-23 PROCEDURE — 84100 ASSAY OF PHOSPHORUS: CPT | Performed by: STUDENT IN AN ORGANIZED HEALTH CARE EDUCATION/TRAINING PROGRAM

## 2021-01-23 PROCEDURE — 71045 X-RAY EXAM CHEST 1 VIEW: CPT

## 2021-01-23 PROCEDURE — 250N000009 HC RX 250: Performed by: STUDENT IN AN ORGANIZED HEALTH CARE EDUCATION/TRAINING PROGRAM

## 2021-01-23 PROCEDURE — 84100 ASSAY OF PHOSPHORUS: CPT | Performed by: SURGERY

## 2021-01-23 PROCEDURE — 999N000075 HC STATISTIC IABP MONITORING

## 2021-01-23 PROCEDURE — 999N001017 HC STATISTIC GLUCOSE BY METER IP

## 2021-01-23 PROCEDURE — 83735 ASSAY OF MAGNESIUM: CPT | Performed by: STUDENT IN AN ORGANIZED HEALTH CARE EDUCATION/TRAINING PROGRAM

## 2021-01-23 PROCEDURE — 99233 SBSQ HOSP IP/OBS HIGH 50: CPT | Mod: GC | Performed by: INTERNAL MEDICINE

## 2021-01-23 PROCEDURE — 71045 X-RAY EXAM CHEST 1 VIEW: CPT | Mod: 26 | Performed by: RADIOLOGY

## 2021-01-23 PROCEDURE — 93010 ELECTROCARDIOGRAM REPORT: CPT | Performed by: INTERNAL MEDICINE

## 2021-01-23 PROCEDURE — 80053 COMPREHEN METABOLIC PANEL: CPT | Performed by: STUDENT IN AN ORGANIZED HEALTH CARE EDUCATION/TRAINING PROGRAM

## 2021-01-23 PROCEDURE — 82805 BLOOD GASES W/O2 SATURATION: CPT | Performed by: STUDENT IN AN ORGANIZED HEALTH CARE EDUCATION/TRAINING PROGRAM

## 2021-01-23 PROCEDURE — 258N000003 HC RX IP 258 OP 636: Performed by: STUDENT IN AN ORGANIZED HEALTH CARE EDUCATION/TRAINING PROGRAM

## 2021-01-23 PROCEDURE — 85027 COMPLETE CBC AUTOMATED: CPT | Performed by: STUDENT IN AN ORGANIZED HEALTH CARE EDUCATION/TRAINING PROGRAM

## 2021-01-23 PROCEDURE — 999N000015 HC STATISTIC ARTERIAL MONITORING DAILY

## 2021-01-23 PROCEDURE — 250N000013 HC RX MED GY IP 250 OP 250 PS 637: Performed by: NURSE PRACTITIONER

## 2021-01-23 PROCEDURE — 999N000155 HC STATISTIC RAPCV CVP MONITORING

## 2021-01-23 PROCEDURE — 999N000157 HC STATISTIC RCP TIME EA 10 MIN

## 2021-01-23 PROCEDURE — 83735 ASSAY OF MAGNESIUM: CPT | Performed by: SURGERY

## 2021-01-23 PROCEDURE — 82330 ASSAY OF CALCIUM: CPT | Performed by: STUDENT IN AN ORGANIZED HEALTH CARE EDUCATION/TRAINING PROGRAM

## 2021-01-23 PROCEDURE — 200N000002 HC R&B ICU UMMC

## 2021-01-23 PROCEDURE — 84132 ASSAY OF SERUM POTASSIUM: CPT | Performed by: SURGERY

## 2021-01-23 PROCEDURE — 999N000045 HC STATISTIC DAILY SWAN MONITORING

## 2021-01-23 PROCEDURE — 250N000011 HC RX IP 250 OP 636: Performed by: SURGERY

## 2021-01-23 PROCEDURE — 250N000013 HC RX MED GY IP 250 OP 250 PS 637: Performed by: STUDENT IN AN ORGANIZED HEALTH CARE EDUCATION/TRAINING PROGRAM

## 2021-01-23 PROCEDURE — 94003 VENT MGMT INPAT SUBQ DAY: CPT

## 2021-01-23 RX ORDER — FLUMAZENIL 0.1 MG/ML
0.2 INJECTION, SOLUTION INTRAVENOUS
Status: DISCONTINUED | OUTPATIENT
Start: 2021-01-23 | End: 2021-01-26

## 2021-01-23 RX ORDER — FOLIC ACID 5 MG/ML
1 INJECTION, SOLUTION INTRAMUSCULAR; INTRAVENOUS; SUBCUTANEOUS DAILY
Status: COMPLETED | OUTPATIENT
Start: 2021-01-24 | End: 2021-01-25

## 2021-01-23 RX ORDER — CLONIDINE HYDROCHLORIDE 0.1 MG/1
0.1 TABLET ORAL EVERY 8 HOURS
Status: DISCONTINUED | OUTPATIENT
Start: 2021-01-23 | End: 2021-01-23

## 2021-01-23 RX ORDER — HALOPERIDOL 5 MG/ML
2.5-5 INJECTION INTRAMUSCULAR EVERY 4 HOURS PRN
Status: DISCONTINUED | OUTPATIENT
Start: 2021-01-23 | End: 2021-01-23

## 2021-01-23 RX ORDER — LORAZEPAM 2 MG/ML
1-2 INJECTION INTRAMUSCULAR EVERY 30 MIN PRN
Status: DISCONTINUED | OUTPATIENT
Start: 2021-01-23 | End: 2021-01-26

## 2021-01-23 RX ORDER — FOLIC ACID 5 MG/ML
1 INJECTION, SOLUTION INTRAMUSCULAR; INTRAVENOUS; SUBCUTANEOUS ONCE
Status: COMPLETED | OUTPATIENT
Start: 2021-01-23 | End: 2021-01-23

## 2021-01-23 RX ORDER — LANOLIN ALCOHOL/MO/W.PET/CERES
100 CREAM (GRAM) TOPICAL DAILY
Status: DISCONTINUED | OUTPATIENT
Start: 2021-01-31 | End: 2021-01-24

## 2021-01-23 RX ORDER — LANOLIN ALCOHOL/MO/W.PET/CERES
100 CREAM (GRAM) TOPICAL 3 TIMES DAILY
Status: DISCONTINUED | OUTPATIENT
Start: 2021-01-25 | End: 2021-01-24

## 2021-01-23 RX ORDER — HEPARIN SODIUM 5000 [USP'U]/.5ML
5000 INJECTION, SOLUTION INTRAVENOUS; SUBCUTANEOUS EVERY 8 HOURS
Status: DISCONTINUED | OUTPATIENT
Start: 2021-01-23 | End: 2021-01-27

## 2021-01-23 RX ORDER — METOPROLOL TARTRATE 1 MG/ML
5 INJECTION, SOLUTION INTRAVENOUS EVERY 6 HOURS PRN
Status: DISCONTINUED | OUTPATIENT
Start: 2021-01-23 | End: 2021-01-23

## 2021-01-23 RX ORDER — LORAZEPAM 0.5 MG/1
1-2 TABLET ORAL EVERY 30 MIN PRN
Status: DISCONTINUED | OUTPATIENT
Start: 2021-01-23 | End: 2021-01-26

## 2021-01-23 RX ORDER — FUROSEMIDE 10 MG/ML
40 INJECTION INTRAMUSCULAR; INTRAVENOUS ONCE
Status: COMPLETED | OUTPATIENT
Start: 2021-01-24 | End: 2021-01-23

## 2021-01-23 RX ORDER — FOLIC ACID 1 MG/1
1 TABLET ORAL DAILY
Status: DISCONTINUED | OUTPATIENT
Start: 2021-01-26 | End: 2021-01-24

## 2021-01-23 RX ADMIN — CEFAZOLIN SODIUM 2 G: 2 INJECTION, SOLUTION INTRAVENOUS at 13:18

## 2021-01-23 RX ADMIN — PANTOPRAZOLE SODIUM 40 MG: 40 TABLET, DELAYED RELEASE ORAL at 08:24

## 2021-01-23 RX ADMIN — ATORVASTATIN CALCIUM 40 MG: 40 TABLET, FILM COATED ORAL at 08:24

## 2021-01-23 RX ADMIN — FOLIC ACID 1 MG: 5 INJECTION, SOLUTION INTRAMUSCULAR; INTRAVENOUS; SUBCUTANEOUS at 17:14

## 2021-01-23 RX ADMIN — HUMAN INSULIN 3 UNITS/HR: 100 INJECTION, SOLUTION SUBCUTANEOUS at 22:00

## 2021-01-23 RX ADMIN — ACETAMINOPHEN 975 MG: 325 TABLET, FILM COATED ORAL at 22:00

## 2021-01-23 RX ADMIN — METHOCARBAMOL 750 MG: 750 TABLET, FILM COATED ORAL at 20:11

## 2021-01-23 RX ADMIN — HEPARIN SODIUM 5000 UNITS: 5000 INJECTION, SOLUTION INTRAVENOUS; SUBCUTANEOUS at 12:20

## 2021-01-23 RX ADMIN — THIAMINE HYDROCHLORIDE 200 MG: 100 INJECTION, SOLUTION INTRAMUSCULAR; INTRAVENOUS at 20:11

## 2021-01-23 RX ADMIN — GABAPENTIN 300 MG: 300 CAPSULE ORAL at 08:25

## 2021-01-23 RX ADMIN — Medication 2 UNITS/HR: at 10:27

## 2021-01-23 RX ADMIN — POTASSIUM CHLORIDE 20 MEQ: 29.8 INJECTION, SOLUTION INTRAVENOUS at 01:18

## 2021-01-23 RX ADMIN — OXYCODONE HYDROCHLORIDE 10 MG: 5 TABLET ORAL at 17:09

## 2021-01-23 RX ADMIN — SENNOSIDES AND DOCUSATE SODIUM 2 TABLET: 8.6; 5 TABLET ORAL at 08:26

## 2021-01-23 RX ADMIN — EPINEPHRINE 0.03 MCG/KG/MIN: 1 INJECTION PARENTERAL at 21:20

## 2021-01-23 RX ADMIN — FUROSEMIDE 40 MG: 10 INJECTION, SOLUTION INTRAVENOUS at 23:52

## 2021-01-23 RX ADMIN — MUPIROCIN 0.5 G: 20 OINTMENT TOPICAL at 08:24

## 2021-01-23 RX ADMIN — CEFAZOLIN SODIUM 2 G: 2 INJECTION, SOLUTION INTRAVENOUS at 05:00

## 2021-01-23 RX ADMIN — HYDROMORPHONE HYDROCHLORIDE 0.5 MG: 1 INJECTION, SOLUTION INTRAMUSCULAR; INTRAVENOUS; SUBCUTANEOUS at 21:10

## 2021-01-23 RX ADMIN — HEPARIN SODIUM 5000 UNITS: 5000 INJECTION, SOLUTION INTRAVENOUS; SUBCUTANEOUS at 20:11

## 2021-01-23 RX ADMIN — OXYCODONE HYDROCHLORIDE 10 MG: 5 TABLET ORAL at 20:08

## 2021-01-23 RX ADMIN — PROPOFOL 40 MCG/KG/MIN: 10 INJECTION, EMULSION INTRAVENOUS at 05:34

## 2021-01-23 RX ADMIN — HYDROMORPHONE HYDROCHLORIDE 0.5 MG: 1 INJECTION, SOLUTION INTRAMUSCULAR; INTRAVENOUS; SUBCUTANEOUS at 19:08

## 2021-01-23 RX ADMIN — PROPOFOL 35 MCG/KG/MIN: 10 INJECTION, EMULSION INTRAVENOUS at 10:27

## 2021-01-23 RX ADMIN — ASPIRIN 325 MG ORAL TABLET 325 MG: 325 PILL ORAL at 08:24

## 2021-01-23 RX ADMIN — ACETAMINOPHEN 975 MG: 325 TABLET, FILM COATED ORAL at 05:53

## 2021-01-23 RX ADMIN — HYDROMORPHONE HYDROCHLORIDE 0.5 MG: 1 INJECTION, SOLUTION INTRAMUSCULAR; INTRAVENOUS; SUBCUTANEOUS at 16:22

## 2021-01-23 RX ADMIN — HUMAN INSULIN 3 UNITS/HR: 100 INJECTION, SOLUTION SUBCUTANEOUS at 08:58

## 2021-01-23 RX ADMIN — ACETAMINOPHEN 975 MG: 325 TABLET, FILM COATED ORAL at 14:03

## 2021-01-23 RX ADMIN — MUPIROCIN 0.5 G: 20 OINTMENT TOPICAL at 20:11

## 2021-01-23 RX ADMIN — DOCUSATE SODIUM AND SENNOSIDES 1 TABLET: 8.6; 5 TABLET, FILM COATED ORAL at 20:11

## 2021-01-23 RX ADMIN — GABAPENTIN 300 MG: 300 CAPSULE ORAL at 20:11

## 2021-01-23 RX ADMIN — OXYCODONE HYDROCHLORIDE 10 MG: 5 TABLET ORAL at 23:09

## 2021-01-23 RX ADMIN — OXYCODONE HYDROCHLORIDE 10 MG: 5 TABLET ORAL at 14:14

## 2021-01-23 ASSESSMENT — ACTIVITIES OF DAILY LIVING (ADL)
ADLS_ACUITY_SCORE: 17

## 2021-01-23 NOTE — OP NOTE
Procedure Date: 01/22/2021      PREOPERATIVE DIAGNOSES:   1.  Coronary artery disease.   2.  Severe left ventricular dysfunction.   3.  Diabetes mellitus.      POSTOPERATIVE DIAGNOSES:   1.  Coronary artery disease.   2.  Severe left ventricular dysfunction.   3.  Diabetes mellitus.      PROCEDURES:   1.  Coronary artery bypass grafting x3 (left internal mammary artery, left anterior descending artery, saphenous vein graft to first diagonal artery, saphenous vein graft to obtuse marginal artery).   2.  Endoscopic vein harvest.      SURGEON:  Jose Gibbons MD      ASSISTANT:  Lupe Mcmahon MD; Carmen Espino PA-C.      OPERATIVE INDICATIONS:  The patient is a 50-year-old diabetic with severe triple-vessel coronary artery disease.  Decision was made to proceed with coronary bypass grafting.  I discussed risks and benefits with the patient and family, including the risk of death, stroke, bleeding wound,renal failure, arrhythmias and infection.  They decided to proceed with surgery.      OPERATIVE FINDINGS:  The patient's sternum was of adequate quality.  Pericardial space is free of any adhesions.  Vessels bypassed included the mid left anterior descending artery 2 mm in diameter with proximal stenosis, the first diagonal artery was 2 mm in diameter with proximal stenosis, and the obtuse marginal artery was 2.25 mm in diameter.      DESCRIPTION OF OPERATION:  The patient was brought to operating room in stable condition for the emergent general anesthesia.  The patient's chest, abdomen and lower extremities prepped and draped in the usual manner.  A long segment of saphenous vein was harvested from the left lower extremity from the left greater saphenous vein, using endoscopic vein harvest techniques.  Simultaneously, a median sternotomy was performed.  The left internal mammary artery was harvested from its bed and dilated with topical papaverine.  Preparation of cardiopulmonary bypass included ACT-guided  heparinization and the admission of [facility].  Aorta was cannulated with a 20-Malian arterial cannula through atretic pursestring pledgeted suture x2.  Dual stage venous cannula was inserted in the right atrium.  Antegrade and retrograde cardioplegic cannula were placed.  Full cardiopulmonary bypass was initiated.  Aortic pressure was temporarily reduced and the aorta was cross-clamped.  One liter of cold blood cardioplegia administered with antegrade and retrograde routes.  Subsequent dose of cardioplegia given at no greater than 20 minute intervals, via the retrograde route as well as with the completed vein graft.  Reverse segment of saphenous vein was anastomosed end-to-side to an arteriotomy in the mid portion of the obtuse marginal artery using 7-0 Prolene.  Second reverse segment of saphenous vein was anastomosed end-to-side to an arteriotomy in the mid portion of the first diagonal artery using 7-0 Prolene.  Distal end of the left internal mammary artery was anastomosed end-to-side to an arteriotomy in the mid to distal portion of the left anterior descending artery using 7-0 Prolene.  Proximal ends of 2 vein grafts were anastomosed with two 4 mm aortotomies using 6-0 Prolene.  Warm dose of retrograde hotshot cardioplegia was given and with high suction on the aortic root vent, the cross-clamp was released.  Organized rhythm resumed without the need for any defibrillations.  Rewarming and reperfusion allowed.  Two right ventricular pacing wires were placed.  The patient was gradually weaned off cardiopulmonary bypass using low dose epinephrine, Levophed and intraaortic balloon pump support.  Upon terminating cardiopulmonary bypass, LV function was -35% with improved wall motion.  Protamine was given and all cannulas removed.  Careful hemostasis was obtained.  Two anterior mediastinal and 1 left pleural chest tube were placed.  Sternum was closed with surgical steel wires.  Fascia, subcutaneous tissue, skin of  chest were closed in layers.  The patient transferred to ICU in stable but critical condition.         KEIRA THOMPSON MD             D: 2021   T: 2021   MT:       Name:     RACHEL KISER   MRN:      [id number]        Account:        [id number]   :      1970           Procedure Date: 2021      Document: [id number]       cc: Oswaldo Deutsch MD

## 2021-01-23 NOTE — PROGRESS NOTES
S/p CABG x 3 with Dr. Gibbons.     Neuro: Sedation holiday done - pt follows commands. Sedated w/ prop. Fent gtt for pain. Moves all extremities.     CV: Sinus rhythm, HR 70-80s. Frequent PVCs. 500ml albumin x 1 followed with 500ml LR for decreasing MAP and increasing lactic acid levels. CVP 6. PA 20/10s. CI 3. SVo2 80s.  IABP 1:1 - plan to keep in overnight.     Resp: AC 16/500/5 40%, minimal secretions. See flowsheet for CT output.     GI: OG to LIS.     : Adequate hourly UOP.     Endo: BG levels high - insulin gtt titrated.     Skin: 2 RN skin check done. Turned q2hrs.     MD notified about all critical lactic acid levels.     RN updated daughter over the phone.

## 2021-01-23 NOTE — PROGRESS NOTES
IABP removed by Dr Elmore without immediate complications and pt remains stable.    Pedro Montes, RRT

## 2021-01-23 NOTE — PROGRESS NOTES
1 day S/P CABG x3 (1/23/21)    Neuro: Pt is alert and oriented x4, IRAJ, sensation and movement in all extremities    CV: Sinus Rhythm, HR in the 80's, frequent PVC's and PAC's. Epi titrated to keep MAP above 65. CI 3.1, SVo2 65.    Resp: Extubated at 1600, lungs are clear and equal bilaterally diminished in the bases. Adequate cough     GI: bowel sounds hypoactive in all quadrants. Having sips of water    : Adequate hourly urinary output.     Endo: Insulin drip set at 5 units/hr blood sugars controlled    Skin: warm and moist, reposition q2hrs

## 2021-01-23 NOTE — PLAN OF CARE
OT 4E: Cancel - OT consult received. Pt working towards extubation and IABP removal. Will reschedule.

## 2021-01-23 NOTE — PROGRESS NOTES
Data: Patient is status post triple bypass on 1/23/21 requiring mechanical ventilation.  Intervention: Patient was placed on pressure support at 1415, ABG drawn at 1525. Results WNL. Patient able to follow commands, strong cough.  Assess: Patient extubated at 1600 per RT, placed on 3L nasal canula, adequate cough, aeration auscultated bilaterally.  Plan: Diluted given x1, O2 per nasal canula, encourage TCDB exercises, will continue to assess and monitor closely

## 2021-01-23 NOTE — PLAN OF CARE
PT 4E: HOLD. Pt working towards extubation and removal of IABP. OT to follow. Will reschedule once medically appropriate.

## 2021-01-23 NOTE — PROGRESS NOTES
CV ICU PROGRESS NOTE  January 23, 2021      CO-MORBIDITIES:   CAD, multiple vessel  (primary encounter diagnosis)  CAD (coronary artery disease)    ASSESSMENT: Jaxon Melendez is a 50 year old male with PMH of HLD, HTN, DM II, tobacco use, heavy alcohol use, ICM with 10-20% EF, CAD s/p PCI to RCA and LCx in 2010, found to have triple vessel disease. Pre-op IABP, intra-op cardioversion x1, uneventful CABG x 3. IABP removed POD1, planning for extubation today.     TODAY'S PROGRESS:   Remove IABP  PST and extubate  Wean pressors as able    PLAN:  Neuro/ pain/ sedation:  # Acute post-operative pain  Heavy alcohol use  - CIWA once sedation weaned   - Fentanyl gtt for pain, wean as able  - Propofol gtt for sedation, wean as able  - gabapentin / hydromorphone / oxycodone / APAP       Pulmonary care:   - MV overnight 1/22, will attempt to wean 01/23 morning   - Titrate FiO2 for SpO2 >92%     Cardiovascular:    ICM, low EF  CAD  HTN  - IABP 1/22 overnight, removed 01/23  - Monitor hemodynamic status.   - MAP goal > 65  - titrate NE, epi, vaso   - native rhythm, not paced, V wires in place.      GI /Nutrition:   - NPO, ADAT following extubation   - Bowel regimen with senna BID     Fluids/ Electrolytes/ Renal:   - Padron for strict I&Os  - lactic acidosis, resolved with fluid resuscitation, peak at 5.4      Endocrine:    Stress hyperglycemia  DM2  - Insulin gtt, transition to sliding scale insulin as able,w ill likely need to resume PTA lantus      ID/ Antibiotics:  - Perioperative antibiotics with cefazolin, complete     Heme:     # Acute blood loss anemia  - Hgb goal >7  - subcutaneous heparin      :  Hypospadias  - padron placed by urology   - Continue padron today      Prophylaxis:    - SCDs  - Bowel regimen  - PPI  - SQH      Lines/ tubes/ drains:  - CVC  - PAC  - Arterial line  - Padron  - ETT (remove)   - OG tube (remove)   - Mediastinal tube x2  - Pleural tube x1 (left)  - IABP (removed)      Disposition:  - [facility] ICU.      Patient seen, findings and plan discussed with CV ICU staff, Dr. Amezquita.    Bebe Elmore MD  Surgery Resident PGY3    ====================================    SUBJECTIVE:   No acute events overnight. Able to turn down augmentation and frequency on IABP, removed with no issues.     OBJECTIVE:   1. VITAL SIGNS:   Temp:  [97.2  F (36.2  C)-100.8  F (38.2  C)] 99  F (37.2  C)  Pulse:  [] 83  Resp:  [16-22] 18  MAP:  [59 mmHg-81 mmHg] 68 mmHg  Arterial Line BP: ()/(3-54) 112/51  FiO2 (%):  [40 %-50 %] 40 %  SpO2:  [95 %-99 %] 95 %  Ventilation Mode: CMV/AC  (Continuous Mandatory Ventilation/ Assist Control)  FiO2 (%): 40 %  Rate Set (breaths/minute): 16 breaths/min  Tidal Volume Set (mL): 550 mL  PEEP (cm H2O): 5 cmH2O  Oxygen Concentration (%): 40 %  Resp: 18      2. INTAKE/ OUTPUT:   I/O last 3 completed shifts:  In: 4252.25 [I.V.:2312.25; NG/GT:440; IV Piggyback:500]  Out: 2430 [Urine:1525; Emesis/NG output:200; Chest Tube:705]    3. PHYSICAL EXAMINATION:   General: adult male, resting in bed, intubated, sedated  Neuro: RASS -2  Resp: Breathing non-labored on vent   CV: RRR  Abdomen: Soft, Non-distended, Non-tender  Incisions: Vac holding good suction   Extremities: warm and well perfused    4. INVESTIGATIONS:   Arterial Blood Gases   Recent Labs   Lab 01/23/21  0348 01/22/21  2206 01/22/21  1450 01/22/21  1308   PH 7.49* 7.41 7.31* 7.39   PCO2 35 41 44 40   PO2 95 123* 148* 471*   HCO3 27 26 22 25     Complete Blood Count   Recent Labs   Lab 01/23/21  0355 01/22/21  2206 01/22/21  1446 01/22/21  1308 01/22/21  1306 01/22/21  0355 01/22/21  0355   WBC 26.3* 27.3* 34.5*  --   --   --  10.7   HGB 11.4* 12.1* 15.1 14.5  --    < > 15.4   * 179 203  --  183  --  180    < > = values in this interval not displayed.     Basic Metabolic Panel  Recent Labs   Lab 01/23/21  1354 01/23/21  0355 01/22/21  2206 01/22/21  1446 01/22/21  1308 01/22/21  0355 01/22/21  0355   NA  --  142 143 142 141   < > 138    POTASSIUM 4.5 4.5 3.4 3.6 4.1   < > 4.1   CHLORIDE  --  111* 110* 109 104   < > 108   CO2  --  25 24 21  --   --  24   BUN  --  11 10 8  --   --  8   CR  --  0.75 0.78 0.82  --   --  0.77   GLC  --  117* 209* 182* 214*   < > 119*    < > = values in this interval not displayed.     Liver Function Tests  Recent Labs   Lab 01/23/21  0355 01/22/21  1446 01/22/21  1306 01/22/21  0355 01/19/21  1416   AST 21 19  --   --  8   ALT 10 12  --   --  14   ALKPHOS 62 87  --   --  100   BILITOTAL 0.9 0.8  --   --  0.7   ALBUMIN 3.1* 3.0*  --   --  3.2*   INR  --  1.50* 1.54* 1.19* 1.22*     Pancreatic Enzymes  No lab results found in last 7 days.  Coagulation Profile  Recent Labs   Lab 01/22/21  1446 01/22/21  1306 01/22/21  0355 01/19/21  1416   INR 1.50* 1.54* 1.19* 1.22*   PTT 36 31  --  30         5. RADIOLOGY:   Recent Results (from the past 24 hour(s))   XR Chest Port 1 View    Narrative    EXAM: XR CHEST 1 VW 1/22/2021      HISTORY: eval swan position.    COMPARISON: Same-day radiograph.     TECHNIQUE: Frontal view of the chest.    FINDINGS: Right jugular Perkins-Renetta catheter tip is near the right main  pulmonary artery. Intra-aortic balloon pump superior marker projects 8  cm below the waldemar. Stable endotracheal tube, mediastinal drains,  left basilar chest tube, epicardial pacing wires. Cardiomegaly.  Increased opacification throughout the left lung..      Impression    IMPRESSION: Perkins-Renetta catheter and the remaining support devices are  in appropriate position. Increased left effusion.    I have personally reviewed the examination and initial interpretation  and I agree with the findings.    MARIELA CUELLAR MD   XR Chest Port 1 View    Narrative    EXAM: XR CHEST PORT 1 VW  1/23/2021 6:55 AM     HISTORY:  s.p CAB 1/22, eval volume status, lines, tubes       COMPARISON:  Chest x-ray 1/22/2021    FINDINGS:   Technique: Semiupright AP view of the chest.    Devices: Endotracheal tube tip is in the midthoracic trachea.  Right IJ  Fullerton-Renetta catheter tip is at the right main pulmonary artery. Enteric  tube passes below the diaphragm with side port overlying the expected  location of the stomach. Postsurgical changes of CABG with sternotomy  wires in place. Mediastinal drains are in stable position. Left  basilar chest tube unchanged in position. Intra-aortic balloon pump  marker position projected below the waldemar.    Cardiovascular: Unchanged cardiomegaly. The pulmonary vasculature are  distinct.     Lungs: Stable left lower lung opacification and small left pleural  effusion. No discernible pneumothorax.    Bones: No acute osseous abnormality.       Impression    IMPRESSION:   1. Stable position of support devices.  2. Stable left lower lung opacities and small left pleural effusion.    I have personally reviewed the examination and initial interpretation  and I agree with the findings.    MIKEY SENIOR MD       =========================================

## 2021-01-23 NOTE — PLAN OF CARE
Major Shift Events:  POD 1 today s/p CAB.  Remains intubated with IABP.  Lactate normalized, potassium replaced per protocol.  Continues to have frequent multifocal PVC's.  Back pacer VVI @ 60. Epi weaned down 0.06-0.02 and Vaso 2.4-2.  CVP 10-12 PAP 28/14, SVO2 77 and 70, ANDREA 2.5-3's.  IABP 1:1 Augmented low 100's.  Minimal CT output, UOP 25-50/hour.  Tmax 100.6.   Pt follows commands, RODRIGUEZ, nods head appropriately. Prop 20-40 and Fent @ 50.  Plan: Continue to closely monitor, remove IABP today and PST to extubate thereafter.   For vital signs and complete assessments, please see documentation flowsheets.

## 2021-01-23 NOTE — PROGRESS NOTES
[facility], [place]  Procedure Note          Extubation:       Jaxon Melendez  MRN# [id number]   January 23, 2021, 4:20 PM         Patient extubated at: January 23, 2021, 4:20 PM   Supplemental Oxygen: Via nasal cannula at 4 liters per minute   Cough: The cough is strong   Secretion Mode: Able to clear   Secretion Amount: Moderate amount, thin and clear in color   Respiratory Exam:: Breath sounds: clear and diminished     Location: bilaterally   Skin Exam:: Patient color: natural   Patient Status: Currently appears comfortable   Arterial Blood Gasses: pH Arterial (pH)   Date Value   01/23/2021 7.40     pO2 Arterial (mm Hg)   Date Value   01/23/2021 80     pCO2 Arterial (mm Hg)   Date Value   01/23/2021 42     Bicarbonate Arterial (mmol/L)   Date Value   01/23/2021 26            Recorded by Pedro Montes, RRT

## 2021-01-23 NOTE — PROGRESS NOTES
Admitted/transferred from: OR  Reason for admission/transfer: s/p CABG  Patient status upon admission/transfer: critical, stable  Interventions: pressors for MAP >65, sedation, pain control. IABP 1:1, 500LR, 500ml albumin  Plan: monitor labs/vs. Keep IABP/ETT in place overnight  2 RN skin assessment: completed by Janelle Mason  Result of skin assessment and interventions/actions: small, dark spot on L butt cheek. Buttox reddened. Incisions/drains/devices  Height, weight, drug calc weight: done  Patient belongings: none  MDRO education (if applicable): NA

## 2021-01-24 ENCOUNTER — APPOINTMENT (OUTPATIENT)
Dept: GENERAL RADIOLOGY | Facility: CLINIC | Age: 51
DRG: 235 | End: 2021-01-24
Attending: STUDENT IN AN ORGANIZED HEALTH CARE EDUCATION/TRAINING PROGRAM
Payer: COMMERCIAL

## 2021-01-24 ENCOUNTER — APPOINTMENT (OUTPATIENT)
Dept: OCCUPATIONAL THERAPY | Facility: CLINIC | Age: 51
DRG: 235 | End: 2021-01-24
Attending: NURSE PRACTITIONER
Payer: COMMERCIAL

## 2021-01-24 ENCOUNTER — APPOINTMENT (OUTPATIENT)
Dept: GENERAL RADIOLOGY | Facility: CLINIC | Age: 51
DRG: 235 | End: 2021-01-24
Attending: PHYSICIAN ASSISTANT
Payer: COMMERCIAL

## 2021-01-24 LAB
ABO + RH BLD: NORMAL
ABO + RH BLD: NORMAL
ANION GAP SERPL CALCULATED.3IONS-SCNC: 4 MMOL/L (ref 3–14)
BASE EXCESS BLDV CALC-SCNC: 2.3 MMOL/L
BASE EXCESS BLDV CALC-SCNC: 2.4 MMOL/L
BLD GP AB SCN SERPL QL: NORMAL
BLOOD BANK CMNT PATIENT-IMP: NORMAL
BUN SERPL-MCNC: 22 MG/DL (ref 7–30)
CALCIUM SERPL-MCNC: 8.7 MG/DL (ref 8.5–10.1)
CHLORIDE SERPL-SCNC: 107 MMOL/L (ref 94–109)
CO2 SERPL-SCNC: 26 MMOL/L (ref 20–32)
CREAT SERPL-MCNC: 0.75 MG/DL (ref 0.66–1.25)
ERYTHROCYTE [DISTWIDTH] IN BLOOD BY AUTOMATED COUNT: 12.9 % (ref 10–15)
GFR SERPL CREATININE-BSD FRML MDRD: >90 ML/MIN/{1.73_M2}
GLUCOSE BLDC GLUCOMTR-MCNC: 106 MG/DL (ref 70–99)
GLUCOSE BLDC GLUCOMTR-MCNC: 116 MG/DL (ref 70–99)
GLUCOSE BLDC GLUCOMTR-MCNC: 138 MG/DL (ref 70–99)
GLUCOSE BLDC GLUCOMTR-MCNC: 146 MG/DL (ref 70–99)
GLUCOSE BLDC GLUCOMTR-MCNC: 149 MG/DL (ref 70–99)
GLUCOSE BLDC GLUCOMTR-MCNC: 152 MG/DL (ref 70–99)
GLUCOSE BLDC GLUCOMTR-MCNC: 184 MG/DL (ref 70–99)
GLUCOSE SERPL-MCNC: 148 MG/DL (ref 70–99)
HCO3 BLDV-SCNC: 29 MMOL/L (ref 21–28)
HCO3 BLDV-SCNC: 29 MMOL/L (ref 21–28)
HCT VFR BLD AUTO: 36.3 % (ref 40–53)
HGB BLD-MCNC: 11.4 G/DL (ref 13.3–17.7)
LACTATE BLD-SCNC: 1.1 MMOL/L (ref 0.7–2)
MAGNESIUM SERPL-MCNC: 2.3 MG/DL (ref 1.6–2.3)
MCH RBC QN AUTO: 31.9 PG (ref 26.5–33)
MCHC RBC AUTO-ENTMCNC: 31.4 G/DL (ref 31.5–36.5)
MCV RBC AUTO: 102 FL (ref 78–100)
O2/TOTAL GAS SETTING VFR VENT: ABNORMAL %
O2/TOTAL GAS SETTING VFR VENT: ABNORMAL %
OXYHGB MFR BLDV: 56 %
OXYHGB MFR BLDV: 59 %
PCO2 BLDV: 56 MM HG (ref 40–50)
PCO2 BLDV: 56 MM HG (ref 40–50)
PH BLDV: 7.33 PH (ref 7.32–7.43)
PH BLDV: 7.33 PH (ref 7.32–7.43)
PLATELET # BLD AUTO: 121 10E9/L (ref 150–450)
PO2 BLDV: 32 MM HG (ref 25–47)
PO2 BLDV: 33 MM HG (ref 25–47)
POTASSIUM SERPL-SCNC: 4.8 MMOL/L (ref 3.4–5.3)
RBC # BLD AUTO: 3.57 10E12/L (ref 4.4–5.9)
SODIUM SERPL-SCNC: 136 MMOL/L (ref 133–144)
SPECIMEN EXP DATE BLD: NORMAL
WBC # BLD AUTO: 37.1 10E9/L (ref 4–11)

## 2021-01-24 PROCEDURE — 86850 RBC ANTIBODY SCREEN: CPT | Performed by: SURGERY

## 2021-01-24 PROCEDURE — 94799 UNLISTED PULMONARY SVC/PX: CPT

## 2021-01-24 PROCEDURE — 250N000013 HC RX MED GY IP 250 OP 250 PS 637: Performed by: STUDENT IN AN ORGANIZED HEALTH CARE EDUCATION/TRAINING PROGRAM

## 2021-01-24 PROCEDURE — 82805 BLOOD GASES W/O2 SATURATION: CPT | Performed by: STUDENT IN AN ORGANIZED HEALTH CARE EDUCATION/TRAINING PROGRAM

## 2021-01-24 PROCEDURE — 999N000157 HC STATISTIC RCP TIME EA 10 MIN

## 2021-01-24 PROCEDURE — 85027 COMPLETE CBC AUTOMATED: CPT | Performed by: NURSE PRACTITIONER

## 2021-01-24 PROCEDURE — 94660 CPAP INITIATION&MGMT: CPT

## 2021-01-24 PROCEDURE — 36415 COLL VENOUS BLD VENIPUNCTURE: CPT | Performed by: SURGERY

## 2021-01-24 PROCEDURE — 250N000012 HC RX MED GY IP 250 OP 636 PS 637: Performed by: STUDENT IN AN ORGANIZED HEALTH CARE EDUCATION/TRAINING PROGRAM

## 2021-01-24 PROCEDURE — 94640 AIRWAY INHALATION TREATMENT: CPT

## 2021-01-24 PROCEDURE — 250N000009 HC RX 250: Performed by: PHYSICIAN ASSISTANT

## 2021-01-24 PROCEDURE — 80048 BASIC METABOLIC PNL TOTAL CA: CPT | Performed by: NURSE PRACTITIONER

## 2021-01-24 PROCEDURE — 258N000003 HC RX IP 258 OP 636: Performed by: STUDENT IN AN ORGANIZED HEALTH CARE EDUCATION/TRAINING PROGRAM

## 2021-01-24 PROCEDURE — 97165 OT EVAL LOW COMPLEX 30 MIN: CPT | Mod: GO

## 2021-01-24 PROCEDURE — 250N000013 HC RX MED GY IP 250 OP 250 PS 637: Performed by: SURGERY

## 2021-01-24 PROCEDURE — 999N001017 HC STATISTIC GLUCOSE BY METER IP

## 2021-01-24 PROCEDURE — 250N000013 HC RX MED GY IP 250 OP 250 PS 637: Performed by: NURSE PRACTITIONER

## 2021-01-24 PROCEDURE — 250N000011 HC RX IP 250 OP 636: Performed by: STUDENT IN AN ORGANIZED HEALTH CARE EDUCATION/TRAINING PROGRAM

## 2021-01-24 PROCEDURE — 97530 THERAPEUTIC ACTIVITIES: CPT | Mod: GO

## 2021-01-24 PROCEDURE — 71045 X-RAY EXAM CHEST 1 VIEW: CPT | Mod: 26 | Performed by: STUDENT IN AN ORGANIZED HEALTH CARE EDUCATION/TRAINING PROGRAM

## 2021-01-24 PROCEDURE — 71045 X-RAY EXAM CHEST 1 VIEW: CPT | Mod: 26 | Performed by: RADIOLOGY

## 2021-01-24 PROCEDURE — 83735 ASSAY OF MAGNESIUM: CPT | Performed by: NURSE PRACTITIONER

## 2021-01-24 PROCEDURE — 71045 X-RAY EXAM CHEST 1 VIEW: CPT | Mod: 77

## 2021-01-24 PROCEDURE — 83605 ASSAY OF LACTIC ACID: CPT | Performed by: SURGERY

## 2021-01-24 PROCEDURE — 94640 AIRWAY INHALATION TREATMENT: CPT | Mod: 76

## 2021-01-24 PROCEDURE — 71045 X-RAY EXAM CHEST 1 VIEW: CPT

## 2021-01-24 PROCEDURE — 86901 BLOOD TYPING SEROLOGIC RH(D): CPT | Performed by: SURGERY

## 2021-01-24 PROCEDURE — 250N000013 HC RX MED GY IP 250 OP 250 PS 637: Performed by: PHYSICIAN ASSISTANT

## 2021-01-24 PROCEDURE — 250N000009 HC RX 250: Performed by: STUDENT IN AN ORGANIZED HEALTH CARE EDUCATION/TRAINING PROGRAM

## 2021-01-24 PROCEDURE — 99233 SBSQ HOSP IP/OBS HIGH 50: CPT | Mod: GC | Performed by: INTERNAL MEDICINE

## 2021-01-24 PROCEDURE — 200N000002 HC R&B ICU UMMC

## 2021-01-24 PROCEDURE — 86900 BLOOD TYPING SEROLOGIC ABO: CPT | Performed by: SURGERY

## 2021-01-24 RX ORDER — HYDROXYZINE HYDROCHLORIDE 25 MG/1
25 TABLET, FILM COATED ORAL ONCE
Status: COMPLETED | OUTPATIENT
Start: 2021-01-24 | End: 2021-01-24

## 2021-01-24 RX ORDER — LANOLIN ALCOHOL/MO/W.PET/CERES
100 CREAM (GRAM) TOPICAL 3 TIMES DAILY
Status: COMPLETED | OUTPATIENT
Start: 2021-01-25 | End: 2021-01-30

## 2021-01-24 RX ORDER — POLYETHYLENE GLYCOL 3350 17 G/17G
17 POWDER, FOR SOLUTION ORAL DAILY
Status: DISCONTINUED | OUTPATIENT
Start: 2021-01-25 | End: 2021-02-01 | Stop reason: HOSPADM

## 2021-01-24 RX ORDER — ALBUTEROL SULFATE 90 UG/1
2 AEROSOL, METERED RESPIRATORY (INHALATION) EVERY 6 HOURS PRN
Status: DISCONTINUED | OUTPATIENT
Start: 2021-01-24 | End: 2021-02-01 | Stop reason: HOSPADM

## 2021-01-24 RX ORDER — FOLIC ACID 1 MG/1
1 TABLET ORAL DAILY
Status: DISCONTINUED | OUTPATIENT
Start: 2021-01-26 | End: 2021-02-01 | Stop reason: HOSPADM

## 2021-01-24 RX ORDER — PANTOPRAZOLE SODIUM 40 MG/1
40 TABLET, DELAYED RELEASE ORAL
Status: DISCONTINUED | OUTPATIENT
Start: 2021-01-25 | End: 2021-01-26

## 2021-01-24 RX ORDER — ASPIRIN 325 MG
325 TABLET ORAL DAILY
Status: DISCONTINUED | OUTPATIENT
Start: 2021-01-25 | End: 2021-01-28

## 2021-01-24 RX ORDER — MULTIPLE VITAMINS W/ MINERALS TAB 9MG-400MCG
1 TAB ORAL DAILY
Status: DISCONTINUED | OUTPATIENT
Start: 2021-01-25 | End: 2021-02-01 | Stop reason: HOSPADM

## 2021-01-24 RX ORDER — IPRATROPIUM BROMIDE AND ALBUTEROL SULFATE 2.5; .5 MG/3ML; MG/3ML
3 SOLUTION RESPIRATORY (INHALATION)
Status: DISCONTINUED | OUTPATIENT
Start: 2021-01-24 | End: 2021-01-25

## 2021-01-24 RX ORDER — FUROSEMIDE 10 MG/ML
60 INJECTION INTRAMUSCULAR; INTRAVENOUS 3 TIMES DAILY
Status: DISCONTINUED | OUTPATIENT
Start: 2021-01-24 | End: 2021-01-25

## 2021-01-24 RX ORDER — AMOXICILLIN 250 MG
1 CAPSULE ORAL 2 TIMES DAILY
Status: DISCONTINUED | OUTPATIENT
Start: 2021-01-24 | End: 2021-02-01 | Stop reason: HOSPADM

## 2021-01-24 RX ORDER — AMOXICILLIN 250 MG
2 CAPSULE ORAL 2 TIMES DAILY
Status: DISCONTINUED | OUTPATIENT
Start: 2021-01-24 | End: 2021-02-01 | Stop reason: HOSPADM

## 2021-01-24 RX ORDER — NICOTINE POLACRILEX 4 MG
15-30 LOZENGE BUCCAL
Status: DISCONTINUED | OUTPATIENT
Start: 2021-01-24 | End: 2021-01-24

## 2021-01-24 RX ORDER — DEXTROSE MONOHYDRATE 25 G/50ML
25-50 INJECTION, SOLUTION INTRAVENOUS
Status: DISCONTINUED | OUTPATIENT
Start: 2021-01-24 | End: 2021-01-24

## 2021-01-24 RX ORDER — LANOLIN ALCOHOL/MO/W.PET/CERES
100 CREAM (GRAM) TOPICAL DAILY
Status: DISCONTINUED | OUTPATIENT
Start: 2021-01-31 | End: 2021-02-01 | Stop reason: HOSPADM

## 2021-01-24 RX ADMIN — GABAPENTIN 300 MG: 300 CAPSULE ORAL at 08:41

## 2021-01-24 RX ADMIN — ACETAMINOPHEN 975 MG: 325 TABLET, FILM COATED ORAL at 15:15

## 2021-01-24 RX ADMIN — FUROSEMIDE 60 MG: 10 INJECTION, SOLUTION INTRAMUSCULAR; INTRAVENOUS at 15:15

## 2021-01-24 RX ADMIN — HEPARIN SODIUM 5000 UNITS: 5000 INJECTION, SOLUTION INTRAVENOUS; SUBCUTANEOUS at 12:08

## 2021-01-24 RX ADMIN — OXYCODONE HYDROCHLORIDE 10 MG: 5 TABLET ORAL at 16:33

## 2021-01-24 RX ADMIN — ACETAMINOPHEN 975 MG: 325 TABLET, FILM COATED ORAL at 22:13

## 2021-01-24 RX ADMIN — ASPIRIN 325 MG ORAL TABLET 325 MG: 325 PILL ORAL at 08:41

## 2021-01-24 RX ADMIN — IPRATROPIUM BROMIDE AND ALBUTEROL SULFATE 3 ML: .5; 3 SOLUTION RESPIRATORY (INHALATION) at 20:29

## 2021-01-24 RX ADMIN — Medication 12.5 MG: at 20:03

## 2021-01-24 RX ADMIN — FUROSEMIDE 60 MG: 10 INJECTION, SOLUTION INTRAMUSCULAR; INTRAVENOUS at 19:57

## 2021-01-24 RX ADMIN — OXYCODONE HYDROCHLORIDE 10 MG: 5 TABLET ORAL at 10:49

## 2021-01-24 RX ADMIN — HYDROMORPHONE HYDROCHLORIDE 0.5 MG: 1 INJECTION, SOLUTION INTRAMUSCULAR; INTRAVENOUS; SUBCUTANEOUS at 02:01

## 2021-01-24 RX ADMIN — HYDROXYZINE HYDROCHLORIDE 25 MG: 25 TABLET, FILM COATED ORAL at 17:55

## 2021-01-24 RX ADMIN — HEPARIN SODIUM 5000 UNITS: 5000 INJECTION, SOLUTION INTRAVENOUS; SUBCUTANEOUS at 20:07

## 2021-01-24 RX ADMIN — METHOCARBAMOL 750 MG: 750 TABLET, FILM COATED ORAL at 23:53

## 2021-01-24 RX ADMIN — OXYCODONE HYDROCHLORIDE 10 MG: 5 TABLET ORAL at 22:25

## 2021-01-24 RX ADMIN — METHOCARBAMOL 750 MG: 750 TABLET, FILM COATED ORAL at 10:49

## 2021-01-24 RX ADMIN — MUPIROCIN 0.5 G: 20 OINTMENT TOPICAL at 20:09

## 2021-01-24 RX ADMIN — IPRATROPIUM BROMIDE AND ALBUTEROL SULFATE 3 ML: .5; 3 SOLUTION RESPIRATORY (INHALATION) at 17:25

## 2021-01-24 RX ADMIN — LORAZEPAM 1 MG: 0.5 TABLET ORAL at 05:36

## 2021-01-24 RX ADMIN — OXYCODONE HYDROCHLORIDE 10 MG: 5 TABLET ORAL at 05:36

## 2021-01-24 RX ADMIN — MULTIVITAMIN 15 ML: LIQUID ORAL at 08:41

## 2021-01-24 RX ADMIN — DOCUSATE SODIUM AND SENNOSIDES 2 TABLET: 8.6; 5 TABLET, FILM COATED ORAL at 19:55

## 2021-01-24 RX ADMIN — HEPARIN SODIUM 5000 UNITS: 5000 INJECTION, SOLUTION INTRAVENOUS; SUBCUTANEOUS at 04:23

## 2021-01-24 RX ADMIN — GABAPENTIN 300 MG: 300 CAPSULE ORAL at 19:54

## 2021-01-24 RX ADMIN — DOCUSATE SODIUM AND SENNOSIDES 1 TABLET: 8.6; 5 TABLET, FILM COATED ORAL at 08:41

## 2021-01-24 RX ADMIN — METHOCARBAMOL 750 MG: 750 TABLET, FILM COATED ORAL at 02:03

## 2021-01-24 RX ADMIN — THIAMINE HYDROCHLORIDE 200 MG: 100 INJECTION, SOLUTION INTRAMUSCULAR; INTRAVENOUS at 16:33

## 2021-01-24 RX ADMIN — OXYCODONE HYDROCHLORIDE 10 MG: 5 TABLET ORAL at 02:03

## 2021-01-24 RX ADMIN — THIAMINE HYDROCHLORIDE 200 MG: 100 INJECTION, SOLUTION INTRAMUSCULAR; INTRAVENOUS at 22:33

## 2021-01-24 RX ADMIN — FOLIC ACID 1 MG: 5 INJECTION, SOLUTION INTRAMUSCULAR; INTRAVENOUS; SUBCUTANEOUS at 08:42

## 2021-01-24 RX ADMIN — THIAMINE HYDROCHLORIDE 200 MG: 100 INJECTION, SOLUTION INTRAMUSCULAR; INTRAVENOUS at 08:42

## 2021-01-24 RX ADMIN — PANTOPRAZOLE SODIUM 40 MG: 40 TABLET, DELAYED RELEASE ORAL at 08:41

## 2021-01-24 RX ADMIN — ACETAMINOPHEN 975 MG: 325 TABLET, FILM COATED ORAL at 05:36

## 2021-01-24 RX ADMIN — INSULIN GLARGINE 14 UNITS: 100 INJECTION, SOLUTION SUBCUTANEOUS at 10:50

## 2021-01-24 RX ADMIN — MUPIROCIN 0.5 G: 20 OINTMENT TOPICAL at 08:42

## 2021-01-24 RX ADMIN — ATORVASTATIN CALCIUM 40 MG: 40 TABLET, FILM COATED ORAL at 08:41

## 2021-01-24 ASSESSMENT — ACTIVITIES OF DAILY LIVING (ADL)
ADLS_ACUITY_SCORE: 16
ADLS_ACUITY_SCORE: 16
ADLS_ACUITY_SCORE: 15
PREVIOUS_RESPONSIBILITIES: MEAL PREP;HOUSEKEEPING;SHOPPING;LAUNDRY;MEDICATION MANAGEMENT;FINANCES;DRIVING
ADLS_ACUITY_SCORE: 15
ADLS_ACUITY_SCORE: 16
ADLS_ACUITY_SCORE: 15

## 2021-01-24 NOTE — PROGRESS NOTES
Major Shift Events:  No acute events overnight. Pt alert, helping shift self in bed. Still c/o pain, 5/10 with PO Oxy, Robaxin, and IV Dilaudid. Lungs diminished, pt on 4L, needs encouragement to cough/deep breathe. Tolerating water well, urine output adequate after 40mg IV lasix. Remains on 2 units IV insulin. Required 1mg PO ativan this AM d/t score of 8 on withdrawal scale: Pt experiencing visible tremors, diaphoresis, itchiness, agitation. SR, hemodynamics stable.    Plan: Wean Epi and continue with aggressive pulmonary toilet.    For vital signs and complete assessments, please see documentation flowsheets.

## 2021-01-24 NOTE — PROGRESS NOTES
Transfer        2:55 PM  ---------------------------------------------------------------------  Transferred to/from: 4E  Via: Bed  Reason for transfer: appropriate for 6C  Family: Will Notify Manda (daughter)  Belongings: Missing, 4E RN (DONNA Armando) states she will call pre-op and try to locate pt's belongings. Awaiting return call.  Chart: Sent with pt  Medications: Meds from bin sent with pt  Report called to:     Temp:  [96.8  F (36  C)-98  F (36.7  C)] 97.6  F (36.4  C)  Pulse:  [] 69  Resp:  [12-71] 20  BP: (142)/(70) 142/70  MAP:  [62 mmHg-104 mmHg] 75 mmHg  Arterial Line BP: ()/(36-81) 122/56  FiO2 (%):  [40 %] 40 %  SpO2:  [89 %-96 %] 90 %      2 RN skin check done with DONNA Bragg. CT x3 to suction. PW capped. Wound vac to sternal incision. LLE wrapped with ACE. Brown spots, open to buttocks. Meplex in place. 6L O2 via NC. IS at bedside, pt reaching 500. NSR 70. Tylenol scheduled for pain.

## 2021-01-24 NOTE — PROGRESS NOTES
1430    Pt transferred to 6C Room 17, Bed 1. Report called to 6C circulator. Pt went on 6C bed and on 6L NC. IVs saline locked. Pt tolerated transfer.

## 2021-01-24 NOTE — PROGRESS NOTES
CV ICU PROGRESS NOTE  January 24, 2021      CO-MORBIDITIES:   CAD, multiple vessel  (primary encounter diagnosis)  CAD (coronary artery disease)    ASSESSMENT: Jaxon Melendez is a 50 year old male with PMH of HLD, HTN, DM II, tobacco use, heavy alcohol use, ICM with 10-20% EF, CAD s/p PCI to RCA and LCx in 2010, found to have triple vessel disease. Pre-op IABP, intra-op cardioversion x1, uneventful CABG x 3. IABP removed POD1, planning for extubation today.     TODAY'S PROGRESS:   - stop epinephrine   - follow-up PAC numbers at noon   - lasix 60 mg TID with goal -500 to 1 L   - transition to subcutaneous insulin     PLAN:  Neuro/ pain/ sedation:  # Acute post-operative pain  Heavy alcohol use  - CIWA  - thiamine and folate  - gabapentin / hydromorphone / oxycodone / APAP    Pulmonary care:   - Titrate FiO2 for SpO2 >92%     Cardiovascular:    ICM, low EF  CAD  HTN  - IABP 1/22 overnight, removed 01/23  - Monitor hemodynamic status.   -  mg / atorvastatin 40 mg   - MAP goal > 65  - off epinephrine   - PAC numbers at noon   - native rhythm, not paced, V wires in place.      GI /Nutrition:   - ADAT  - Bowel regimen with senna BID     Fluids/ Electrolytes/ Renal:   - Padron for strict I&Os  - lactic acidosis, resolved with fluid resuscitation, peak at 5.4  - Lasix 60 mg TID for goal -500 mL to 1 L today       Endocrine:    Stress hyperglycemia  DM2  - Insulin gtt, transition to sliding scale insulin as able,w ill likely need to resume PTA lantus (25 units)   - Lantus 12 units, ISS  - Stop infusion      ID/ Antibiotics:  - Perioperative antibiotics with cefazolin, complete  - CTM leukocytosis      Heme:     # Acute blood loss anemia  - Hgb goal >7  - subcutaneous heparin      :  Hypospadias  - padron placed by urology   - Continue padron today      Prophylaxis:    - SCDs  - Bowel regimen  - PPI  - SQH      Lines/ tubes/ drains:  - CVC  - PAC  - Arterial line  - Padron  - Mediastinal tube x2  - Pleural tube x1  (left)     Disposition:  - CV ICU.     Patient seen, findings and plan discussed with CV ICU staff, Dr. Amezquita.    Shai Maynard III, MD   Resident PGY3    ====================================    SUBJECTIVE:   Ativan x 1 for high CIWA  PRN pain meds  Good response to Lasix    OBJECTIVE:   1. VITAL SIGNS:   Temp:  [96.8  F (36  C)-99.1  F (37.3  C)] 97.5  F (36.4  C)  Pulse:  [] 75  Resp:  [12-71] 16  MAP:  [60 mmHg-104 mmHg] 75 mmHg  Arterial Line BP: ()/(10-71) 122/57  FiO2 (%):  [40 %] 40 %  SpO2:  [91 %-98 %] 93 %  Ventilation Mode: CPAP/PS  (Continuous positive airway pressure with Pressure Support)  FiO2 (%): 40 %  Rate Set (breaths/minute): 16 breaths/min  Tidal Volume Set (mL): 550 mL  PEEP (cm H2O): 5 cmH2O  Pressure Support (cm H2O): 7 cmH2O  Oxygen Concentration (%): 40 %  Resp: 16      2. INTAKE/ OUTPUT:   I/O last 3 completed shifts:  In: 2236.28 [P.O.:600; I.V.:1336.28; NG/GT:300]  Out: 1365 [Urine:875; Emesis/NG output:200; Chest Tube:290]    3. PHYSICAL EXAMINATION:   General: adult male, resting in bed, alter  Neuro: A&Ox3, no tremor. Moving all extemities  Resp: Breathing non-labored on NC  CV: RRR  Abdomen: Soft, Non-distended, Non-tender  Incisions: Vac holding good suction   Extremities: warm and well perfused    4. INVESTIGATIONS:   Arterial Blood Gases   Recent Labs   Lab 01/23/21  2202 01/23/21  1525 01/23/21  0348 01/22/21 2206   PH 7.34* 7.40 7.49* 7.41   PCO2 47* 42 35 41   PO2 91 80 95 123*   HCO3 25 26 27 26     Complete Blood Count   Recent Labs   Lab 01/24/21  0419 01/23/21  0355 01/22/21 2206 01/22/21  1446   WBC 37.1* 26.3* 27.3* 34.5*   HGB 11.4* 11.4* 12.1* 15.1   * 134* 179 203     Basic Metabolic Panel  Recent Labs   Lab 01/24/21  0419 01/23/21  1354 01/23/21  0355 01/22/21 2206 01/22/21  1446     --  142 143 142   POTASSIUM 4.8 4.5 4.5 3.4 3.6   CHLORIDE 107  --  111* 110* 109   CO2 26  --  25 24 21   BUN 22  --  11 10 8   CR 0.75  --  0.75 0.78  0.82   *  --  117* 209* 182*     Liver Function Tests  Recent Labs   Lab 01/23/21  0355 01/22/21  1446 01/22/21  1306 01/22/21  0355 01/19/21  1416   AST 21 19  --   --  8   ALT 10 12  --   --  14   ALKPHOS 62 87  --   --  100   BILITOTAL 0.9 0.8  --   --  0.7   ALBUMIN 3.1* 3.0*  --   --  3.2*   INR  --  1.50* 1.54* 1.19* 1.22*     Pancreatic Enzymes  No lab results found in last 7 days.  Coagulation Profile  Recent Labs   Lab 01/22/21  1446 01/22/21  1306 01/22/21  0355 01/19/21  1416   INR 1.50* 1.54* 1.19* 1.22*   PTT 36 31  --  30         5. RADIOLOGY:   Recent Results (from the past 24 hour(s))   XR Chest Port 1 View    Narrative    EXAM: XR CHEST PORT 1 VW  1/23/2021 6:55 AM     HISTORY:  s.p CAB 1/22, eval volume status, lines, tubes       COMPARISON:  Chest x-ray 1/22/2021    FINDINGS:   Technique: Semiupright AP view of the chest.    Devices: Endotracheal tube tip is in the midthoracic trachea. Right IJ  Wendell-Renetta catheter tip is at the right main pulmonary artery. Enteric  tube passes below the diaphragm with side port overlying the expected  location of the stomach. Postsurgical changes of CABG with sternotomy  wires in place. Mediastinal drains are in stable position. Left  basilar chest tube unchanged in position. Intra-aortic balloon pump  marker position projected below the waldemar.    Cardiovascular: Unchanged cardiomegaly. The pulmonary vasculature are  distinct.     Lungs: Stable left lower lung opacification and small left pleural  effusion. No discernible pneumothorax.    Bones: No acute osseous abnormality.       Impression    IMPRESSION:   1. Stable position of support devices.  2. Stable left lower lung opacities and small left pleural effusion.    I have personally reviewed the examination and initial interpretation  and I agree with the findings.    MIKEY SENIOR MD       =========================================

## 2021-01-24 NOTE — PROGRESS NOTES
CVTS:     Up from ICU and having increased O2 needs this evening.   Already on lasix 60 mg IV TID.   Ordered stat CXR.   Adding duonebs, flutter valve, and albuterol MDI.       Chano Zhao PA-C  Cardiothoracic Surgery  Pager [phone]    5:20 PM   January 24, 2021

## 2021-01-25 ENCOUNTER — APPOINTMENT (OUTPATIENT)
Dept: GENERAL RADIOLOGY | Facility: CLINIC | Age: 51
DRG: 235 | End: 2021-01-25
Attending: SURGERY
Payer: COMMERCIAL

## 2021-01-25 ENCOUNTER — ANESTHESIA (OUTPATIENT)
Dept: INTENSIVE CARE | Facility: CLINIC | Age: 51
DRG: 235 | End: 2021-01-25
Payer: COMMERCIAL

## 2021-01-25 ENCOUNTER — APPOINTMENT (OUTPATIENT)
Dept: GENERAL RADIOLOGY | Facility: CLINIC | Age: 51
DRG: 235 | End: 2021-01-25
Attending: STUDENT IN AN ORGANIZED HEALTH CARE EDUCATION/TRAINING PROGRAM
Payer: COMMERCIAL

## 2021-01-25 ENCOUNTER — APPOINTMENT (OUTPATIENT)
Dept: CARDIOLOGY | Facility: CLINIC | Age: 51
DRG: 235 | End: 2021-01-25
Attending: NURSE PRACTITIONER
Payer: COMMERCIAL

## 2021-01-25 ENCOUNTER — ANESTHESIA EVENT (OUTPATIENT)
Dept: INTENSIVE CARE | Facility: CLINIC | Age: 51
DRG: 235 | End: 2021-01-25
Payer: COMMERCIAL

## 2021-01-25 ENCOUNTER — APPOINTMENT (OUTPATIENT)
Dept: GENERAL RADIOLOGY | Facility: CLINIC | Age: 51
DRG: 235 | End: 2021-01-25
Attending: NURSE PRACTITIONER
Payer: COMMERCIAL

## 2021-01-25 LAB
ALBUMIN UR-MCNC: 30 MG/DL
ANION GAP SERPL CALCULATED.3IONS-SCNC: 5 MMOL/L (ref 3–14)
ANION GAP SERPL CALCULATED.3IONS-SCNC: 6 MMOL/L (ref 3–14)
ANION GAP SERPL CALCULATED.3IONS-SCNC: 7 MMOL/L (ref 3–14)
APPEARANCE UR: CLEAR
BASE EXCESS BLDA CALC-SCNC: 1 MMOL/L
BASE EXCESS BLDA CALC-SCNC: 1.7 MMOL/L
BASE EXCESS BLDA CALC-SCNC: 4.2 MMOL/L
BASE EXCESS BLDA CALC-SCNC: 4.7 MMOL/L
BASOPHILS # BLD AUTO: 0 10E9/L (ref 0–0.2)
BASOPHILS NFR BLD AUTO: 0.2 %
BILIRUB UR QL STRIP: NEGATIVE
BUN SERPL-MCNC: 32 MG/DL (ref 7–30)
BUN SERPL-MCNC: 38 MG/DL (ref 7–30)
BUN SERPL-MCNC: 38 MG/DL (ref 7–30)
CALCIUM SERPL-MCNC: 8.9 MG/DL (ref 8.5–10.1)
CALCIUM SERPL-MCNC: 9.2 MG/DL (ref 8.5–10.1)
CALCIUM SERPL-MCNC: 9.3 MG/DL (ref 8.5–10.1)
CHLORIDE SERPL-SCNC: 100 MMOL/L (ref 94–109)
CHLORIDE SERPL-SCNC: 98 MMOL/L (ref 94–109)
CHLORIDE SERPL-SCNC: 99 MMOL/L (ref 94–109)
CO2 SERPL-SCNC: 26 MMOL/L (ref 20–32)
CO2 SERPL-SCNC: 28 MMOL/L (ref 20–32)
CO2 SERPL-SCNC: 28 MMOL/L (ref 20–32)
COLOR UR AUTO: YELLOW
CREAT SERPL-MCNC: 0.81 MG/DL (ref 0.66–1.25)
CREAT SERPL-MCNC: 0.83 MG/DL (ref 0.66–1.25)
CREAT SERPL-MCNC: 0.88 MG/DL (ref 0.66–1.25)
DIFFERENTIAL METHOD BLD: ABNORMAL
EOSINOPHIL # BLD AUTO: 0.1 10E9/L (ref 0–0.7)
EOSINOPHIL NFR BLD AUTO: 0.3 %
ERYTHROCYTE [DISTWIDTH] IN BLOOD BY AUTOMATED COUNT: 12.3 % (ref 10–15)
ERYTHROCYTE [DISTWIDTH] IN BLOOD BY AUTOMATED COUNT: 12.3 % (ref 10–15)
ERYTHROCYTE [DISTWIDTH] IN BLOOD BY AUTOMATED COUNT: 12.4 % (ref 10–15)
GFR SERPL CREATININE-BSD FRML MDRD: >90 ML/MIN/{1.73_M2}
GLUCOSE BLDC GLUCOMTR-MCNC: 123 MG/DL (ref 70–99)
GLUCOSE BLDC GLUCOMTR-MCNC: 123 MG/DL (ref 70–99)
GLUCOSE BLDC GLUCOMTR-MCNC: 129 MG/DL (ref 70–99)
GLUCOSE BLDC GLUCOMTR-MCNC: 161 MG/DL (ref 70–99)
GLUCOSE BLDC GLUCOMTR-MCNC: 179 MG/DL (ref 70–99)
GLUCOSE BLDC GLUCOMTR-MCNC: 192 MG/DL (ref 70–99)
GLUCOSE BLDC GLUCOMTR-MCNC: 202 MG/DL (ref 70–99)
GLUCOSE SERPL-MCNC: 101 MG/DL (ref 70–99)
GLUCOSE SERPL-MCNC: 178 MG/DL (ref 70–99)
GLUCOSE SERPL-MCNC: 200 MG/DL (ref 70–99)
GLUCOSE UR STRIP-MCNC: NEGATIVE MG/DL
GRAM STN SPEC: NORMAL
GRAM STN SPEC: NORMAL
HCO3 BLD-SCNC: 28 MMOL/L (ref 21–28)
HCO3 BLD-SCNC: 28 MMOL/L (ref 21–28)
HCO3 BLD-SCNC: 29 MMOL/L (ref 21–28)
HCO3 BLD-SCNC: 30 MMOL/L (ref 21–28)
HCT VFR BLD AUTO: 33.3 % (ref 40–53)
HCT VFR BLD AUTO: 34.4 % (ref 40–53)
HCT VFR BLD AUTO: 35.2 % (ref 40–53)
HGB BLD-MCNC: 10.5 G/DL (ref 13.3–17.7)
HGB BLD-MCNC: 10.9 G/DL (ref 13.3–17.7)
HGB BLD-MCNC: 11.4 G/DL (ref 13.3–17.7)
HGB UR QL STRIP: ABNORMAL
HYALINE CASTS #/AREA URNS LPF: 4 /LPF (ref 0–2)
IMM GRANULOCYTES # BLD: 0.2 10E9/L (ref 0–0.4)
IMM GRANULOCYTES NFR BLD: 0.9 %
KETONES UR STRIP-MCNC: 5 MG/DL
LACTATE BLD-SCNC: 1.4 MMOL/L (ref 0.7–2)
LEUKOCYTE ESTERASE UR QL STRIP: ABNORMAL
LYMPHOCYTES # BLD AUTO: 1.6 10E9/L (ref 0.8–5.3)
LYMPHOCYTES NFR BLD AUTO: 8.3 %
Lab: NORMAL
MAGNESIUM SERPL-MCNC: 2.2 MG/DL (ref 1.6–2.3)
MAGNESIUM SERPL-MCNC: 2.2 MG/DL (ref 1.6–2.3)
MCH RBC QN AUTO: 31.2 PG (ref 26.5–33)
MCH RBC QN AUTO: 31.5 PG (ref 26.5–33)
MCH RBC QN AUTO: 31.8 PG (ref 26.5–33)
MCHC RBC AUTO-ENTMCNC: 31.5 G/DL (ref 31.5–36.5)
MCHC RBC AUTO-ENTMCNC: 31.7 G/DL (ref 31.5–36.5)
MCHC RBC AUTO-ENTMCNC: 32.4 G/DL (ref 31.5–36.5)
MCV RBC AUTO: 98 FL (ref 78–100)
MCV RBC AUTO: 99 FL (ref 78–100)
MCV RBC AUTO: 99 FL (ref 78–100)
MONOCYTES # BLD AUTO: 1.6 10E9/L (ref 0–1.3)
MONOCYTES NFR BLD AUTO: 8.3 %
MUCOUS THREADS #/AREA URNS LPF: PRESENT /LPF
NEUTROPHILS # BLD AUTO: 16.1 10E9/L (ref 1.6–8.3)
NEUTROPHILS NFR BLD AUTO: 82 %
NITRATE UR QL: NEGATIVE
NRBC # BLD AUTO: 0 10*3/UL
NRBC BLD AUTO-RTO: 0 /100
O2/TOTAL GAS SETTING VFR VENT: 100 %
O2/TOTAL GAS SETTING VFR VENT: 100 %
O2/TOTAL GAS SETTING VFR VENT: 50 %
O2/TOTAL GAS SETTING VFR VENT: 50 %
PCO2 BLD: 43 MM HG (ref 35–45)
PCO2 BLD: 44 MM HG (ref 35–45)
PCO2 BLD: 49 MM HG (ref 35–45)
PCO2 BLD: 54 MM HG (ref 35–45)
PH BLD: 7.32 PH (ref 7.35–7.45)
PH BLD: 7.36 PH (ref 7.35–7.45)
PH BLD: 7.44 PH (ref 7.35–7.45)
PH BLD: 7.44 PH (ref 7.35–7.45)
PH UR STRIP: 5 PH (ref 5–7)
PHOSPHATE SERPL-MCNC: 4.8 MG/DL (ref 2.5–4.5)
PHOSPHATE SERPL-MCNC: 5.2 MG/DL (ref 2.5–4.5)
PLATELET # BLD AUTO: 100 10E9/L (ref 150–450)
PLATELET # BLD AUTO: 116 10E9/L (ref 150–450)
PLATELET # BLD AUTO: 119 10E9/L (ref 150–450)
PO2 BLD: 160 MM HG (ref 80–105)
PO2 BLD: 168 MM HG (ref 80–105)
PO2 BLD: 315 MM HG (ref 80–105)
PO2 BLD: 93 MM HG (ref 80–105)
POTASSIUM SERPL-SCNC: 4.3 MMOL/L (ref 3.4–5.3)
POTASSIUM SERPL-SCNC: 4.6 MMOL/L (ref 3.4–5.3)
POTASSIUM SERPL-SCNC: 4.8 MMOL/L (ref 3.4–5.3)
RBC # BLD AUTO: 3.37 10E12/L (ref 4.4–5.9)
RBC # BLD AUTO: 3.46 10E12/L (ref 4.4–5.9)
RBC # BLD AUTO: 3.59 10E12/L (ref 4.4–5.9)
RBC #/AREA URNS AUTO: 39 /HPF (ref 0–2)
SODIUM SERPL-SCNC: 132 MMOL/L (ref 133–144)
SODIUM SERPL-SCNC: 132 MMOL/L (ref 133–144)
SODIUM SERPL-SCNC: 134 MMOL/L (ref 133–144)
SOURCE: ABNORMAL
SP GR UR STRIP: 1.02 (ref 1–1.03)
SPECIMEN SOURCE: NORMAL
UROBILINOGEN UR STRIP-MCNC: NORMAL MG/DL (ref 0–2)
WBC # BLD AUTO: 18.5 10E9/L (ref 4–11)
WBC # BLD AUTO: 19.1 10E9/L (ref 4–11)
WBC # BLD AUTO: 19.7 10E9/L (ref 4–11)
WBC #/AREA URNS AUTO: 6 /HPF (ref 0–5)

## 2021-01-25 PROCEDURE — 71045 X-RAY EXAM CHEST 1 VIEW: CPT | Mod: 26 | Performed by: STUDENT IN AN ORGANIZED HEALTH CARE EDUCATION/TRAINING PROGRAM

## 2021-01-25 PROCEDURE — 250N000011 HC RX IP 250 OP 636

## 2021-01-25 PROCEDURE — 93010 ELECTROCARDIOGRAM REPORT: CPT | Performed by: INTERNAL MEDICINE

## 2021-01-25 PROCEDURE — 92960 CARDIOVERSION ELECTRIC EXT: CPT | Mod: GC | Performed by: ANESTHESIOLOGY

## 2021-01-25 PROCEDURE — 80048 BASIC METABOLIC PNL TOTAL CA: CPT | Performed by: SURGERY

## 2021-01-25 PROCEDURE — 258N000003 HC RX IP 258 OP 636: Performed by: NURSE ANESTHETIST, CERTIFIED REGISTERED

## 2021-01-25 PROCEDURE — 36415 COLL VENOUS BLD VENIPUNCTURE: CPT | Performed by: PHYSICIAN ASSISTANT

## 2021-01-25 PROCEDURE — 82803 BLOOD GASES ANY COMBINATION: CPT | Performed by: STUDENT IN AN ORGANIZED HEALTH CARE EDUCATION/TRAINING PROGRAM

## 2021-01-25 PROCEDURE — 83735 ASSAY OF MAGNESIUM: CPT | Performed by: SURGERY

## 2021-01-25 PROCEDURE — 250N000009 HC RX 250: Performed by: NURSE ANESTHETIST, CERTIFIED REGISTERED

## 2021-01-25 PROCEDURE — 999N000157 HC STATISTIC RCP TIME EA 10 MIN

## 2021-01-25 PROCEDURE — 250N000009 HC RX 250: Performed by: STUDENT IN AN ORGANIZED HEALTH CARE EDUCATION/TRAINING PROGRAM

## 2021-01-25 PROCEDURE — 999N000065 XR CHEST PORT 1 VW

## 2021-01-25 PROCEDURE — 82803 BLOOD GASES ANY COMBINATION: CPT | Performed by: SURGERY

## 2021-01-25 PROCEDURE — 250N000013 HC RX MED GY IP 250 OP 250 PS 637: Performed by: STUDENT IN AN ORGANIZED HEALTH CARE EDUCATION/TRAINING PROGRAM

## 2021-01-25 PROCEDURE — 5A2204Z RESTORATION OF CARDIAC RHYTHM, SINGLE: ICD-10-PCS | Performed by: ANESTHESIOLOGY

## 2021-01-25 PROCEDURE — 999N000011 HC STATISTIC ANESTHESIA CASE

## 2021-01-25 PROCEDURE — 93005 ELECTROCARDIOGRAM TRACING: CPT

## 2021-01-25 PROCEDURE — 83605 ASSAY OF LACTIC ACID: CPT | Performed by: SURGERY

## 2021-01-25 PROCEDURE — 250N000011 HC RX IP 250 OP 636: Performed by: STUDENT IN AN ORGANIZED HEALTH CARE EDUCATION/TRAINING PROGRAM

## 2021-01-25 PROCEDURE — 99223 1ST HOSP IP/OBS HIGH 75: CPT | Performed by: INTERNAL MEDICINE

## 2021-01-25 PROCEDURE — 250N000011 HC RX IP 250 OP 636: Performed by: SURGERY

## 2021-01-25 PROCEDURE — 85025 COMPLETE CBC W/AUTO DIFF WBC: CPT | Performed by: NURSE PRACTITIONER

## 2021-01-25 PROCEDURE — 250N000013 HC RX MED GY IP 250 OP 250 PS 637: Performed by: PHYSICIAN ASSISTANT

## 2021-01-25 PROCEDURE — 94002 VENT MGMT INPAT INIT DAY: CPT

## 2021-01-25 PROCEDURE — 250N000009 HC RX 250: Performed by: SURGERY

## 2021-01-25 PROCEDURE — 84100 ASSAY OF PHOSPHORUS: CPT | Performed by: PHYSICIAN ASSISTANT

## 2021-01-25 PROCEDURE — 999N000208 ECHOCARDIOGRAM COMPLETE

## 2021-01-25 PROCEDURE — 85027 COMPLETE CBC AUTOMATED: CPT | Performed by: PHYSICIAN ASSISTANT

## 2021-01-25 PROCEDURE — 258N000003 HC RX IP 258 OP 636: Performed by: ANESTHESIOLOGY

## 2021-01-25 PROCEDURE — 93306 TTE W/DOPPLER COMPLETE: CPT | Mod: 26 | Performed by: INTERNAL MEDICINE

## 2021-01-25 PROCEDURE — 83735 ASSAY OF MAGNESIUM: CPT | Performed by: PHYSICIAN ASSISTANT

## 2021-01-25 PROCEDURE — 71045 X-RAY EXAM CHEST 1 VIEW: CPT

## 2021-01-25 PROCEDURE — 87040 BLOOD CULTURE FOR BACTERIA: CPT | Performed by: NURSE PRACTITIONER

## 2021-01-25 PROCEDURE — P9041 ALBUMIN (HUMAN),5%, 50ML: HCPCS | Performed by: SURGERY

## 2021-01-25 PROCEDURE — 87070 CULTURE OTHR SPECIMN AEROBIC: CPT | Performed by: NURSE PRACTITIONER

## 2021-01-25 PROCEDURE — 80048 BASIC METABOLIC PNL TOTAL CA: CPT | Performed by: PHYSICIAN ASSISTANT

## 2021-01-25 PROCEDURE — 250N000013 HC RX MED GY IP 250 OP 250 PS 637: Performed by: NURSE PRACTITIONER

## 2021-01-25 PROCEDURE — 258N000003 HC RX IP 258 OP 636: Performed by: STUDENT IN AN ORGANIZED HEALTH CARE EDUCATION/TRAINING PROGRAM

## 2021-01-25 PROCEDURE — 36556 INSERT NON-TUNNEL CV CATH: CPT | Mod: GC | Performed by: ANESTHESIOLOGY

## 2021-01-25 PROCEDURE — 999N000065 XR ABDOMEN PORT 1 VW

## 2021-01-25 PROCEDURE — 80048 BASIC METABOLIC PNL TOTAL CA: CPT | Performed by: NURSE PRACTITIONER

## 2021-01-25 PROCEDURE — 71045 X-RAY EXAM CHEST 1 VIEW: CPT | Mod: 26 | Performed by: RADIOLOGY

## 2021-01-25 PROCEDURE — 250N000011 HC RX IP 250 OP 636: Performed by: NURSE PRACTITIONER

## 2021-01-25 PROCEDURE — 999N001017 HC STATISTIC GLUCOSE BY METER IP

## 2021-01-25 PROCEDURE — 258N000003 HC RX IP 258 OP 636: Performed by: SURGERY

## 2021-01-25 PROCEDURE — 250N000013 HC RX MED GY IP 250 OP 250 PS 637: Performed by: SURGERY

## 2021-01-25 PROCEDURE — 85027 COMPLETE CBC AUTOMATED: CPT | Performed by: SURGERY

## 2021-01-25 PROCEDURE — 200N000002 HC R&B ICU UMMC

## 2021-01-25 PROCEDURE — 84100 ASSAY OF PHOSPHORUS: CPT | Performed by: SURGERY

## 2021-01-25 PROCEDURE — 36415 COLL VENOUS BLD VENIPUNCTURE: CPT | Performed by: NURSE PRACTITIONER

## 2021-01-25 PROCEDURE — 250N000011 HC RX IP 250 OP 636: Performed by: NURSE ANESTHETIST, CERTIFIED REGISTERED

## 2021-01-25 PROCEDURE — 87205 SMEAR GRAM STAIN: CPT | Performed by: NURSE PRACTITIONER

## 2021-01-25 PROCEDURE — 255N000002 HC RX 255 OP 636: Performed by: INTERNAL MEDICINE

## 2021-01-25 PROCEDURE — 74018 RADEX ABDOMEN 1 VIEW: CPT | Mod: 26 | Performed by: STUDENT IN AN ORGANIZED HEALTH CARE EDUCATION/TRAINING PROGRAM

## 2021-01-25 PROCEDURE — 250N000011 HC RX IP 250 OP 636: Performed by: ANESTHESIOLOGY

## 2021-01-25 PROCEDURE — 81001 URINALYSIS AUTO W/SCOPE: CPT | Performed by: NURSE PRACTITIONER

## 2021-01-25 RX ORDER — NICOTINE POLACRILEX 4 MG
15-30 LOZENGE BUCCAL
Status: DISCONTINUED | OUTPATIENT
Start: 2021-01-25 | End: 2021-01-25

## 2021-01-25 RX ORDER — NOREPINEPHRINE BITARTRATE 0.06 MG/ML
0.03-0.4 INJECTION, SOLUTION INTRAVENOUS CONTINUOUS
Status: DISCONTINUED | OUTPATIENT
Start: 2021-01-25 | End: 2021-01-28

## 2021-01-25 RX ORDER — IPRATROPIUM BROMIDE AND ALBUTEROL SULFATE 2.5; .5 MG/3ML; MG/3ML
3 SOLUTION RESPIRATORY (INHALATION) EVERY 4 HOURS PRN
Status: DISCONTINUED | OUTPATIENT
Start: 2021-01-25 | End: 2021-02-01 | Stop reason: HOSPADM

## 2021-01-25 RX ORDER — PROPOFOL 10 MG/ML
INJECTION, EMULSION INTRAVENOUS
Status: COMPLETED
Start: 2021-01-25 | End: 2021-01-25

## 2021-01-25 RX ORDER — ALBUMIN, HUMAN INJ 5% 5 %
250 SOLUTION INTRAVENOUS ONCE
Status: COMPLETED | OUTPATIENT
Start: 2021-01-25 | End: 2021-01-25

## 2021-01-25 RX ORDER — PROPOFOL 10 MG/ML
5-75 INJECTION, EMULSION INTRAVENOUS CONTINUOUS
Status: DISCONTINUED | OUTPATIENT
Start: 2021-01-25 | End: 2021-01-27

## 2021-01-25 RX ORDER — DEXTROSE MONOHYDRATE 25 G/50ML
25-50 INJECTION, SOLUTION INTRAVENOUS
Status: DISCONTINUED | OUTPATIENT
Start: 2021-01-25 | End: 2021-01-25

## 2021-01-25 RX ORDER — AMIODARONE HYDROCHLORIDE 200 MG/1
200 TABLET ORAL 2 TIMES DAILY
Status: DISCONTINUED | OUTPATIENT
Start: 2021-01-25 | End: 2021-01-27

## 2021-01-25 RX ORDER — ETOMIDATE 2 MG/ML
INJECTION INTRAVENOUS
Status: COMPLETED | OUTPATIENT
Start: 2021-01-25 | End: 2021-01-25

## 2021-01-25 RX ADMIN — ATORVASTATIN CALCIUM 40 MG: 40 TABLET, FILM COATED ORAL at 07:57

## 2021-01-25 RX ADMIN — MUPIROCIN 0.5 G: 20 OINTMENT TOPICAL at 21:31

## 2021-01-25 RX ADMIN — POLYETHYLENE GLYCOL 3350 17 G: 17 POWDER, FOR SOLUTION ORAL at 07:57

## 2021-01-25 RX ADMIN — VANCOMYCIN HYDROCHLORIDE 1750 MG: 10 INJECTION, POWDER, LYOPHILIZED, FOR SOLUTION INTRAVENOUS at 10:26

## 2021-01-25 RX ADMIN — HUMAN ALBUMIN MICROSPHERES AND PERFLUTREN 6 ML: 10; .22 INJECTION, SOLUTION INTRAVENOUS at 10:00

## 2021-01-25 RX ADMIN — CEFEPIME HYDROCHLORIDE 2 G: 2 INJECTION, POWDER, FOR SOLUTION INTRAVENOUS at 11:24

## 2021-01-25 RX ADMIN — Medication 0.03 MCG/KG/MIN: at 10:32

## 2021-01-25 RX ADMIN — PROPOFOL 30 MCG/KG/MIN: 10 INJECTION, EMULSION INTRAVENOUS at 10:14

## 2021-01-25 RX ADMIN — AMIODARONE HYDROCHLORIDE 1 MG/MIN: 50 INJECTION, SOLUTION INTRAVENOUS at 10:05

## 2021-01-25 RX ADMIN — GABAPENTIN 300 MG: 300 CAPSULE ORAL at 07:56

## 2021-01-25 RX ADMIN — PHENYLEPHRINE HYDROCHLORIDE 100 MCG: 10 INJECTION INTRAVENOUS at 08:45

## 2021-01-25 RX ADMIN — THIAMINE HCL TAB 100 MG 100 MG: 100 TAB at 21:29

## 2021-01-25 RX ADMIN — AMIODARONE HYDROCHLORIDE 200 MG: 200 TABLET ORAL at 21:30

## 2021-01-25 RX ADMIN — AMIODARONE HYDROCHLORIDE 1 MG/MIN: 50 INJECTION, SOLUTION INTRAVENOUS at 12:55

## 2021-01-25 RX ADMIN — Medication 50 MCG/HR: at 11:00

## 2021-01-25 RX ADMIN — OXYCODONE HYDROCHLORIDE 10 MG: 5 TABLET ORAL at 06:06

## 2021-01-25 RX ADMIN — OXYCODONE HYDROCHLORIDE 10 MG: 5 TABLET ORAL at 03:08

## 2021-01-25 RX ADMIN — FOLIC ACID 1 MG: 5 INJECTION, SOLUTION INTRAMUSCULAR; INTRAVENOUS; SUBCUTANEOUS at 08:01

## 2021-01-25 RX ADMIN — DOCUSATE SODIUM AND SENNOSIDES 2 TABLET: 8.6; 5 TABLET, FILM COATED ORAL at 21:30

## 2021-01-25 RX ADMIN — VANCOMYCIN HYDROCHLORIDE 1750 MG: 10 INJECTION, POWDER, LYOPHILIZED, FOR SOLUTION INTRAVENOUS at 22:23

## 2021-01-25 RX ADMIN — FUROSEMIDE 60 MG: 10 INJECTION, SOLUTION INTRAMUSCULAR; INTRAVENOUS at 07:56

## 2021-01-25 RX ADMIN — ROCURONIUM BROMIDE 100 MG: 10 INJECTION INTRAVENOUS at 08:42

## 2021-01-25 RX ADMIN — CEFEPIME HYDROCHLORIDE 2 G: 2 INJECTION, POWDER, FOR SOLUTION INTRAVENOUS at 17:34

## 2021-01-25 RX ADMIN — PANTOPRAZOLE SODIUM 40 MG: 40 TABLET, DELAYED RELEASE ORAL at 07:57

## 2021-01-25 RX ADMIN — Medication 10 MG: at 08:42

## 2021-01-25 RX ADMIN — DOCUSATE SODIUM AND SENNOSIDES 2 TABLET: 8.6; 5 TABLET, FILM COATED ORAL at 07:56

## 2021-01-25 RX ADMIN — ACETAMINOPHEN 975 MG: 325 TABLET, FILM COATED ORAL at 06:05

## 2021-01-25 RX ADMIN — HEPARIN SODIUM 5000 UNITS: 5000 INJECTION, SOLUTION INTRAVENOUS; SUBCUTANEOUS at 21:31

## 2021-01-25 RX ADMIN — MULTIPLE VITAMINS W/ MINERALS TAB 1 TABLET: TAB at 07:57

## 2021-01-25 RX ADMIN — OXYCODONE HYDROCHLORIDE 10 MG: 5 TABLET ORAL at 09:00

## 2021-01-25 RX ADMIN — ASPIRIN 325 MG ORAL TABLET 325 MG: 325 PILL ORAL at 07:56

## 2021-01-25 RX ADMIN — HEPARIN SODIUM 5000 UNITS: 5000 INJECTION, SOLUTION INTRAVENOUS; SUBCUTANEOUS at 11:33

## 2021-01-25 RX ADMIN — PHENYLEPHRINE HYDROCHLORIDE 200 MCG: 10 INJECTION INTRAVENOUS at 08:47

## 2021-01-25 RX ADMIN — MIDAZOLAM 2 MG: 1 INJECTION INTRAMUSCULAR; INTRAVENOUS at 09:24

## 2021-01-25 RX ADMIN — PROPOFOL 15 MCG/KG/MIN: 10 INJECTION, EMULSION INTRAVENOUS at 17:35

## 2021-01-25 RX ADMIN — PHENYLEPHRINE HYDROCHLORIDE 100 MCG: 10 INJECTION INTRAVENOUS at 08:42

## 2021-01-25 RX ADMIN — HEPARIN SODIUM 5000 UNITS: 5000 INJECTION, SOLUTION INTRAVENOUS; SUBCUTANEOUS at 03:15

## 2021-01-25 RX ADMIN — ALBUMIN HUMAN 250 ML: 0.05 INJECTION, SOLUTION INTRAVENOUS at 11:07

## 2021-01-25 RX ADMIN — THIAMINE HYDROCHLORIDE 200 MG: 100 INJECTION, SOLUTION INTRAMUSCULAR; INTRAVENOUS at 08:01

## 2021-01-25 RX ADMIN — INSULIN GLARGINE 14 UNITS: 100 INJECTION, SOLUTION SUBCUTANEOUS at 08:14

## 2021-01-25 RX ADMIN — Medication 12.5 MG: at 07:56

## 2021-01-25 ASSESSMENT — ACTIVITIES OF DAILY LIVING (ADL)
ADLS_ACUITY_SCORE: 16
ADLS_ACUITY_SCORE: 15

## 2021-01-25 NOTE — PLAN OF CARE
Report called to Justina THOMPSON on 4E. Pt with increasing O2 needs. IS and Acapella done multiple times, poor tolerance. Incisional pain, PRN Oxycodone 10mg given PO x1. Turn q2hrs. Float float transporting pt on monitor. Belongings sent with patient.

## 2021-01-25 NOTE — PROCEDURES
[facility]   Procedure Note: Cardioversion   Date: January 25, 2021  50 year old male s/p cardiac surgery who developed wide complex tachycardia and hypotension requiring urgent cardioversion. Amiodarone was started but was unsuccessful with regard to rate control. Amio continued throughout the procedure.   No consent due to urgent nature.  Pads were placed and attached to the defibrillator. Anesthesia was present as backup for possible intubation if he developed respiratory failure. Versed (2mg) was used for sedation.   A single shock of 120J was administered with successful conversion to sinus, but reversion to prior rhythm, second cardioversion with 150 J again with same result, and finally third shock with 200 J was successful without return to prior rhtyhm.   EP stat consult felt EKG was suggestive of afib with aberrance.   He tolerated the procedure.  Required intubation afterwards.      Shai Maynard III, MD  Resident    January 25, 2021

## 2021-01-25 NOTE — ANESTHESIA CARE TRANSFER NOTE
Patient: Jaxon Melendez    * No procedures listed *    Diagnosis: * No pre-op diagnosis entered *  Diagnosis Additional Information: No value filed.    Anesthesia Type:   No value filed.     Note:    Oropharynx: endotracheal tube in place  Level of Consciousness: unresponsive      Independent Airway: airway patency not satisfactory and stable  Dentition: dentition unchanged    Report to RN Given: handoff report given  Patient transferred to: ICU  Comments: Sedation being ordered by ICU team. Patient unstable EKG- starting pressors as needed. ICU team at bedside performing care.  ICU Handoff: Call for PAUSE to initiate/utilize ICU HANDOFF, Identified Patient, Identified Responsible Provider, Reviewed the Pertinent Medical History, Discussed Surgical Course, Reviewed Intra-OP Anesthesia Management and Issues during Anesthesia, Set Expectations for Post Procedure Period and Allowed Opportunity for Questions and Acknowledgement of Understanding      Vitals: (Last set prior to Anesthesia Care Transfer)  CRNA VITALS  1/25/2021 0834 - 1/25/2021 0904      1/25/2021             NIBP:  --     Variable. See ICU documentation.        Electronically Signed By: LOUIS Man CRNA  January 25, 2021  9:04 AM

## 2021-01-25 NOTE — CONSULTS
Electrophysiology Consultation Note   EP Attending: .   Reason for consultation: WCT tachycardia.   Provider requesting consultation: Ms. Phu CNP CVTS Service.  Date of Service: 1/25/2021      HPI:   Mr. Melendez is a 50 year old male who has a past medical history significant for HLD, HTN, DM II, CAD s/p PCI to RCA and LCx in 2010, hidradenitis suppurativa in the presacral area, ETOH abuse/use, and tobacco abuse.   He has a known prior cardiac history of CAD and has PCI to RCA and LCx in 2010 at a younger age. He most recently developed retrosternal chest pain excerbated by exertion, relieved with rest. Troponins were elevated 25-69. Coronary angiogram showed severe multi vessel disease including in-stent restenosis. His LVEF was approximately 10%. Due to potential need for extensive mechanical circulatory support he was referred to Dr. Gibbons. He elected to pursue CABG. He had pre-op IABP okcaed, intra-op cardioversion x1, and then uneventful CABG x 3. IABP removed POD1. He was extubated 1/25/21. While he was up in chair today, he developed wide complex, unstable tachycardia. He had 3 cardioversions, IV amiodarone bolus. He ultimately became hypoxic and required reintubation and placement of A-line. He has hyponatremia, normal renal function, and some leucocytosis. Echo today shows LVEF 27%, normal RV size/function, and dilated IVC. Current cardiac medications include: ASA, Lipitor, Amiodarone, and Leveophed.    Past Medical History:   Past Medical History:   Diagnosis Date     Acute systolic heart failure (H)     EF 10%     CAD (coronary artery disease)      Diabetes mellitus, type 2 (H)     uncontrolled     Essential hypertension      Hidradenitis suppurativa     presacral area     Hyperlipidemia LDL goal <100      NSTEMI (non-ST elevated myocardial infarction) (H)      Past Surgical History:   Past Surgical History:   Procedure Laterality Date     ANGIOGRAM      2010, 2021     CV INTRA-AORTIC BALLOON  PUMP INSERTION N/A 1/21/2021    Procedure: CV INTRA-AORTIC BALLOON PUMP INSERTION;  Surgeon: Dustin Nuñez MD;  Location:  [facility]     CV SWAN RENETTA PROCEDURE N/A 1/21/2021    Procedure: [place] [facility] Procedure;  Surgeon: Dustin Nuñez MD;  Location:  [facility]     Allergies: Per MAR   No Known Allergies  Medications:   Per MAR current outpatient cardiovascular medications include:   Medications Prior to Admission   Medication Sig Dispense Refill Last Dose     Acetaminophen (TYLENOL) 325 MG CAPS Take 325-650 mg by mouth every 4 hours as needed        aspirin 81 MG EC tablet Take 81 mg by mouth every morning         atorvastatin (LIPITOR) 40 MG tablet Take 40 mg by mouth every morning         clopidogrel (PLAVIX) 75 MG tablet Take 75 mg by mouth every morning Pt states he is not taking this medication.  CTRN  1/21/21        dulaglutide (TRULICITY) 0.75 MG/0.5ML pen Inject 0.75 mg Subcutaneous every 7 days Pt is unaware of this med.  He is not taking.        insulin glargine (LANTUS PEN) 100 UNIT/ML pen Inject 25 Units Subcutaneous every evening         insulin lispro (HUMALOG KWIKPEN) 100 UNIT/ML (1 unit dial) KWIKPEN Inject 10 Units Subcutaneous 3 times daily (before meals)        lisinopril (ZESTRIL) 5 MG tablet Take 10 mg by mouth every morning         metFORMIN (GLUCOPHAGE) 500 MG tablet Take 500 mg by mouth daily (with breakfast)        metoprolol succinate ER (TOPROL-XL) 25 MG 24 hr tablet Take 50 mg by mouth every morning         nitroGLYcerin (NITROSTAT) 0.4 MG sublingual tablet Place 0.4 mg under the tongue every 5 minutes as needed for chest pain For chest pain place 1 tablet under the tongue every 5 minutes for 3 doses. If symptoms persist 5 minutes after 1st dose call 911.        No current outpatient medications on file.     Current Facility-Administered Medications   Medication Dose Route Frequency     amiodarone         aspirin  325 mg Oral  Daily     atorvastatin  40 mg Oral QAM     ceFEPIme (MAXIPIME) IV  2 g Intravenous Q8H     [START ON 1/26/2021] folic acid  1 mg Oral Daily     heparin ANTICOAGULANT  5,000 Units Subcutaneous Q8H     insulin aspart  1-12 Units Subcutaneous Q4H     multivitamin w/minerals  1 tablet Oral Daily     mupirocin  0.5 g Both Nostrils BID     pantoprazole  40 mg Oral QAM AC     polyethylene glycol  17 g Oral Daily     propofol         senna-docusate  1 tablet Oral BID    Or     senna-docusate  2 tablet Oral BID     sodium chloride (PF)  3 mL Intracatheter Q8H     sodium chloride (PF)  3 mL Intracatheter Q8H     thiamine  200 mg Intravenous TID     thiamine  100 mg Oral TID     [START ON 1/31/2021] thiamine  100 mg Oral Daily     vancomycin (VANCOCIN) IV  1,750 mg Intravenous Q12H     Family History:   Family History   Problem Relation Age of Onset     Brain Tumor Mother      Heart Disease Other      Diabetes Other      Social History:   Social History     Tobacco Use     Smoking status: Current Every Day Smoker     Packs/day: 1.00     Years: 25.00     Pack years: 25.00     Types: Cigarettes     Smokeless tobacco: Never Used     Tobacco comment: Now down to 1/2 PPD smoking   Substance Use Topics     Alcohol use: Yes     Comment: 6-7 mixed drinks nightly       ROS:   Unable to obtain 2/2 to patient's status.   Physical Examination:   VITALS: BP 91/55   Pulse 56   Temp 97.8  F (36.6  C) (Oral)   Resp 16   Wt 89.1 kg (196 lb 6.9 oz)   SpO2 100%   BMI 28.18 kg/m    GENERAL APPEARANCE: Intubated sedated.   HEENT: NCAT, EOMI, MMM. PERRLA.   NECK: Supple.   CHEST: Intubated, good air flow.   CARDIOVASCULAR: S1S2, Reg.   ABDOMEN: BS+, soft.  EXTREMITIES: 2+ BLE edema.   NEURO: Grossly nonfocal.   PSYCH: Normal affect.  SKIN: Warm and dry.   Data:   Labs:  BMP  Recent Labs   Lab 01/25/21  0852 01/25/21  0251 01/24/21  0419 01/23/21  1354 01/23/21  0355   * 134 136  --  142   POTASSIUM 4.3 4.6 4.8 4.5 4.5   CHLORIDE 99 100  107  --  111*   RICHARD 9.2 8.9 8.7  --  8.8   CO2 26 28 26  --  25   BUN 38* 32* 22  --  11   CR 0.83 0.88 0.75  --  0.75   * 101* 148*  --  117*     CBC  Recent Labs   Lab 21  0852 21  0251 21  0419 21  0355   WBC 18.5* 19.1* 37.1* 26.3*   RBC 3.46* 3.37* 3.57* 3.61*   HGB 10.9* 10.5* 11.4* 11.4*   HCT 34.4* 33.3* 36.3* 34.9*   MCV 99 99 102* 97   MCH 31.5 31.2 31.9 31.6   MCHC 31.7 31.5 31.4* 32.7   RDW 12.3 12.4 12.9 12.5   * 100* 121* 134*     INR  Recent Labs   Lab 21  1446 21  1306 21  0355 21  1416   INR 1.50* 1.54* 1.19* 1.22*     EK/2021 ECHO:   Interpretation Summary  Technically difficult study.Extremely poor acoustic windows.  Limited information was obtained during study.  LVEF 27% based on biplane 2D tracing.  Right ventricular function, chamber size, wall motion, and thickness are  normal.  Dilation of the inferior vena cava is present with abnormal respiratory  variation in diameter.  No pericardial effusion is present.  There is no prior study for direct comparison.  Assessment:   Mr. Melendez is a 50 year old male who has a past medical history significant for HLD, HTN, DM II, CAD s/p PCI to RCA and LCx in , hidradenitis suppurativa in the presacral area, ETOH abuse/use, and tobacco abuse.   He has a known prior cardiac history of CAD and has PCI to RCA and LCx in 2010 at a younger age. He most recently developed retrosternal chest pain excerbated by exertion, relieved with rest. Troponins were elevated 25-69. Coronary angiogram showed severe multi vessel disease including in-stent restenosis. His LVEF was approximately 10%. Due to potential need for extensive mechanical circulatory support he was referred to Dr. Gibbons. He elected to pursue CABG. He had pre-op IABP okcaed, intra-op cardioversion x1, and then uneventful CABG x 3. IABP removed POD1. He was extubated 21. While he was up in chair today, he developed wide  complex, unstable tachycardia. He had 3 cardioversions, IV amiodarone bolus. He ultimately became hypoxic and required reintubation and placement of A-line. He has hyponatremia, normal renal function, and some leucocytosis. Echo today shows LVEF 27%, normal RV size/function, and dilated IVC. Current cardiac medications include: ASA, Lipitor, Amiodarone, and Leveophed.  EP Recommendations:  ICM LVEF 27%, NYHA III  Wide Complex Tachycardia:  1. ACEi/ARB: will need to start when more recovered.  2. BB: resume when more recovered    3. Aldosterone antagonist: deferred during medication optimzation.  4. SCD prophylaxis: if LVEF remains reduced <35% despite GMDT and revascularization. Will need to follow-up with echo 90 days after discharge.    5. Fluid status: diuresis per primary team.   6. WCT is irregular and at times would slow slightly and narrow down, pointing towards AF with aberrancy. He had DCCV x3 at bedside by primary team. Agree with short course of amiodarone for 8 weeks post operatively. CHADSVASC 4 will need anticoagulation as possible per primary team.    The patient states understanding and is agreeable with plan.   Thank you for allowing us to participate in the care of this patient.     The patient was discussed w/ Dr. Adrian.  The above note reflects our joint plan.    LOUIS Hernandes CNP  Electrophysiology Consult Service  Pager: [phone]    EP STAFF NOTE  I have seen and examined the patient as part of a shared visit with NATA Hernandes NP.  I agree with the note above. I reviewed today's vital signs and medications. I have reviewed and discussed with the advanced practice provider their physical examination, assessment, and plan   Briefly, WCT, irregular, not affecting BP too much  My key history/exam findings are: irregular, intubated.   The key management decisions made by me: AF, DCCV.    Cleve Adrian MD [facility]  Cardiology - Electrophysiology

## 2021-01-25 NOTE — PHARMACY-VANCOMYCIN DOSING SERVICE
Pharmacy Vancomycin Initial Note  Date of Service 2021  Patient's  1970  50 year old, male    Indication: Aspiration Pneumonia    Current estimated CrCl = Estimated Creatinine Clearance: 114 mL/min (based on SCr of 0.88 mg/dL).    Creatinine for last 3 days  2021:  2:46 PM Creatinine 0.82 mg/dL; 10:06 PM Creatinine 0.78 mg/dL  2021:  3:55 AM Creatinine 0.75 mg/dL  2021:  4:19 AM Creatinine 0.75 mg/dL  2021:  2:51 AM Creatinine 0.88 mg/dL    Recent Vancomycin Level(s) for last 3 days  No results found for requested labs within last 72 hours.      Vancomycin IV Administrations (past 72 hours)                   vancomycin 1500 mg in 0.9% NaCl 250 ml intermittent infusion 1,500 mg (mg) 1,500 mg Given 21 0930                Nephrotoxins and other renal medications (From now, onward)    Start     Dose/Rate Route Frequency Ordered Stop    21  norepinephrine (LEVOPHED) 16 mg in  mL infusion CENTRAL LINE      0.03-0.4 mcg/kg/min × 92.1 kg (Dosing Weight)  2.6-34.5 mL/hr  Intravenous CONTINUOUS 21 0910      21 0930  vancomycin (VANCOCIN) 1,750 mg in sodium chloride 0.9 % 250 mL intermittent infusion      1,750 mg  over 90 Minutes Intravenous EVERY 12 HOURS 21 0913            Contrast Orders - past 72 hours (72h ago, onward)    None                Plan:  1.  Start vancomycin  1750 mg IV q12h  2.  Goal Trough Level: 15-20 mg/L   3.  Pharmacy will check trough levels as appropriate in 1-3 Days.    4. Serum creatinine levels will be ordered daily for the first week of therapy and at least twice weekly for subsequent weeks.    5. [place] method utilized to dose vancomycin therapy: Method 2    Dallin Ordoñez [facility]

## 2021-01-25 NOTE — ANESTHESIA PROCEDURE NOTES
Airway   Date/Time: 1/25/2021 8:42 AM   Patient location during procedure: ICU  Staff -   Anesthesiologist:  Didi Martel MD  CRNA: Mily Muir APRN CRNA  Other Anesthesia Staff: Joy Osorio APRN CRNA  Performed By: SHEMAR  Referred By: Didi Martel    Consent for Airway   Urgency: emergentConsent: The procedure was performed in an emergent situation.    Report Obtained from Primary Care Team  History regarding most recent potassium obtained: Yes  History regarding presence/absence of renal failure obtained:Yes  History regarding stroke/CVA obtained:Yes  History regarding presence/absence of NM disorder:Yes    Indications and Patient Condition  Indications for airway management: airway protection and hemodynamic instability  Mallampati: Not AssessedInduction type:intravenousMask difficulty assessment: 0 - not attempted    Final Airway Details  Final airway type: endotracheal airway  Successful airway:ETT - single  Endotracheal Airway Details   ETT size (mm): 8.0  Cuffed: yes  Successful intubation technique: video laryngoscopy  Grade View of Cords: 2  Adjucts: stylet  Secured with: commercial tube orosco  Bite block used: None    Post intubation assessment   ETT secured, Vent settings by primary/ICU team, Primary/ICU team to review CXR, Sedation to be ordered by primary/ICU team and No apparent complications  Placement verified by: capnometry, equal breath sounds and chest rise   Number of attempts at approach: 1  Number of other approaches attempted: 0  Secured with:commercial tube orosco  Ease of procedure: easy  Dentition: Intact    Medications Administered  Etomidate (AMIDATE) injection, 10 mg  rocuronium (ZEMURON) 10 mg/mL injection, 100 mg  Additional Comments  Anesthesia stat to 4E. Patient post-CAB. In unstable rhythm- cardioversion occurring as anesthesia arrived at bedside. 2mg versed administered prior to cardioversion. Dr. Martel requesting intubation. Dr. Martel at head of bed so proceeded  with airway management. DLX1- D-Blade- Grade 2 view. 8.0 ETT passed. +ETC02.

## 2021-01-25 NOTE — PROCEDURES
PREPROCEDURE DIAGNOSIS: hypotension   POSTPROCEDURE DIAGNOSIS: same   PROCEDURE: arterial line   ATTENDING: Didi Martel  ANESTHESIA: midazolam 2 mg  EBL: 5cc   FINDINGS: Arterial line in right radial artery with good waveform   COMPLICATIONS: None apparent   SPECIMEN: none   OPERATIVE INDICATIONS: Jaxon Melendez is a 50 year old male s/p CABG who developed unstable tachycardia requiring atrial artery catheterization for blood pressure monitoring and lab draws.     OPERATIVE PROCEDURE:   No consent due to emergent nature.   The patient's anatomical landmarks were reviewed prior to the procedure.   The patient was positioned in supine position with the right wrist was prepped in the supine position and the nurse monitored vital signs, and conscious sedation was administered with a total of 2mg of versed.  The patient was preped and draped in the usual sterile fashion. A strong radial pulse was palpated and visualized with ultrasound. The right radial artery was cannulated. A wire was passed through the needle. The introducer needle was removed over the wire. An 11 blade was used to open the skin by the wire. The arterial line was inserted over the wire. The arterial line was attached to the monitor. The arterial line was sutured to the skin. A good waveform was demostrated. A sterile dressing was placed over the arterial line.   Dr. Martel was present for all parts of this case.     Shai Maynard III, MD   Resident

## 2021-01-25 NOTE — PLAN OF CARE
PT 4E: CANCEL: Pt not medically appropriate due to medical status and now currently intubated and sedated. Will reschedule.

## 2021-01-25 NOTE — PROGRESS NOTES
[facility], [place]  Procedure Note           Intubation:       Jaxon Melendez  MRN# [id number]   January 25, 2021,8:48 AM Indication: Hemodynamic instability           Patient intubated at: January 25, 2021, 8:48 AM   Patient informed of: Why intubation was required   Informed consent: Obtained   Cervical spine: Was stabilized during the procedure   Sedative medication: Was administered during the procedure   Technique used: Fiberoptic visualization with GlideScope   Endotracheal tube size: 8.0 cm with cuff   Number of attempts: 1   Placement confirmed by: Auscultation of bilateral breath sounds  Visualization of bilateral chest wall rise  End-tidal CO2 monitor   ET tube repositioning: Was performed   Tube secured at: 23 cm @ lip      This procedure was performed without difficulty and he tolerated the procedure well with no complications.      Recorded by Kristina Allen

## 2021-01-25 NOTE — PLAN OF CARE
Admitted/transferred from:    Reason for admission/transfer: increasing oxygen needs  Patient status upon admission/transfer: on BIPAP with sats>90%  Interventions: scheduled lasix given  Plan: Monitor respiratory status, encourage IS  2 RN skin assessment: completed by DONNA Roberto and DONNA Lux  Result of skin assessment and interventions/actions: Chest incision covered by wound vac, Left leg incision sites, Wounds on sacrum  Height, weight, drug calc weight: done  Patient belongings: cell phone, clothes, bag of hygiene items.

## 2021-01-25 NOTE — PLAN OF CARE
Neuro: Pt A&Ox4. Moves all extremities. Pt c/o incision and back pain - PRN Oxycodone given x3.     Cardiac: SR. HR 60s. MAPs>65, SBP<140.     Respiratory: On 5L Oxymask. BIPAP on at 40-50% from 7746-4053 with ~2hr break from BIPAP during. Lung sounds clear/diminished. Frequent pulmonary hygiene encouraged despite pt resistance.      GI/: Tolerating regular diet. LBM prior to admission. Urine output 30-75ml/hr; good urine response to scheduled lasix.     Incisons: Chest incision wound vac CDI. Chest tubes x3 with  dark red output present.    Lines: PIVx2     No family called for update overnight.   Continue to monitor and update MD as needed. Continue plan of care.       Problem: Adult Inpatient Plan of Care  Goal: Plan of Care Review  Outcome: Improving

## 2021-01-25 NOTE — PROCEDURES
UROLOGY PROCEDURAL NOTE     Urology was called to assist with catheter placement for Jaxon Melendez as he was being prepped for procedure. He has a history of distal hypospadias and nursing was unsuccessful at initial attempts at catheter placement. After the patient was prepped and draped in standard fashion a mosquito was used to calibrate the meatus and a 14-Fr catheter was advance with no difficulty into the bladder with immediate return of clear yellow urine. 10 ml was then instilled in balloon and the catheter was attached to drainage.     Post-operative plan:   - Will defer catheter plan to primary team, no evidence of stricture or stenosis. If catheter needs to be replaced would recommend use of 14-Fr padron catheter. Catheter does not need to be replaced by Urology.   - Urology will sign off please contact resident/PA on call with any questions or concerns

## 2021-01-25 NOTE — PROCEDURES
BEDSIDE PROCEDURE - CENTRAL LINE    Date of Procedure: 01/25/2021     Patient: Jaxon Melendez   MRN: [id number]     RESIDENT: Bebe Elmore MD PGY3    Staff: Tahmina    LOCATION OF CENTRAL LINE: right internal jugular vein       SEDATION: Propofol       ESTIMATED BLOOD LOSS: Minimal       COMPLICATIONS: None.        INDICATIONS FOR PROCEDURE: Central venous access    DESCRIPTION OF PROCEDURE:  The risks, benefits and alternatives were described to the patient s family, and they were willing to proceed; consent was obtained verbally.   The patient was placed in a dependent position appropriate for central line placement based on the vein to be cannulated. The patient s right neck was prepped and draped in sterile fashion. Each lumen of a triple lumen 9-Ugandan catheter was evacuated of air prior to insertion and flushed with sterile saline. The catheter was introduced into the right internal jugular vein using the Seldinger technique with serial dilation and under ultrasound guidance. The catheter was threaded smoothly over the guide wire and appropriate blood return was obtained. The catheter was advanced to the 20 cm jim. The catheter was then sutured in place to the skin and a sterile dressing with biopatch applied. CXR was obtained showing no pneumothorax and position within the right atrium. The site was re-prepped and draped, and the catheter was withdrawn 5 cm. Chest XR pending. Dr. Martel was immediately available for the entire procedure.

## 2021-01-25 NOTE — PROGRESS NOTES
CV ICU PROGRESS NOTE  January 25, 2021      CO-MORBIDITIES:   CAD, multiple vessel  (primary encounter diagnosis)  CAD (coronary artery disease)    ASSESSMENT: Jaxon Melendez is a 50 year old male with PMH of HLD, HTN, DM II, tobacco use, heavy alcohol use, ICM with 10-20% EF, CAD s/p PCI to RCA and LCx in 2010, found to have triple vessel disease. Pre-op IABP, intra-op cardioversion x1, uneventful CABG x 3. IABP removed POD1, planning for extubation today.     TODAY'S PROGRESS:   - developed wide complex, unstable tachycardia requiring cardioversion x3, intubation, and placement of invasive lines  - start antibiotics and sepsis work-up    PLAN:  Neuro/ pain/ sedation:  # Acute post-operative pain  Heavy alcohol use  - propofol / fentanyl sedation   - stop CIWA  - thiamine and folate  - gabapentin / hydromorphone / oxycodone / APAP    Pulmonary care:   Re intubated 01/25 for acute hypoxic respiratory failure in setting of unstable tachycardia  - ABG, adjust vent as needed  - Titrate FiO2 for SpO2 >92%     Cardiovascular:    ICM, low EF  CAD  HTN  - IABP 1/22 overnight, removed 01/23  - Monitor hemodynamic status.   -  mg / atorvastatin 40 mg   - MAP goal > 65  Rapid, wide complex tachycardia  cardioversion x 3 01/25/21, amiodarone bolus  - EP consult  - amiodarone infusion 1 mg --> 0.5 mg this afternoon        GI /Nutrition:   - NPO   - place OG  - Bowel regimen with senna BID     Fluids/ Electrolytes/ Renal:   - Blackwell for strict I&Os  - holding lasix for now with concern for pending sepsis, was -1.6 L yesterday     Endocrine:    Stress hyperglycemia  DM2  PTA lantus (25 units)   ISS  - holding lantus 12 untis     ID/ Antibiotics:  Concern for aspiration pneumonia 01/24 overnight   - start cefepime and vancomycin   - pan culture   - Perioperative antibiotics with cefazolin, complete  - CTM leukocytosis      Heme:     # Acute blood loss anemia  - Hgb goal >7  - subcutaneous heparin      :  Hypospadias  -  padron placed by urology   - Continue padron today      Prophylaxis:    - SCDs  - Bowel regimen  - PPI  - SQH      Lines/ tubes/ drains:  Will consider removing chest tubes 01/26, holding off today due to instability   - CVC  - Arterial line  - Padron  - Mediastinal tube x2  - Pleural tube x1 (left)     Disposition:  - CV ICU.     Patient seen, findings and plan discussed with CV ICU staff    Shai Maynard III, MD   Resident PGY3    ====================================    SUBJECTIVE:   Transferred back from floor for increasing oxygen needs  Placed on BiPAP overnight briefly     OBJECTIVE:   1. VITAL SIGNS:   Temp:  [97.5  F (36.4  C)-98  F (36.7  C)] 97.8  F (36.6  C)  Pulse:  [59-85] 64  Resp:  [12-36] 17  BP: ()/(51-70) 107/59  MAP:  [62 mmHg-102 mmHg] 75 mmHg  Arterial Line BP: ()/(44-81) 122/56  FiO2 (%):  [40 %-60 %] 40 %  SpO2:  [86 %-99 %] 91 %  FiO2 (%): 40 %  Resp: 17      2. INTAKE/ OUTPUT:   I/O last 3 completed shifts:  In: 1285.98 [P.O.:1020; I.V.:265.98]  Out: 2305 [Urine:1745; Chest Tube:560]    3. PHYSICAL EXAMINATION:   General: adult male, resting in bed, alter  Neuro: A&Ox3, no tremor. Moving all extemities  Resp: Breathing non-labored on NC  CV: RRR  Abdomen: Soft, Non-distended, Non-tender  Incisions: Vac holding good suction   Extremities: warm and well perfused    4. INVESTIGATIONS:   Arterial Blood Gases   Recent Labs   Lab 01/23/21  2202 01/23/21  1525 01/23/21  0348 01/22/21 2206   PH 7.34* 7.40 7.49* 7.41   PCO2 47* 42 35 41   PO2 91 80 95 123*   HCO3 25 26 27 26     Complete Blood Count   Recent Labs   Lab 01/25/21  0251 01/24/21  0419 01/23/21  0355 01/22/21 2206   WBC 19.1* 37.1* 26.3* 27.3*   HGB 10.5* 11.4* 11.4* 12.1*   * 121* 134* 179     Basic Metabolic Panel  Recent Labs   Lab 01/25/21  0251 01/24/21  0419 01/23/21  1354 01/23/21  0355 01/22/21 2206    136  --  142 143   POTASSIUM 4.6 4.8 4.5 4.5 3.4   CHLORIDE 100 107  --  111* 110*   CO2 28 26  --   25 24   BUN 32* 22  --  11 10   CR 0.88 0.75  --  0.75 0.78   * 148*  --  117* 209*     Liver Function Tests  Recent Labs   Lab 01/23/21  0355 01/22/21  1446 01/22/21  1306 01/22/21  0355 01/19/21  1416   AST 21 19  --   --  8   ALT 10 12  --   --  14   ALKPHOS 62 87  --   --  100   BILITOTAL 0.9 0.8  --   --  0.7   ALBUMIN 3.1* 3.0*  --   --  3.2*   INR  --  1.50* 1.54* 1.19* 1.22*     Pancreatic Enzymes  No lab results found in last 7 days.  Coagulation Profile  Recent Labs   Lab 01/22/21  1446 01/22/21  1306 01/22/21  0355 01/19/21  1416   INR 1.50* 1.54* 1.19* 1.22*   PTT 36 31  --  30         5. RADIOLOGY:   Recent Results (from the past 24 hour(s))   XR Chest Port 1 View    Narrative    EXAM: XR CHEST PORT 1 VW  1/24/2021 5:40 PM     HISTORY:  increasing O2 needs       COMPARISON:  Chest x-ray same day    FINDINGS:   Interval removal of right IJ Sigel-Renetta catheter. Postsurgical changes  of CABG with sternotomy wires place. Unchanged appearance of  mediastinal drains. Left basilar chest tube appears unchanged.  Redemonstration of enlarged cardiac silhouette. Slight increase in  bilateral mixed airspace/interstitial opacities. Increased left  pleural effusion. No pneumothorax.      Impression    IMPRESSION:   1. Redemonstration of borderline enlarged cardiac silhouette with  slight increase in bilateral mixed airspace/interstitial opacities,  suggestive of worsening pulmonary edema versus infection.  2. Increased left pleural effusion.  3. Tubes as above. Right IJ Sigel-Renetta catheter has been removed.    I have personally reviewed the examination and initial interpretation  and I agree with the findings.    MARIELA CUELLAR MD   XR Chest Port 1 View    Narrative    Exam: XR CHEST PORT 1 VW, 1/25/2021 1:13 AM    Indication: s.p CAB 1/22, eval volume status, lines, tubes    Comparison: 1/24/2021 chest x-ray    Findings:   Semiupright AP view of the chest. Median sternotomy wires are intact.  Left basilar  chest tube is unchanged in position. Mediastinal drains  are stable in position.    Low lung volumes. Cardiac silhouette is enlarged. Mild pulmonary  venous congestion. More prominent airspace opacities in the right  middle and lower lobes. No left lung opacities are not significantly  changed. No significant pleural effusion. No pneumothorax. Osseous  structures and upper abdomen are unremarkable..      Impression    Impression:   1.  More prominent airspace opacities in the right middle and lower  lobes, likely representative of infection, with unchanged pulmonary  venous congestion.  2.  Persistent left lung subsegmental atelectasis and/or  consolidation.    I have personally reviewed the examination and initial interpretation  and I agree with the findings.    JEFF HURTADO MD       =========================================         None

## 2021-01-26 ENCOUNTER — APPOINTMENT (OUTPATIENT)
Dept: GENERAL RADIOLOGY | Facility: CLINIC | Age: 51
DRG: 235 | End: 2021-01-26
Attending: STUDENT IN AN ORGANIZED HEALTH CARE EDUCATION/TRAINING PROGRAM
Payer: COMMERCIAL

## 2021-01-26 LAB
ANION GAP SERPL CALCULATED.3IONS-SCNC: 8 MMOL/L (ref 3–14)
BASE EXCESS BLDA CALC-SCNC: 2.5 MMOL/L
BASE EXCESS BLDA CALC-SCNC: 3.2 MMOL/L
BASE EXCESS BLDA CALC-SCNC: 3.5 MMOL/L
BUN SERPL-MCNC: 33 MG/DL (ref 7–30)
CALCIUM SERPL-MCNC: 8.8 MG/DL (ref 8.5–10.1)
CHLORIDE SERPL-SCNC: 103 MMOL/L (ref 94–109)
CO2 SERPL-SCNC: 26 MMOL/L (ref 20–32)
CREAT SERPL-MCNC: 0.65 MG/DL (ref 0.66–1.25)
ERYTHROCYTE [DISTWIDTH] IN BLOOD BY AUTOMATED COUNT: 12.2 % (ref 10–15)
GFR SERPL CREATININE-BSD FRML MDRD: >90 ML/MIN/{1.73_M2}
GLUCOSE BLDC GLUCOMTR-MCNC: 120 MG/DL (ref 70–99)
GLUCOSE BLDC GLUCOMTR-MCNC: 124 MG/DL (ref 70–99)
GLUCOSE BLDC GLUCOMTR-MCNC: 127 MG/DL (ref 70–99)
GLUCOSE BLDC GLUCOMTR-MCNC: 137 MG/DL (ref 70–99)
GLUCOSE BLDC GLUCOMTR-MCNC: 139 MG/DL (ref 70–99)
GLUCOSE BLDC GLUCOMTR-MCNC: 147 MG/DL (ref 70–99)
GLUCOSE BLDC GLUCOMTR-MCNC: 215 MG/DL (ref 70–99)
GLUCOSE SERPL-MCNC: 119 MG/DL (ref 70–99)
HCO3 BLD-SCNC: 28 MMOL/L (ref 21–28)
HCT VFR BLD AUTO: 30.2 % (ref 40–53)
HGB BLD-MCNC: 10 G/DL (ref 13.3–17.7)
INTERPRETATION ECG - MUSE: NORMAL
INTERPRETATION ECG - MUSE: NORMAL
MAGNESIUM SERPL-MCNC: 1.9 MG/DL (ref 1.6–2.3)
MAGNESIUM SERPL-MCNC: 2.2 MG/DL (ref 1.6–2.3)
MCH RBC QN AUTO: 31.4 PG (ref 26.5–33)
MCHC RBC AUTO-ENTMCNC: 33.1 G/DL (ref 31.5–36.5)
MCV RBC AUTO: 95 FL (ref 78–100)
O2/TOTAL GAS SETTING VFR VENT: 40 %
O2/TOTAL GAS SETTING VFR VENT: 40 %
O2/TOTAL GAS SETTING VFR VENT: NORMAL %
PCO2 BLD: 43 MM HG (ref 35–45)
PCO2 BLD: 44 MM HG (ref 35–45)
PCO2 BLD: 45 MM HG (ref 35–45)
PH BLD: 7.4 PH (ref 7.35–7.45)
PH BLD: 7.42 PH (ref 7.35–7.45)
PH BLD: 7.43 PH (ref 7.35–7.45)
PHOSPHATE SERPL-MCNC: 3.3 MG/DL (ref 2.5–4.5)
PHOSPHATE SERPL-MCNC: 3.6 MG/DL (ref 2.5–4.5)
PLATELET # BLD AUTO: 146 10E9/L (ref 150–450)
PO2 BLD: 118 MM HG (ref 80–105)
PO2 BLD: 74 MM HG (ref 80–105)
PO2 BLD: 91 MM HG (ref 80–105)
POTASSIUM SERPL-SCNC: 3.5 MMOL/L (ref 3.4–5.3)
POTASSIUM SERPL-SCNC: 3.8 MMOL/L (ref 3.4–5.3)
RBC # BLD AUTO: 3.18 10E12/L (ref 4.4–5.9)
SODIUM SERPL-SCNC: 137 MMOL/L (ref 133–144)
VANCOMYCIN SERPL-MCNC: 17.2 MG/L
WBC # BLD AUTO: 13.6 10E9/L (ref 4–11)

## 2021-01-26 PROCEDURE — 93010 ELECTROCARDIOGRAM REPORT: CPT | Performed by: INTERNAL MEDICINE

## 2021-01-26 PROCEDURE — 80048 BASIC METABOLIC PNL TOTAL CA: CPT | Performed by: SURGERY

## 2021-01-26 PROCEDURE — 93005 ELECTROCARDIOGRAM TRACING: CPT

## 2021-01-26 PROCEDURE — 258N000003 HC RX IP 258 OP 636: Performed by: SURGERY

## 2021-01-26 PROCEDURE — 94003 VENT MGMT INPAT SUBQ DAY: CPT

## 2021-01-26 PROCEDURE — 250N000013 HC RX MED GY IP 250 OP 250 PS 637: Performed by: PHYSICIAN ASSISTANT

## 2021-01-26 PROCEDURE — 250N000013 HC RX MED GY IP 250 OP 250 PS 637: Performed by: SURGERY

## 2021-01-26 PROCEDURE — 71045 X-RAY EXAM CHEST 1 VIEW: CPT

## 2021-01-26 PROCEDURE — 250N000009 HC RX 250: Performed by: STUDENT IN AN ORGANIZED HEALTH CARE EDUCATION/TRAINING PROGRAM

## 2021-01-26 PROCEDURE — 250N000011 HC RX IP 250 OP 636: Performed by: STUDENT IN AN ORGANIZED HEALTH CARE EDUCATION/TRAINING PROGRAM

## 2021-01-26 PROCEDURE — 84132 ASSAY OF SERUM POTASSIUM: CPT | Performed by: SURGERY

## 2021-01-26 PROCEDURE — 999N000157 HC STATISTIC RCP TIME EA 10 MIN

## 2021-01-26 PROCEDURE — 80202 ASSAY OF VANCOMYCIN: CPT | Performed by: SURGERY

## 2021-01-26 PROCEDURE — 258N000003 HC RX IP 258 OP 636: Performed by: STUDENT IN AN ORGANIZED HEALTH CARE EDUCATION/TRAINING PROGRAM

## 2021-01-26 PROCEDURE — 84100 ASSAY OF PHOSPHORUS: CPT | Performed by: SURGERY

## 2021-01-26 PROCEDURE — 250N000013 HC RX MED GY IP 250 OP 250 PS 637: Performed by: STUDENT IN AN ORGANIZED HEALTH CARE EDUCATION/TRAINING PROGRAM

## 2021-01-26 PROCEDURE — 200N000002 HC R&B ICU UMMC

## 2021-01-26 PROCEDURE — 82803 BLOOD GASES ANY COMBINATION: CPT | Performed by: STUDENT IN AN ORGANIZED HEALTH CARE EDUCATION/TRAINING PROGRAM

## 2021-01-26 PROCEDURE — 999N001017 HC STATISTIC GLUCOSE BY METER IP

## 2021-01-26 PROCEDURE — 71045 X-RAY EXAM CHEST 1 VIEW: CPT | Mod: 26 | Performed by: RADIOLOGY

## 2021-01-26 PROCEDURE — 83735 ASSAY OF MAGNESIUM: CPT | Performed by: SURGERY

## 2021-01-26 PROCEDURE — 250N000013 HC RX MED GY IP 250 OP 250 PS 637: Performed by: NURSE PRACTITIONER

## 2021-01-26 PROCEDURE — 250N000011 HC RX IP 250 OP 636: Performed by: SURGERY

## 2021-01-26 PROCEDURE — 85027 COMPLETE CBC AUTOMATED: CPT | Performed by: SURGERY

## 2021-01-26 PROCEDURE — 250N000011 HC RX IP 250 OP 636: Performed by: NURSE PRACTITIONER

## 2021-01-26 RX ORDER — POTASSIUM CHLORIDE 750 MG/1
20 TABLET, EXTENDED RELEASE ORAL ONCE
Status: COMPLETED | OUTPATIENT
Start: 2021-01-26 | End: 2021-01-26

## 2021-01-26 RX ORDER — POTASSIUM CHLORIDE 1.5 G/1.58G
20 POWDER, FOR SOLUTION ORAL ONCE
Status: COMPLETED | OUTPATIENT
Start: 2021-01-26 | End: 2021-01-26

## 2021-01-26 RX ORDER — LORAZEPAM 2 MG/ML
1-2 INJECTION INTRAMUSCULAR EVERY 30 MIN PRN
Status: DISCONTINUED | OUTPATIENT
Start: 2021-01-26 | End: 2021-01-28

## 2021-01-26 RX ORDER — LORAZEPAM 0.5 MG/1
1-2 TABLET ORAL EVERY 30 MIN PRN
Status: DISCONTINUED | OUTPATIENT
Start: 2021-01-26 | End: 2021-01-28

## 2021-01-26 RX ORDER — PHENAZOPYRIDINE HYDROCHLORIDE 100 MG/1
100 TABLET, FILM COATED ORAL
Status: DISPENSED | OUTPATIENT
Start: 2021-01-26 | End: 2021-01-29

## 2021-01-26 RX ORDER — FLUMAZENIL 0.1 MG/ML
0.2 INJECTION, SOLUTION INTRAVENOUS
Status: DISCONTINUED | OUTPATIENT
Start: 2021-01-26 | End: 2021-02-01 | Stop reason: HOSPADM

## 2021-01-26 RX ORDER — DEXMEDETOMIDINE HYDROCHLORIDE 4 UG/ML
0.2-0.7 INJECTION, SOLUTION INTRAVENOUS CONTINUOUS
Status: DISCONTINUED | OUTPATIENT
Start: 2021-01-26 | End: 2021-01-27

## 2021-01-26 RX ORDER — MAGNESIUM SULFATE HEPTAHYDRATE 40 MG/ML
2 INJECTION, SOLUTION INTRAVENOUS ONCE
Status: COMPLETED | OUTPATIENT
Start: 2021-01-26 | End: 2021-01-26

## 2021-01-26 RX ORDER — FUROSEMIDE 10 MG/ML
60 INJECTION INTRAMUSCULAR; INTRAVENOUS EVERY 6 HOURS
Status: COMPLETED | OUTPATIENT
Start: 2021-01-26 | End: 2021-01-27

## 2021-01-26 RX ADMIN — CEFEPIME HYDROCHLORIDE 2 G: 2 INJECTION, POWDER, FOR SOLUTION INTRAVENOUS at 17:15

## 2021-01-26 RX ADMIN — AMIODARONE HYDROCHLORIDE 200 MG: 200 TABLET ORAL at 19:42

## 2021-01-26 RX ADMIN — ACETAMINOPHEN 650 MG: 325 TABLET, FILM COATED ORAL at 22:06

## 2021-01-26 RX ADMIN — HEPARIN SODIUM 5000 UNITS: 5000 INJECTION, SOLUTION INTRAVENOUS; SUBCUTANEOUS at 03:50

## 2021-01-26 RX ADMIN — OXYCODONE HYDROCHLORIDE 5 MG: 5 TABLET ORAL at 15:20

## 2021-01-26 RX ADMIN — PHENAZOPYRIDINE HYDROCHLORIDE 100 MG: 100 TABLET, FILM COATED ORAL at 17:11

## 2021-01-26 RX ADMIN — ASPIRIN 325 MG ORAL TABLET 325 MG: 325 PILL ORAL at 08:48

## 2021-01-26 RX ADMIN — POLYETHYLENE GLYCOL 3350 17 G: 17 POWDER, FOR SOLUTION ORAL at 08:48

## 2021-01-26 RX ADMIN — DOCUSATE SODIUM AND SENNOSIDES 2 TABLET: 8.6; 5 TABLET, FILM COATED ORAL at 08:48

## 2021-01-26 RX ADMIN — MAGNESIUM SULFATE IN WATER 2 G: 40 INJECTION, SOLUTION INTRAVENOUS at 17:12

## 2021-01-26 RX ADMIN — FOLIC ACID 1 MG: 1 TABLET ORAL at 08:48

## 2021-01-26 RX ADMIN — THIAMINE HCL TAB 100 MG 100 MG: 100 TAB at 08:48

## 2021-01-26 RX ADMIN — AMIODARONE HYDROCHLORIDE 200 MG: 200 TABLET ORAL at 08:48

## 2021-01-26 RX ADMIN — POTASSIUM CHLORIDE 20 MEQ: 750 TABLET, EXTENDED RELEASE ORAL at 17:11

## 2021-01-26 RX ADMIN — ATORVASTATIN CALCIUM 40 MG: 40 TABLET, FILM COATED ORAL at 08:48

## 2021-01-26 RX ADMIN — MUPIROCIN 0.5 G: 20 OINTMENT TOPICAL at 19:43

## 2021-01-26 RX ADMIN — FUROSEMIDE 60 MG: 10 INJECTION, SOLUTION INTRAMUSCULAR; INTRAVENOUS at 11:39

## 2021-01-26 RX ADMIN — HYDROMORPHONE HYDROCHLORIDE 0.5 MG: 1 INJECTION, SOLUTION INTRAMUSCULAR; INTRAVENOUS; SUBCUTANEOUS at 17:49

## 2021-01-26 RX ADMIN — MULTIPLE VITAMINS W/ MINERALS TAB 1 TABLET: TAB at 08:48

## 2021-01-26 RX ADMIN — HYDROMORPHONE HYDROCHLORIDE 0.5 MG: 1 INJECTION, SOLUTION INTRAMUSCULAR; INTRAVENOUS; SUBCUTANEOUS at 22:44

## 2021-01-26 RX ADMIN — DEXMEDETOMIDINE 0.4 MCG/KG/HR: 100 INJECTION, SOLUTION, CONCENTRATE INTRAVENOUS at 08:43

## 2021-01-26 RX ADMIN — POTASSIUM CHLORIDE 20 MEQ: 1.5 POWDER, FOR SOLUTION ORAL at 05:42

## 2021-01-26 RX ADMIN — ACETAMINOPHEN 650 MG: 325 TABLET, FILM COATED ORAL at 15:20

## 2021-01-26 RX ADMIN — CEFEPIME HYDROCHLORIDE 2 G: 2 INJECTION, POWDER, FOR SOLUTION INTRAVENOUS at 09:55

## 2021-01-26 RX ADMIN — CEFEPIME HYDROCHLORIDE 2 G: 2 INJECTION, POWDER, FOR SOLUTION INTRAVENOUS at 01:40

## 2021-01-26 RX ADMIN — VANCOMYCIN HYDROCHLORIDE 1750 MG: 10 INJECTION, POWDER, LYOPHILIZED, FOR SOLUTION INTRAVENOUS at 10:37

## 2021-01-26 RX ADMIN — OXYCODONE HYDROCHLORIDE 10 MG: 5 TABLET ORAL at 19:42

## 2021-01-26 RX ADMIN — THIAMINE HCL TAB 100 MG 100 MG: 100 TAB at 15:20

## 2021-01-26 RX ADMIN — HEPARIN SODIUM 5000 UNITS: 5000 INJECTION, SOLUTION INTRAVENOUS; SUBCUTANEOUS at 19:43

## 2021-01-26 RX ADMIN — PROPOFOL 15 MCG/KG/MIN: 10 INJECTION, EMULSION INTRAVENOUS at 05:40

## 2021-01-26 RX ADMIN — MUPIROCIN 0.5 G: 20 OINTMENT TOPICAL at 08:54

## 2021-01-26 RX ADMIN — THIAMINE HCL TAB 100 MG 100 MG: 100 TAB at 19:42

## 2021-01-26 RX ADMIN — VANCOMYCIN HYDROCHLORIDE 1750 MG: 10 INJECTION, POWDER, LYOPHILIZED, FOR SOLUTION INTRAVENOUS at 22:06

## 2021-01-26 RX ADMIN — PANTOPRAZOLE SODIUM 40 MG: 40 TABLET, DELAYED RELEASE ORAL at 12:23

## 2021-01-26 RX ADMIN — FUROSEMIDE 60 MG: 10 INJECTION, SOLUTION INTRAMUSCULAR; INTRAVENOUS at 17:18

## 2021-01-26 RX ADMIN — HEPARIN SODIUM 5000 UNITS: 5000 INJECTION, SOLUTION INTRAVENOUS; SUBCUTANEOUS at 12:01

## 2021-01-26 ASSESSMENT — ACTIVITIES OF DAILY LIVING (ADL)
ADLS_ACUITY_SCORE: 16

## 2021-01-26 NOTE — PLAN OF CARE
Major Shift Events: Pt placed on pressure support at 0645 this morning. Propofol stopped, levo on standby. Patient tolerating fair, oxygenation is good but patient has enhanced gag reflex so he is uncomfortable. Fent gtt remains on.   Plan: Extubate today.    For vital signs and complete assessments, please see documentation flowsheets.

## 2021-01-26 NOTE — PROGRESS NOTES
CV ICU PROGRESS NOTE  January 26, 2021      CO-MORBIDITIES:   CAD, multiple vessel  (primary encounter diagnosis)  CAD (coronary artery disease)    ASSESSMENT: Jaxon Melendez is a 50 year old male with PMH of HLD, HTN, DM II, tobacco use, heavy alcohol use, ICM with 10-20% EF, CAD s/p PCI to RCA and LCx in 2010, found to have triple vessel disease. Pre-op IABP, intra-op cardioversion x1, uneventful CABG x 3. IABP removed POD1, planning for extubation today.     Today:       - Diuresis 60 mg TID Lasix, goal -1 L  - pressure support trial   - considering NJ if not extubated today       PLAN:  Neuro/ pain/ sedation:  # Acute post-operative pain  Heavy alcohol use  - Precedex / fentanyl sedation   - holding CIWA  - thiamine and folate  - gabapentin / hydromorphone / oxycodone / APAP    Pulmonary care:   Re intubated 01/25 for acute hypoxic respiratory failure in setting of unstable tachycardia.  Failed pressure support 01/26/21 AM due to hypoxia, will attempt again this PM.  - pressure support trial today, ABG during   - Titrate FiO2 for SpO2 >92%     Cardiovascular:    ICM, low EF  CAD  HTN  - IABP 1/22 overnight, removed 01/23  - Monitor hemodynamic status.   -  mg / atorvastatin 40 mg   - MAP goal > 65, on low dose NE  Rapid, wide complex tachycardia  cardioversion x 3 01/25/21, amiodarone bolus  - EP consult, recommends  amiodarone x 8 weeks  - amiodarone  mg BID until load complete --> 200 mg daily after load        GI /Nutrition:   - place NJ  - start tube feeds   - Bowel regimen with senna BID     Fluids/ Electrolytes/ Renal:   - Blackwell for strict I&Os  - held Lasix due to sepsis concern 01/25  - lasix diuresis 60 TID, -1 L goal     Endocrine:    Stress hyperglycemia  DM2  PTA lantus (25 units)   - ISS  - holding lantus 12 units    ID/ Antibiotics:  Concern for aspiration pneumonia 01/24 overnight. No ongoing signs of infection.   - cefepime and vancomycin 7 day course (01/25-  - pan culture -  Gram stain negative, NGTD on cultures, UA negative  - Perioperative antibiotics with cefazolin, complete     Heme:     # Acute blood loss anemia  - Hgb goal >7  - subcutaneous heparin      :  Hypospadias  - padron placed by urology   - Continue padron today      Prophylaxis:    - SCDs  - Bowel regimen  - PPI  - SQH      Lines/ tubes/ drains:  - CVC  - Arterial line  - Padron  - Chest tube x 3 (split 01/26/21)     Disposition:  - CV ICU.     Patient seen, findings and plan discussed with CV ICU staff    Shai Maynard III, MD   Resident PGY3    ====================================    SUBJECTIVE:   Failed pressure support this morning due to hypoxia    OBJECTIVE:   1. VITAL SIGNS:   Temp:  [98.3  F (36.8  C)-98.9  F (37.2  C)] 98.9  F (37.2  C)  Pulse:  [] 62  Resp:  [8-26] 15  BP: ()/(37-98) 130/66  MAP:  [60 mmHg-97 mmHg] 67 mmHg  Arterial Line BP: ()/(40-84) 107/51  FiO2 (%):  [40 %-100 %] 40 %  SpO2:  [91 %-100 %] 100 %  Ventilation Mode: CMV/AC  (Continuous Mandatory Ventilation/ Assist Control)  FiO2 (%): 40 %  Rate Set (breaths/minute): 16 breaths/min  Tidal Volume Set (mL): 540 mL  PEEP (cm H2O): 6 cmH2O  Oxygen Concentration (%): 40 %  Resp: 15      2. INTAKE/ OUTPUT:   I/O last 3 completed shifts:  In: 2234.12 [P.O.:660; I.V.:1264.12; NG/GT:60]  Out: 1985 [Urine:1145; Emesis/NG output:250; Chest Tube:590]    3. PHYSICAL EXAMINATION:   General: intubated and sedated  Neuro: sedated, moving all extremities   Resp: ventilated   CV: RRR  Abdomen: Soft, Non-distended, Non-tender  Incisions: c/d/i  Extremities: warm and well perfused    4. INVESTIGATIONS:   Arterial Blood Gases   Recent Labs   Lab 01/25/21  2146 01/25/21  1740 01/25/21  1145 01/25/21  0852   PH 7.44 7.44 7.36 7.32*   PCO2 44 43 49* 54*   PO2 160* 168* 315* 93   HCO3 30* 29* 28 28     Complete Blood Count   Recent Labs   Lab 01/26/21  0356 01/25/21  1016 01/25/21  0852 01/25/21  0251   WBC 13.6* 19.7* 18.5* 19.1*   HGB 10.0*  11.4* 10.9* 10.5*   * 119* 116* 100*     Basic Metabolic Panel  Recent Labs   Lab 21  0356 21  1016 21  0852 21  0251    132* 132* 134   POTASSIUM 3.8 4.8 4.3 4.6   CHLORIDE 103 98 99 100   CO2 26 28 26 28   BUN 33* 38* 38* 32*   CR 0.65* 0.81 0.83 0.88   * 178* 200* 101*     Liver Function Tests  Recent Labs   Lab 21  0355 21  1446 21  1306 21  0355 21  1416   AST 21 19  --   --  8   ALT 10 12  --   --  14   ALKPHOS 62 87  --   --  100   BILITOTAL 0.9 0.8  --   --  0.7   ALBUMIN 3.1* 3.0*  --   --  3.2*   INR  --  1.50* 1.54* 1.19* 1.22*     Pancreatic Enzymes  No lab results found in last 7 days.  Coagulation Profile  Recent Labs   Lab 21  1446 21  1306 21  0355 21  1416   INR 1.50* 1.54* 1.19* 1.22*   PTT 36 31  --  30         5. RADIOLOGY:   Recent Results (from the past 24 hour(s))   Echo Complete    Narrative    [id number]  [id number]  [id number]  [id number]^ROEL^TATO           [facility]  Echocardiography Laboratory  [address] [place] [id number]     Name: RACHEL KISER  MRN: [id number]  : 1970  Study Date: 2021 08:59 AM  Age: 50 yrs  Gender: Male  Patient Location: [facility]  Reason For Study: CABG, VT  History: CABGx3 (2021)  Ordering Physician: TATO SEVILLA  Referring Physician: LILLIAM JHAVERI  Performed By: [facility] Aliza Moctezuma     BSA: 2.1 m2  Height: 70 in  Weight: 196 lb  BP: 105/55 mmHg  _____________________________________________________________________________  __        Procedure  Complete Portable Echo Adult. Contrast Optison. Technically difficult  study.Extremely poor acoustic windows. Limited information was obtained during  study. Optison (NDC #3616-0868-08) given intravenously. Patient was given 6 ml  mixture of 3 ml Optison and 6 ml saline. 3 ml wasted. IV start location L  Hand  .  _____________________________________________________________________________  __        Interpretation Summary  Technically difficult study.Extremely poor acoustic windows.  Limited information was obtained during study.  LVEF 27% based on biplane 2D tracing.  Right ventricular function, chamber size, wall motion, and thickness are  normal.  Dilation of the inferior vena cava is present with abnormal respiratory  variation in diameter.  No pericardial effusion is present.  There is no prior study for direct comparison.  _____________________________________________________________________________  __        Left Ventricle  LVEF 27% based on biplane 2D tracing. Left ventricular wall thickness is  normal. Left ventricular size is normal. Left ventricular diastolic function  is indeterminate. Regional wall motion is probably normal.     Right Ventricle  Right ventricular function, chamber size, wall motion, and thickness are  normal.     Atria  The atria cannot be assessed.     Mitral Valve  The valve leaflets are not well visualized. Trace to mild mitral insufficiency  is present.        Aortic Valve  The valve leaflets are not well visualized. On Doppler interrogation, there is  no significant stenosis or regurgitation.     Tricuspid Valve  The valve leaflets are not well visualized. Trace to mild tricuspid  insufficiency is present. The right ventricular systolic pressure is  approximated at 16.2 mmHg plus the right atrial pressure.     Pulmonic Valve  The pulmonic valve cannot be assessed.     Vessels  The thoracic aorta cannot be assessed. The pulmonary artery cannot be  assessed. Dilation of the inferior vena cava is present with abnormal  respiratory variation in diameter.     Pericardium  No pericardial effusion is present.        Compared to Previous Study  There is no prior study for direct comparison.  _____________________________________________________________________________  __  MMode/2D  Measurements & Calculations     TAPSE: 0.92 cm        Doppler Measurements & Calculations  MV A max thuan: 46.9 cm/sec  MV dec time: 0.16 sec  TR max thuan: 201.0 cm/sec  TR max P.2 mmHg     _____________________________________________________________________________  __           Report approved by: Byron Escobar 2021 10:04 AM      XR Abdomen Port 1 View    Narrative    EXAMINATION:  XR ABDOMEN PORT 1 VW 2021 10:38 AM.    COMPARISON: 2021.    HISTORY:  Abd distention, OG placement    FINDINGS: Single portable supine view of the abdomen. Of note patient  is slightly rotated and a portion of the right abdomen is excluded  from field-of-view. Gastric tube tip and sidehole project over  expected location of the stomach. No abnormal air filled bowel.  Partially visualized mediastinal drains and left chest tube. Bibasilar  opacities in the lungs. Multilevel degenerative changes in the spine.      Impression    IMPRESSION: Gastric tube tip and sidehole project over stomach. No  abnormal air filled bowel.    JEFF HURTADO MD   XR Chest Port 1 View    Narrative    EXAMINATION:  XR CHEST PORT 1 VW 2021 10:39 AM.    COMPARISON: 2021 0048 hours    HISTORY:  ET Tube placement    FINDINGS: Portable supine view of the chest. Endotracheal tube tip  projects over the mid trachea. New right IJ central venous catheter  tip projecting over the high right atrium. Gastric tube sidehole  projects over the stomach. Stable positioning of the 2 mediastinal  drains. Pacer pack projects over the mid thorax. Postsurgical changes  of the chest with intact median sternotomy wires.    Midline trachea. The cardiomediastinal silhouette is enlarged, stable.  Small left pleural effusion. Bilateral diffuse patchy airspace  opacities, not significantly changed from prior. No pneumothorax. No  subcutaneous gas.      Impression    IMPRESSION:   1. Endotracheal tube tip projects 6.7 cm above the waldemar. Right IJ  central  venous catheter tip projects over the high right atrium.  2. No significant change in the diffuse mixed pulmonary opacities  which may be seen with ARDS, infection or pulmonary edema.  3. Stable cardiomegaly.    I have personally reviewed the examination and initial interpretation  and I agree with the findings.    MILAGRO MITCHELL MD   XR Chest Port 1 View    Narrative    Exam: XR CHEST PORT 1 VW, 1/25/2021 12:31 PM    Indication: et and central line placement    Comparison: [facility] chest radiograph    Findings:   Semiupright AP view of the chest. Median sternotomy wires are intact.  Left basilar chest tube in stable position. Enteric tube courses below  the inferior margin of the film. Mediastinal drains. Right IJ central  line tip projecting over mid SVC. Endotracheal tube tip projecting 3  cm above the waldemar.  Trachea is midline. Low lung volumes. Stable large cardiac silhouette.  Pulmonary vasculature engorged, indistinct. No significant change in  prominent airspace opacities in the right middle and lower lobe.  Streaky left lower lung predominant opacities. Visualized upper  abdomen and osseous structures unremarkable.      Impression    Impression:   1. No significant change in prominent airspace opacities in the right  middle and lower lobes, likely representative of infection, with  unchanged pulmonary venous congestion.  2. Persistent left lung subsegmental atelectasis and/or consolidation.  3. Stable position of support devices.    I have personally reviewed the examination and initial interpretation  and I agree with the findings.    SAMMY PEREIRA MD       =========================================

## 2021-01-26 NOTE — PROGRESS NOTES
Major Shift Events:  Pt went in to an unstable, wide complex while up in chair. Transferred to bed and required cardioversion x3, amio push 2x and drip, intubation and initiation of pressors. Antibiotics started and pan cultured. Lines placed. Now sedated in sinus landy, levo titrated to keep maps >65.   Plan: Extubate tomorrow.     For vital signs and complete assessments, please see documentation flowsheets.

## 2021-01-26 NOTE — PROGRESS NOTES
[facility], [place]  Procedure Note          Extubation:       Jaxon Melendez  MRN# [id number]   January 26, 2021, 2:05 PM         Patient extubated at: January 26, 2021, 2:05 PM   Supplemental Oxygen: Via nasal cannula at 4 liters per minute   Cough: The cough is strong   Secretion Mode: Able to clear   Secretion Amount: Small amount, moderately thick and creamy in color   Respiratory Exam:: Breath sounds: clear and diminished     Location: all lobes   Skin Exam:: Patient color: natural   Patient Status: Currently appears comfortable   Arterial Blood Gasses: pH Arterial (pH)   Date Value   01/26/2021 7.40     pO2 Arterial (mm Hg)   Date Value   01/26/2021 118 (H)     pCO2 Arterial (mm Hg)   Date Value   01/26/2021 45     Bicarbonate Arterial (mmol/L)   Date Value   01/26/2021 28            Recorded by Narciso Merida

## 2021-01-27 ENCOUNTER — APPOINTMENT (OUTPATIENT)
Dept: GENERAL RADIOLOGY | Facility: CLINIC | Age: 51
DRG: 235 | End: 2021-01-27
Attending: STUDENT IN AN ORGANIZED HEALTH CARE EDUCATION/TRAINING PROGRAM
Payer: COMMERCIAL

## 2021-01-27 ENCOUNTER — APPOINTMENT (OUTPATIENT)
Dept: GENERAL RADIOLOGY | Facility: CLINIC | Age: 51
DRG: 235 | End: 2021-01-27
Attending: NURSE PRACTITIONER
Payer: COMMERCIAL

## 2021-01-27 ENCOUNTER — APPOINTMENT (OUTPATIENT)
Dept: OCCUPATIONAL THERAPY | Facility: CLINIC | Age: 51
DRG: 235 | End: 2021-01-27
Attending: SURGERY
Payer: COMMERCIAL

## 2021-01-27 LAB
ANION GAP SERPL CALCULATED.3IONS-SCNC: 5 MMOL/L (ref 3–14)
ANION GAP SERPL CALCULATED.3IONS-SCNC: 7 MMOL/L (ref 3–14)
APTT PPP: 36 SEC (ref 22–37)
BACTERIA SPEC CULT: NORMAL
BUN SERPL-MCNC: 19 MG/DL (ref 7–30)
BUN SERPL-MCNC: 21 MG/DL (ref 7–30)
CALCIUM SERPL-MCNC: 8.2 MG/DL (ref 8.5–10.1)
CALCIUM SERPL-MCNC: 8.6 MG/DL (ref 8.5–10.1)
CHLORIDE SERPL-SCNC: 101 MMOL/L (ref 94–109)
CHLORIDE SERPL-SCNC: 97 MMOL/L (ref 94–109)
CO2 SERPL-SCNC: 28 MMOL/L (ref 20–32)
CO2 SERPL-SCNC: 30 MMOL/L (ref 20–32)
CREAT SERPL-MCNC: 0.58 MG/DL (ref 0.66–1.25)
CREAT SERPL-MCNC: 0.7 MG/DL (ref 0.66–1.25)
ERYTHROCYTE [DISTWIDTH] IN BLOOD BY AUTOMATED COUNT: 11.9 % (ref 10–15)
ERYTHROCYTE [DISTWIDTH] IN BLOOD BY AUTOMATED COUNT: 12 % (ref 10–15)
GFR SERPL CREATININE-BSD FRML MDRD: >90 ML/MIN/{1.73_M2}
GFR SERPL CREATININE-BSD FRML MDRD: >90 ML/MIN/{1.73_M2}
GLUCOSE BLDC GLUCOMTR-MCNC: 138 MG/DL (ref 70–99)
GLUCOSE BLDC GLUCOMTR-MCNC: 162 MG/DL (ref 70–99)
GLUCOSE BLDC GLUCOMTR-MCNC: 168 MG/DL (ref 70–99)
GLUCOSE BLDC GLUCOMTR-MCNC: 185 MG/DL (ref 70–99)
GLUCOSE BLDC GLUCOMTR-MCNC: 187 MG/DL (ref 70–99)
GLUCOSE BLDC GLUCOMTR-MCNC: 217 MG/DL (ref 70–99)
GLUCOSE SERPL-MCNC: 184 MG/DL (ref 70–99)
GLUCOSE SERPL-MCNC: 195 MG/DL (ref 70–99)
HCT VFR BLD AUTO: 30.6 % (ref 40–53)
HCT VFR BLD AUTO: 31.6 % (ref 40–53)
HGB BLD-MCNC: 10.1 G/DL (ref 13.3–17.7)
HGB BLD-MCNC: 10.5 G/DL (ref 13.3–17.7)
INR PPP: 1.24 (ref 0.86–1.14)
Lab: NORMAL
MAGNESIUM SERPL-MCNC: 2 MG/DL (ref 1.6–2.3)
MAGNESIUM SERPL-MCNC: 2.5 MG/DL (ref 1.6–2.3)
MAGNESIUM SERPL-MCNC: 2.7 MG/DL (ref 1.6–2.3)
MCH RBC QN AUTO: 31.3 PG (ref 26.5–33)
MCH RBC QN AUTO: 31.8 PG (ref 26.5–33)
MCHC RBC AUTO-ENTMCNC: 33 G/DL (ref 31.5–36.5)
MCHC RBC AUTO-ENTMCNC: 33.2 G/DL (ref 31.5–36.5)
MCV RBC AUTO: 94 FL (ref 78–100)
MCV RBC AUTO: 96 FL (ref 78–100)
PLATELET # BLD AUTO: 169 10E9/L (ref 150–450)
PLATELET # BLD AUTO: 175 10E9/L (ref 150–450)
POTASSIUM SERPL-SCNC: 3.2 MMOL/L (ref 3.4–5.3)
POTASSIUM SERPL-SCNC: 4.3 MMOL/L (ref 3.4–5.3)
POTASSIUM SERPL-SCNC: 4.4 MMOL/L (ref 3.4–5.3)
RBC # BLD AUTO: 3.18 10E12/L (ref 4.4–5.9)
RBC # BLD AUTO: 3.36 10E12/L (ref 4.4–5.9)
SODIUM SERPL-SCNC: 134 MMOL/L (ref 133–144)
SODIUM SERPL-SCNC: 135 MMOL/L (ref 133–144)
SPECIMEN SOURCE: NORMAL
UFH PPP CHRO-ACNC: 0.13 IU/ML
WBC # BLD AUTO: 9 10E9/L (ref 4–11)
WBC # BLD AUTO: 9.8 10E9/L (ref 4–11)

## 2021-01-27 PROCEDURE — 80048 BASIC METABOLIC PNL TOTAL CA: CPT | Performed by: NURSE PRACTITIONER

## 2021-01-27 PROCEDURE — 999N001017 HC STATISTIC GLUCOSE BY METER IP

## 2021-01-27 PROCEDURE — 250N000011 HC RX IP 250 OP 636: Performed by: SURGERY

## 2021-01-27 PROCEDURE — 999N000015 HC STATISTIC ARTERIAL MONITORING DAILY

## 2021-01-27 PROCEDURE — 999N000157 HC STATISTIC RCP TIME EA 10 MIN

## 2021-01-27 PROCEDURE — 97535 SELF CARE MNGMENT TRAINING: CPT | Mod: GO | Performed by: OCCUPATIONAL THERAPIST

## 2021-01-27 PROCEDURE — 250N000013 HC RX MED GY IP 250 OP 250 PS 637: Performed by: STUDENT IN AN ORGANIZED HEALTH CARE EDUCATION/TRAINING PROGRAM

## 2021-01-27 PROCEDURE — 71045 X-RAY EXAM CHEST 1 VIEW: CPT | Mod: 26 | Performed by: RADIOLOGY

## 2021-01-27 PROCEDURE — 80048 BASIC METABOLIC PNL TOTAL CA: CPT | Performed by: SURGERY

## 2021-01-27 PROCEDURE — 83735 ASSAY OF MAGNESIUM: CPT | Performed by: NURSE PRACTITIONER

## 2021-01-27 PROCEDURE — 250N000013 HC RX MED GY IP 250 OP 250 PS 637: Performed by: SURGERY

## 2021-01-27 PROCEDURE — 258N000003 HC RX IP 258 OP 636: Performed by: STUDENT IN AN ORGANIZED HEALTH CARE EDUCATION/TRAINING PROGRAM

## 2021-01-27 PROCEDURE — 71045 X-RAY EXAM CHEST 1 VIEW: CPT

## 2021-01-27 PROCEDURE — P9041 ALBUMIN (HUMAN),5%, 50ML: HCPCS | Performed by: SURGERY

## 2021-01-27 PROCEDURE — 250N000011 HC RX IP 250 OP 636: Performed by: STUDENT IN AN ORGANIZED HEALTH CARE EDUCATION/TRAINING PROGRAM

## 2021-01-27 PROCEDURE — 85610 PROTHROMBIN TIME: CPT | Performed by: NURSE PRACTITIONER

## 2021-01-27 PROCEDURE — 84132 ASSAY OF SERUM POTASSIUM: CPT | Performed by: NURSE PRACTITIONER

## 2021-01-27 PROCEDURE — 85027 COMPLETE CBC AUTOMATED: CPT | Performed by: SURGERY

## 2021-01-27 PROCEDURE — 85027 COMPLETE CBC AUTOMATED: CPT | Performed by: NURSE PRACTITIONER

## 2021-01-27 PROCEDURE — 999N000155 HC STATISTIC RAPCV CVP MONITORING

## 2021-01-27 PROCEDURE — 83735 ASSAY OF MAGNESIUM: CPT | Performed by: SURGERY

## 2021-01-27 PROCEDURE — 250N000011 HC RX IP 250 OP 636: Performed by: NURSE PRACTITIONER

## 2021-01-27 PROCEDURE — 250N000013 HC RX MED GY IP 250 OP 250 PS 637: Performed by: NURSE PRACTITIONER

## 2021-01-27 PROCEDURE — 85730 THROMBOPLASTIN TIME PARTIAL: CPT | Performed by: NURSE PRACTITIONER

## 2021-01-27 PROCEDURE — 250N000009 HC RX 250: Performed by: STUDENT IN AN ORGANIZED HEALTH CARE EDUCATION/TRAINING PROGRAM

## 2021-01-27 PROCEDURE — 200N000002 HC R&B ICU UMMC

## 2021-01-27 PROCEDURE — 71045 X-RAY EXAM CHEST 1 VIEW: CPT | Mod: 77

## 2021-01-27 PROCEDURE — 250N000011 HC RX IP 250 OP 636

## 2021-01-27 PROCEDURE — 85520 HEPARIN ASSAY: CPT | Performed by: NURSE PRACTITIONER

## 2021-01-27 PROCEDURE — 97530 THERAPEUTIC ACTIVITIES: CPT | Mod: GO | Performed by: OCCUPATIONAL THERAPIST

## 2021-01-27 PROCEDURE — 250N000013 HC RX MED GY IP 250 OP 250 PS 637: Performed by: PHYSICIAN ASSISTANT

## 2021-01-27 PROCEDURE — 93010 ELECTROCARDIOGRAM REPORT: CPT | Performed by: INTERNAL MEDICINE

## 2021-01-27 RX ORDER — HEPARIN SODIUM 10000 [USP'U]/100ML
0-5000 INJECTION, SOLUTION INTRAVENOUS CONTINUOUS
Status: DISCONTINUED | OUTPATIENT
Start: 2021-01-27 | End: 2021-01-30

## 2021-01-27 RX ORDER — WARFARIN SODIUM 3 MG/1
3 TABLET ORAL
Status: COMPLETED | OUTPATIENT
Start: 2021-01-27 | End: 2021-01-27

## 2021-01-27 RX ORDER — POTASSIUM CHLORIDE 750 MG/1
40 TABLET, EXTENDED RELEASE ORAL ONCE
Status: COMPLETED | OUTPATIENT
Start: 2021-01-27 | End: 2021-01-27

## 2021-01-27 RX ORDER — ALBUMIN, HUMAN INJ 5% 5 %
250 SOLUTION INTRAVENOUS ONCE
Status: COMPLETED | OUTPATIENT
Start: 2021-01-27 | End: 2021-01-27

## 2021-01-27 RX ORDER — LIDOCAINE 4 G/G
1 PATCH TOPICAL
Status: DISCONTINUED | OUTPATIENT
Start: 2021-01-27 | End: 2021-01-29

## 2021-01-27 RX ORDER — MAGNESIUM SULFATE HEPTAHYDRATE 40 MG/ML
2 INJECTION, SOLUTION INTRAVENOUS ONCE
Status: COMPLETED | OUTPATIENT
Start: 2021-01-27 | End: 2021-01-27

## 2021-01-27 RX ORDER — POTASSIUM CHLORIDE 29.8 MG/ML
20 INJECTION INTRAVENOUS ONCE
Status: COMPLETED | OUTPATIENT
Start: 2021-01-27 | End: 2021-01-27

## 2021-01-27 RX ORDER — FUROSEMIDE 10 MG/ML
60 INJECTION INTRAMUSCULAR; INTRAVENOUS
Status: DISCONTINUED | OUTPATIENT
Start: 2021-01-27 | End: 2021-01-28

## 2021-01-27 RX ORDER — KETOROLAC TROMETHAMINE 15 MG/ML
30 INJECTION, SOLUTION INTRAMUSCULAR; INTRAVENOUS EVERY 6 HOURS PRN
Status: DISCONTINUED | OUTPATIENT
Start: 2021-01-27 | End: 2021-01-28

## 2021-01-27 RX ORDER — METOPROLOL TARTRATE 1 MG/ML
5 INJECTION, SOLUTION INTRAVENOUS ONCE
Status: COMPLETED | OUTPATIENT
Start: 2021-01-27 | End: 2021-01-27

## 2021-01-27 RX ADMIN — PANTOPRAZOLE SODIUM 40 MG: 40 TABLET, DELAYED RELEASE ORAL at 08:01

## 2021-01-27 RX ADMIN — OXYCODONE HYDROCHLORIDE 10 MG: 5 TABLET ORAL at 00:36

## 2021-01-27 RX ADMIN — ACETAMINOPHEN 650 MG: 325 TABLET, FILM COATED ORAL at 06:05

## 2021-01-27 RX ADMIN — POTASSIUM CHLORIDE 40 MEQ: 750 TABLET, EXTENDED RELEASE ORAL at 06:05

## 2021-01-27 RX ADMIN — CEFEPIME HYDROCHLORIDE 2 G: 2 INJECTION, POWDER, FOR SOLUTION INTRAVENOUS at 09:32

## 2021-01-27 RX ADMIN — PHENAZOPYRIDINE HYDROCHLORIDE 100 MG: 100 TABLET, FILM COATED ORAL at 07:50

## 2021-01-27 RX ADMIN — HYDROMORPHONE HYDROCHLORIDE 0.5 MG: 1 INJECTION, SOLUTION INTRAMUSCULAR; INTRAVENOUS; SUBCUTANEOUS at 07:36

## 2021-01-27 RX ADMIN — METOPROLOL TARTRATE 5 MG: 5 INJECTION INTRAVENOUS at 07:26

## 2021-01-27 RX ADMIN — OXYCODONE HYDROCHLORIDE 10 MG: 5 TABLET ORAL at 04:31

## 2021-01-27 RX ADMIN — AMIODARONE HYDROCHLORIDE 150 MG: 1.5 INJECTION, SOLUTION INTRAVENOUS at 08:18

## 2021-01-27 RX ADMIN — MULTIPLE VITAMINS W/ MINERALS TAB 1 TABLET: TAB at 07:48

## 2021-01-27 RX ADMIN — KETOROLAC TROMETHAMINE 30 MG: 15 INJECTION, SOLUTION INTRAMUSCULAR; INTRAVENOUS at 10:04

## 2021-01-27 RX ADMIN — HEPARIN SODIUM 1100 UNITS/HR: 10000 INJECTION, SOLUTION INTRAVENOUS at 09:59

## 2021-01-27 RX ADMIN — FUROSEMIDE 60 MG: 10 INJECTION, SOLUTION INTRAMUSCULAR; INTRAVENOUS at 11:50

## 2021-01-27 RX ADMIN — THIAMINE HCL TAB 100 MG 100 MG: 100 TAB at 19:48

## 2021-01-27 RX ADMIN — OXYCODONE HYDROCHLORIDE 10 MG: 5 TABLET ORAL at 19:48

## 2021-01-27 RX ADMIN — CEFEPIME HYDROCHLORIDE 2 G: 2 INJECTION, POWDER, FOR SOLUTION INTRAVENOUS at 23:52

## 2021-01-27 RX ADMIN — FUROSEMIDE 60 MG: 10 INJECTION, SOLUTION INTRAMUSCULAR; INTRAVENOUS at 00:01

## 2021-01-27 RX ADMIN — KETOROLAC TROMETHAMINE 30 MG: 15 INJECTION, SOLUTION INTRAMUSCULAR; INTRAVENOUS at 16:01

## 2021-01-27 RX ADMIN — POTASSIUM CHLORIDE 20 MEQ: 29.8 INJECTION, SOLUTION INTRAVENOUS at 07:47

## 2021-01-27 RX ADMIN — DOCUSATE SODIUM 50 MG AND SENNOSIDES 8.6 MG 1 TABLET: 8.6; 5 TABLET, FILM COATED ORAL at 07:49

## 2021-01-27 RX ADMIN — WARFARIN SODIUM 3 MG: 3 TABLET ORAL at 18:00

## 2021-01-27 RX ADMIN — MAGNESIUM SULFATE IN WATER 2 G: 40 INJECTION, SOLUTION INTRAVENOUS at 09:23

## 2021-01-27 RX ADMIN — THIAMINE HCL TAB 100 MG 100 MG: 100 TAB at 14:26

## 2021-01-27 RX ADMIN — ACETAMINOPHEN 650 MG: 325 TABLET, FILM COATED ORAL at 02:31

## 2021-01-27 RX ADMIN — OXYCODONE HYDROCHLORIDE 10 MG: 5 TABLET ORAL at 12:21

## 2021-01-27 RX ADMIN — THIAMINE HCL TAB 100 MG 100 MG: 100 TAB at 07:49

## 2021-01-27 RX ADMIN — VANCOMYCIN HYDROCHLORIDE 1750 MG: 10 INJECTION, POWDER, LYOPHILIZED, FOR SOLUTION INTRAVENOUS at 10:51

## 2021-01-27 RX ADMIN — FOLIC ACID 1 MG: 1 TABLET ORAL at 07:48

## 2021-01-27 RX ADMIN — LIDOCAINE 1 PATCH: 560 PATCH PERCUTANEOUS; TOPICAL; TRANSDERMAL at 09:28

## 2021-01-27 RX ADMIN — ACETAMINOPHEN 650 MG: 325 TABLET, FILM COATED ORAL at 12:21

## 2021-01-27 RX ADMIN — FUROSEMIDE 60 MG: 10 INJECTION, SOLUTION INTRAMUSCULAR; INTRAVENOUS at 18:26

## 2021-01-27 RX ADMIN — CEFEPIME HYDROCHLORIDE 2 G: 2 INJECTION, POWDER, FOR SOLUTION INTRAVENOUS at 18:00

## 2021-01-27 RX ADMIN — ACETAMINOPHEN 650 MG: 325 TABLET, FILM COATED ORAL at 19:48

## 2021-01-27 RX ADMIN — ATORVASTATIN CALCIUM 40 MG: 40 TABLET, FILM COATED ORAL at 07:49

## 2021-01-27 RX ADMIN — AMIODARONE HYDROCHLORIDE 1 MG/MIN: 50 INJECTION, SOLUTION INTRAVENOUS at 08:58

## 2021-01-27 RX ADMIN — OXYCODONE HYDROCHLORIDE 10 MG: 5 TABLET ORAL at 22:57

## 2021-01-27 RX ADMIN — ALBUMIN HUMAN 250 ML: 0.05 INJECTION, SOLUTION INTRAVENOUS at 06:30

## 2021-01-27 RX ADMIN — KETOROLAC TROMETHAMINE 30 MG: 15 INJECTION, SOLUTION INTRAMUSCULAR; INTRAVENOUS at 21:52

## 2021-01-27 RX ADMIN — ASPIRIN 325 MG ORAL TABLET 325 MG: 325 PILL ORAL at 07:48

## 2021-01-27 RX ADMIN — VANCOMYCIN HYDROCHLORIDE 1750 MG: 10 INJECTION, POWDER, LYOPHILIZED, FOR SOLUTION INTRAVENOUS at 21:10

## 2021-01-27 RX ADMIN — HEPARIN SODIUM 5000 UNITS: 5000 INJECTION, SOLUTION INTRAVENOUS; SUBCUTANEOUS at 04:20

## 2021-01-27 RX ADMIN — MUPIROCIN 0.5 G: 20 OINTMENT TOPICAL at 08:02

## 2021-01-27 RX ADMIN — AMIODARONE HYDROCHLORIDE 200 MG: 200 TABLET ORAL at 07:49

## 2021-01-27 RX ADMIN — CEFEPIME HYDROCHLORIDE 2 G: 2 INJECTION, POWDER, FOR SOLUTION INTRAVENOUS at 01:16

## 2021-01-27 RX ADMIN — POTASSIUM CHLORIDE 20 MEQ: 29.8 INJECTION, SOLUTION INTRAVENOUS at 06:30

## 2021-01-27 RX ADMIN — METHOCARBAMOL 750 MG: 750 TABLET, FILM COATED ORAL at 21:10

## 2021-01-27 RX ADMIN — FUROSEMIDE 60 MG: 10 INJECTION, SOLUTION INTRAMUSCULAR; INTRAVENOUS at 09:26

## 2021-01-27 ASSESSMENT — ACTIVITIES OF DAILY LIVING (ADL)
ADLS_ACUITY_SCORE: 15

## 2021-01-27 NOTE — PHARMACY-VANCOMYCIN DOSING SERVICE
Pharmacy Vancomycin Note  Date of Service 2021  Patient's  1970   50 year old, male    Indication: Aspiration Pneumonia  Goal Trough Level: 15-20 mg/L  Day of Therapy: 2  Current Vancomycin regimen:  1750 mg iv q12h    Current estimated CrCl = Estimated Creatinine Clearance: 155.6 mL/min (A) (based on SCr of 0.65 mg/dL (L)).    Creatinine for last 3 days  2021:  4:19 AM Creatinine 0.75 mg/dL  2021:  2:51 AM Creatinine 0.88 mg/dL;  8:52 AM Creatinine 0.83 mg/dL; 10:16 AM Creatinine 0.81 mg/dL  2021:  3:56 AM Creatinine 0.65 mg/dL    Recent Vancomycin Levels (past 3 days)  2021:  7:46 PM Vancomycin Level 17.2 mg/L    Vancomycin IV Administrations (past 72 hours)                   vancomycin (VANCOCIN) 1,750 mg in sodium chloride 0.9 % 250 mL intermittent infusion (mg) 1,750 mg New Bag 21 2206     1,750 mg New Bag  1037     1,750 mg New Bag 21 2223     1,750 mg New Bag  1026                Nephrotoxins and other renal medications (From now, onward)    Start     Dose/Rate Route Frequency Ordered Stop    21 1130  furosemide (LASIX) injection 60 mg      60 mg  over 1-3 Minutes Intravenous EVERY 6 HOURS 21 1120 21 0529    21 0930  norepinephrine (LEVOPHED) 16 mg in  mL infusion CENTRAL LINE      0.03-0.4 mcg/kg/min × 92.1 kg (Dosing Weight)  2.6-34.5 mL/hr  Intravenous CONTINUOUS 21 0910      21 0930  vancomycin (VANCOCIN) 1,750 mg in sodium chloride 0.9 % 250 mL intermittent infusion      1,750 mg  over 90 Minutes Intravenous EVERY 12 HOURS 21 0913 21 9889             Contrast Orders - past 72 hours (72h ago, onward)    Start     Dose/Rate Route Frequency Ordered Stop    21 1000  perflutren diluted 1mL to 2mL with saline (OPTISON) diluted injection 6 mL      6 mL Intravenous ONCE 21 0949 21 1000          Interpretation of levels and current regimen:  Trough level is  Therapeutic    Has serum  creatinine changed > 50% in last 72 hours: No    Urine output:  good urine output    Renal Function: Stable    Plan:  1.  Continue Current Dose  2.  Pharmacy will check trough levels as appropriate in 1-3 Days.    3. Serum creatinine levels will be ordered daily for the first week of therapy and at least twice weekly for subsequent weeks.      Shannon Moreno, ChaceD        .

## 2021-01-27 NOTE — CONSULTS
Care Management Initial Consult    General Information  Assessment completed with: Jonathan Frazier  Type of CM/SW Visit: Initial Assessment    Primary Care Provider verified and updated as needed: Yes   Readmission within the last 30 days:  No         Communication Assessment  Patient's communication style: spoken language English   Hearing Difficulty or Deaf: no   Wear Glasses or Blind: no    Cognitive  Cognitive/Neuro/Behavioral: WDL  Level of Consciousness: alert  Arousal Level: opens eyes spontaneously  Orientation: oriented x 4  Mood/Behavior: calm, cooperative  Best Language: 0 - No aphasia  Speech: hoarse    Living Environment:   People in home: alone     Current living Arrangements: house         Family/Social Support:  Care provided by: self  Provides care for: no one     Children          Description of Support System: Supportive, Involved       Current Resources:   Skilled Home Care Services:  None  Community Resources: None  Equipment currently used at home: none  Supplies currently used at home: None    Lifestyle & Psychosocial Needs:        Socioeconomic History     Marital status:      Spouse name: Not on file     Number of children: Not on file     Years of education: Not on file     Highest education level: Not on file     Tobacco Use     Smoking status: Current Every Day Smoker     Packs/day: 1.00     Years: 25.00     Pack years: 25.00     Types: Cigarettes     Smokeless tobacco: Never Used     Tobacco comment: Now down to 1/2 PPD smoking   Substance and Sexual Activity     Alcohol use: Yes     Comment: 6-7 mixed drinks nightly     Drug use: Never     Functional Status:  Prior to admission patient needed assistance: No      Additional Information:  Pt is s/p CABG X 3 on 1/22/21.  RNCC met with pt dtr outside pt room for support and complete assessment.  Pt was busy with RN at time of my visit and visited only with pt dtr.  Pt dtr stated the team have been updating them well about the plan  of care.  Per discussion with pt dtr, pt lives alone in [place].  Pt dtr lives near him.  Pt was ind with all cares and mobility prior hospitalization.  RNCC discussed about different post discharge services, Rehab vs home with home care service vs home with OP CR.  Pt dtr stated pt is planning to stay with her if home discharge is recommended.  PT/OT are following and TCU need is anticipated at this time.  RNCC will cont to follow plan of care.

## 2021-01-27 NOTE — PLAN OF CARE
Major Shift Events: Pain control an issue overnight. Pt complaining of pain at chest tube insertion site. IV dilaudid, PO oxycodone, and PO tylenol given around the clock. Attempting aggressive pulmonary toilet, pt does not take initiative with incentive spirometer/Acapella but will complete when encouraged. Levo restarted overnight for MAP consistently <65. Pt heart rhythm changed at 0610 this AM when taking oral pills. HR 100s-130s and irregular. EKG ordered. Provider notified, received verbal orders for additional 40mEq of potassium IV and 250ml albumin. K 3.2 on AM labs, 40mEq PO potassium given right before HR changed. Pt is asymptomatic, will continue to monitor.     Plan: Continue pulmonary toilet, pain control, mobilize    For vital signs and complete assessments, please see documentation flowsheets.

## 2021-01-27 NOTE — PHARMACY-ANTICOAGULATION SERVICE
Clinical Pharmacy - Warfarin Dosing Consult     Pharmacy has been consulted to manage this patient s warfarin therapy.  Indication: Atrial Fibrillation  Therapy Goal: INR 2-3  Provider/Team: CVTS  Warfarin Prior to Admission: No  Significant drug interactions: aspirin, IV heparin, IV ketorolac (increased risk of bleeding); amiodarone, cefepime, vancomycin (increased INR)    INR   Date Value Ref Range Status   01/27/2021 1.24 (H) 0.86 - 1.14 Final   01/22/2021 1.50 (H) 0.86 - 1.14 Final       Recommend warfarin 3 mg today.  Pharmacy will monitor Jaxon Melendez daily and order warfarin doses to achieve specified goal.      Please contact pharmacy as soon as possible if the warfarin needs to be held for a procedure or if the warfarin goals change.      Lise Bhatti PharmD  CVICU and Advanced Heart Failure Pharmacist  Pager [phone]

## 2021-01-27 NOTE — PLAN OF CARE
Major Shift Events:  Pt converted to afib at 0610, given 5mg IV metoprolol and PO amio dose. Per team, given amio bolus and drip. Pt converted to NSR around 0930, currently SB 50s. Levophed off around 1030am. Chest tubes and epicardial wires pulled this am. Up to chair x2. Poor appetite. PRN toradol added for pain.  Plan: Continue to encourage pulm tolieting, encourage PO nutrition. Possible transfer to floor tomorrow.   For vital signs and complete assessments, please see documentation flowsheets.

## 2021-01-27 NOTE — PROGRESS NOTES
CV ICU PROGRESS NOTE  01/27/21       CO-MORBIDITIES:   CAD, multiple vessel  (primary encounter diagnosis)  CAD (coronary artery disease)    ASSESSMENT: Jaxon Melendez is a 50 year old male with PMH of HLD, HTN, DM II, tobacco use, heavy alcohol use, ICM with 10-20% EF, CAD s/p PCI to RCA and LCx in 2010, found to have triple vessel disease. Pre-op IABP, intra-op cardioversion x1, uneventful CABG x 3. IABP removed POD1, planning for extubation today.     Today:   - Diuresis 60 mg TID Lasix, goal -1 L  - replace potassium   - noon BMP  - amioderone load  - start anticoagulation  - remove Blackwell (okay with urology)      PLAN:  Neuro/ pain/ sedation:  # Acute post-operative pain  Heavy alcohol use  - restarted CIWA  - thiamine and folate  - gabapentin / hydromorphone / oxycodone / APAP  - ketorolac 60 mg PRN x 6 h x 2 days      Pulmonary care:   Re intubated 01/25 for acute hypoxic respiratory failure in setting of unstable tachycardia.  Extubated 01/26.  Now on nasal cannula.  - Titrate FiO2 for SpO2 >92%  - continue diuresis   - continue treatment of aspiration      Cardiovascular:    ICM, low EF  CAD  HTN  - IABP 1/22 overnight, removed 01/23  - Monitor hemodynamic status.   -  mg / atorvastatin 40 mg   - MAP goal > 65, on low dose NE  Rapid, wide complex tachycardia  cardioversion x 3 01/25/21, amiodarone bolus  - EP consult, recommends  amiodarone x 8 weeks  - amiodarone load again  - anticoagulation with warfarin bridged with low intensity heparin infusion        GI /Nutrition:   Limited PO intake  - advance diet  - Bowel regimen with senna BID     Fluids/ Electrolytes/ Renal:   - Blackwell for strict I&Os  - lasix diuresis 60 TID, -1 L goal   - replace potassium  - BMP at noon    Endocrine:    Stress hyperglycemia  DM2  PTA lantus (25 units)   - ISS  - holding lantus 12 units until eating more     ID/ Antibiotics:  Concern for aspiration pneumonia 01/24 overnight. No ongoing signs of infection.   -  cefepime and vancomycin 7 day course (01/25-  - pan culture - Gram stain negative, NGTD on cultures, UA negative  - Perioperative antibiotics with cefazolin, complete     Heme:     # Acute blood loss anemia  - Hgb goal >7  - subcutaneous heparin      :  Hypospadias  - padron placed by urology   - Continue padron today   - pyridium yesterday, improving uncomfortable sensation      Prophylaxis:    - SCDs  - Bowel regimen  - PPI  - SQH - stop      Lines/ tubes/ drains:  - CVC  - Arterial line  - Padron  - Chest tube x 3 (split 01/26/21) - remove today      Disposition:  - CV ICU.     Patient seen, findings and plan discussed with CV ICU staff    Shai Maynard III, MD   Resident PGY3    ====================================    SUBJECTIVE:   Chest pain yesterday PM, EKG normal  Issues with pain overnight  afib this morning     OBJECTIVE:   1. VITAL SIGNS:   Temp:  [98.1  F (36.7  C)-98.9  F (37.2  C)] 98.1  F (36.7  C)  Pulse:  [] 115  Resp:  [13-48] 31  MAP:  [57 mmHg-255 mmHg] 78 mmHg  Arterial Line BP: ()/() 117/60  FiO2 (%):  [40 %] 40 %  SpO2:  [93 %-100 %] 96 %  Ventilation Mode: (S) CPAP/PS  (Continuous positive airway pressure with Pressure Support)  FiO2 (%): 40 %  Rate Set (breaths/minute): 16 breaths/min  Tidal Volume Set (mL): 540 mL  PEEP (cm H2O): 5 cmH2O  Pressure Support (cm H2O): 7 cmH2O  Oxygen Concentration (%): 40 %  Resp: (!) 31      2. INTAKE/ OUTPUT:   I/O last 3 completed shifts:  In: 2764.98 [P.O.:1060; I.V.:1274.98; NG/GT:180]  Out: 3810 [Urine:3510; Chest Tube:300]    3. PHYSICAL EXAMINATION:   General: sitting in be  Neuro: A&O, appears uncomfortable  Resp: nasal canula  CV: irregular rhythm   Abdomen: Soft, Non-distended, Non-tender  Incisions: c/d/i  Extremities: warm and well perfused    4. INVESTIGATIONS:   Arterial Blood Gases   Recent Labs   Lab 01/26/21  1613 01/26/21  1228 01/26/21  0717 01/25/21  2146   PH 7.42 7.40 7.43 7.44   PCO2 44 45 43 44   PO2 91 118* 74*  160*   HCO3 28 28 28 30*     Complete Blood Count   Recent Labs   Lab 01/27/21  0411 01/26/21  0356 01/25/21  1016 01/25/21  0852   WBC 9.8 13.6* 19.7* 18.5*   HGB 10.1* 10.0* 11.4* 10.9*    146* 119* 116*     Basic Metabolic Panel  Recent Labs   Lab 01/27/21  0411 01/26/21  1530 01/26/21  0356 01/25/21  1016 01/25/21  0852     --  137 132* 132*   POTASSIUM 3.2* 3.5 3.8 4.8 4.3   CHLORIDE 97  --  103 98 99   CO2 30  --  26 28 26   BUN 19  --  33* 38* 38*   CR 0.58*  --  0.65* 0.81 0.83   *  --  119* 178* 200*     Liver Function Tests  Recent Labs   Lab 01/23/21  0355 01/22/21  1446 01/22/21  1306 01/22/21  0355   AST 21 19  --   --    ALT 10 12  --   --    ALKPHOS 62 87  --   --    BILITOTAL 0.9 0.8  --   --    ALBUMIN 3.1* 3.0*  --   --    INR  --  1.50* 1.54* 1.19*     Pancreatic Enzymes  No lab results found in last 7 days.  Coagulation Profile  Recent Labs   Lab 01/22/21  1446 01/22/21  1306 01/22/21  0355   INR 1.50* 1.54* 1.19*   PTT 36 31  --          5. RADIOLOGY:   No results found for this or any previous visit (from the past 24 hour(s)).    =========================================

## 2021-01-27 NOTE — PLAN OF CARE
Major Shift Events:  Patient extubated at 1415 to 4L NC, currently on 2L NC. Up to chair x1 today with nursing. Diuresed with lasix today, adequate urine output. Pt c/o low left side chest pain at 1730, resident informed, EKG obtained. EKG normal, pain likely from chest tubes.   Plan: Encourage aggressive pulmonary hygiene, increase activity tomorrow, advance diet as tolerated.   For vital signs and complete assessments, please see documentation flowsheets.

## 2021-01-28 ENCOUNTER — APPOINTMENT (OUTPATIENT)
Dept: GENERAL RADIOLOGY | Facility: CLINIC | Age: 51
DRG: 235 | End: 2021-01-28
Attending: SURGERY
Payer: COMMERCIAL

## 2021-01-28 ENCOUNTER — APPOINTMENT (OUTPATIENT)
Dept: PHYSICAL THERAPY | Facility: CLINIC | Age: 51
DRG: 235 | End: 2021-01-28
Attending: STUDENT IN AN ORGANIZED HEALTH CARE EDUCATION/TRAINING PROGRAM
Payer: COMMERCIAL

## 2021-01-28 ENCOUNTER — APPOINTMENT (OUTPATIENT)
Dept: OCCUPATIONAL THERAPY | Facility: CLINIC | Age: 51
DRG: 235 | End: 2021-01-28
Attending: SURGERY
Payer: COMMERCIAL

## 2021-01-28 LAB
ANION GAP SERPL CALCULATED.3IONS-SCNC: 6 MMOL/L (ref 3–14)
ANION GAP SERPL CALCULATED.3IONS-SCNC: 7 MMOL/L (ref 3–14)
BUN SERPL-MCNC: 26 MG/DL (ref 7–30)
BUN SERPL-MCNC: 27 MG/DL (ref 7–30)
CALCIUM SERPL-MCNC: 8.4 MG/DL (ref 8.5–10.1)
CALCIUM SERPL-MCNC: 8.4 MG/DL (ref 8.5–10.1)
CHLORIDE SERPL-SCNC: 101 MMOL/L (ref 94–109)
CHLORIDE SERPL-SCNC: 97 MMOL/L (ref 94–109)
CO2 SERPL-SCNC: 27 MMOL/L (ref 20–32)
CO2 SERPL-SCNC: 30 MMOL/L (ref 20–32)
CREAT SERPL-MCNC: 0.66 MG/DL (ref 0.66–1.25)
CREAT SERPL-MCNC: 0.72 MG/DL (ref 0.66–1.25)
ERYTHROCYTE [DISTWIDTH] IN BLOOD BY AUTOMATED COUNT: 12 % (ref 10–15)
GFR SERPL CREATININE-BSD FRML MDRD: >90 ML/MIN/{1.73_M2}
GFR SERPL CREATININE-BSD FRML MDRD: >90 ML/MIN/{1.73_M2}
GLUCOSE BLDC GLUCOMTR-MCNC: 124 MG/DL (ref 70–99)
GLUCOSE BLDC GLUCOMTR-MCNC: 134 MG/DL (ref 70–99)
GLUCOSE BLDC GLUCOMTR-MCNC: 144 MG/DL (ref 70–99)
GLUCOSE BLDC GLUCOMTR-MCNC: 153 MG/DL (ref 70–99)
GLUCOSE BLDC GLUCOMTR-MCNC: 155 MG/DL (ref 70–99)
GLUCOSE BLDC GLUCOMTR-MCNC: 156 MG/DL (ref 70–99)
GLUCOSE BLDC GLUCOMTR-MCNC: 211 MG/DL (ref 70–99)
GLUCOSE BLDC GLUCOMTR-MCNC: 220 MG/DL (ref 70–99)
GLUCOSE SERPL-MCNC: 141 MG/DL (ref 70–99)
GLUCOSE SERPL-MCNC: 204 MG/DL (ref 70–99)
HCT VFR BLD AUTO: 29.1 % (ref 40–53)
HGB BLD-MCNC: 9.7 G/DL (ref 13.3–17.7)
INR PPP: 1.28 (ref 0.86–1.14)
INTERPRETATION ECG - MUSE: NORMAL
MAGNESIUM SERPL-MCNC: 2.3 MG/DL (ref 1.6–2.3)
MCH RBC QN AUTO: 31.7 PG (ref 26.5–33)
MCHC RBC AUTO-ENTMCNC: 33.3 G/DL (ref 31.5–36.5)
MCV RBC AUTO: 95 FL (ref 78–100)
PLATELET # BLD AUTO: 165 10E9/L (ref 150–450)
POTASSIUM SERPL-SCNC: 3.7 MMOL/L (ref 3.4–5.3)
POTASSIUM SERPL-SCNC: 3.8 MMOL/L (ref 3.4–5.3)
POTASSIUM SERPL-SCNC: 4 MMOL/L (ref 3.4–5.3)
RBC # BLD AUTO: 3.06 10E12/L (ref 4.4–5.9)
SODIUM SERPL-SCNC: 134 MMOL/L (ref 133–144)
SODIUM SERPL-SCNC: 134 MMOL/L (ref 133–144)
UFH PPP CHRO-ACNC: 0.22 IU/ML
UFH PPP CHRO-ACNC: 0.25 IU/ML
UFH PPP CHRO-ACNC: 0.45 IU/ML
WBC # BLD AUTO: 8.3 10E9/L (ref 4–11)

## 2021-01-28 PROCEDURE — 250N000013 HC RX MED GY IP 250 OP 250 PS 637: Performed by: STUDENT IN AN ORGANIZED HEALTH CARE EDUCATION/TRAINING PROGRAM

## 2021-01-28 PROCEDURE — 80048 BASIC METABOLIC PNL TOTAL CA: CPT | Performed by: SURGERY

## 2021-01-28 PROCEDURE — 999N000155 HC STATISTIC RAPCV CVP MONITORING

## 2021-01-28 PROCEDURE — 85520 HEPARIN ASSAY: CPT | Performed by: SURGERY

## 2021-01-28 PROCEDURE — 84132 ASSAY OF SERUM POTASSIUM: CPT | Performed by: SURGERY

## 2021-01-28 PROCEDURE — 97535 SELF CARE MNGMENT TRAINING: CPT | Mod: GO

## 2021-01-28 PROCEDURE — 214N000001 HC R&B CCU UMMC

## 2021-01-28 PROCEDURE — 250N000013 HC RX MED GY IP 250 OP 250 PS 637

## 2021-01-28 PROCEDURE — 999N000128 HC STATISTIC PERIPHERAL IV START W/O US GUIDANCE

## 2021-01-28 PROCEDURE — 250N000013 HC RX MED GY IP 250 OP 250 PS 637: Performed by: NURSE PRACTITIONER

## 2021-01-28 PROCEDURE — 71045 X-RAY EXAM CHEST 1 VIEW: CPT

## 2021-01-28 PROCEDURE — 97116 GAIT TRAINING THERAPY: CPT | Mod: GP

## 2021-01-28 PROCEDURE — 250N000011 HC RX IP 250 OP 636: Performed by: NURSE PRACTITIONER

## 2021-01-28 PROCEDURE — 71045 X-RAY EXAM CHEST 1 VIEW: CPT | Mod: 26 | Performed by: RADIOLOGY

## 2021-01-28 PROCEDURE — 999N000157 HC STATISTIC RCP TIME EA 10 MIN

## 2021-01-28 PROCEDURE — 85610 PROTHROMBIN TIME: CPT | Performed by: STUDENT IN AN ORGANIZED HEALTH CARE EDUCATION/TRAINING PROGRAM

## 2021-01-28 PROCEDURE — 250N000013 HC RX MED GY IP 250 OP 250 PS 637: Performed by: SURGERY

## 2021-01-28 PROCEDURE — 250N000011 HC RX IP 250 OP 636: Performed by: STUDENT IN AN ORGANIZED HEALTH CARE EDUCATION/TRAINING PROGRAM

## 2021-01-28 PROCEDURE — 36415 COLL VENOUS BLD VENIPUNCTURE: CPT | Performed by: SURGERY

## 2021-01-28 PROCEDURE — 250N000011 HC RX IP 250 OP 636: Performed by: SURGERY

## 2021-01-28 PROCEDURE — 97530 THERAPEUTIC ACTIVITIES: CPT | Mod: GP

## 2021-01-28 PROCEDURE — 97110 THERAPEUTIC EXERCISES: CPT | Mod: GO

## 2021-01-28 PROCEDURE — 85520 HEPARIN ASSAY: CPT | Performed by: STUDENT IN AN ORGANIZED HEALTH CARE EDUCATION/TRAINING PROGRAM

## 2021-01-28 PROCEDURE — 80048 BASIC METABOLIC PNL TOTAL CA: CPT | Performed by: STUDENT IN AN ORGANIZED HEALTH CARE EDUCATION/TRAINING PROGRAM

## 2021-01-28 PROCEDURE — 250N000013 HC RX MED GY IP 250 OP 250 PS 637: Performed by: PHYSICIAN ASSISTANT

## 2021-01-28 PROCEDURE — 97161 PT EVAL LOW COMPLEX 20 MIN: CPT | Mod: GP

## 2021-01-28 PROCEDURE — 999N001017 HC STATISTIC GLUCOSE BY METER IP

## 2021-01-28 PROCEDURE — 85027 COMPLETE CBC AUTOMATED: CPT | Performed by: STUDENT IN AN ORGANIZED HEALTH CARE EDUCATION/TRAINING PROGRAM

## 2021-01-28 PROCEDURE — 83735 ASSAY OF MAGNESIUM: CPT | Performed by: STUDENT IN AN ORGANIZED HEALTH CARE EDUCATION/TRAINING PROGRAM

## 2021-01-28 PROCEDURE — 999N000015 HC STATISTIC ARTERIAL MONITORING DAILY

## 2021-01-28 RX ORDER — ACETAMINOPHEN 325 MG/1
975 TABLET ORAL
Status: DISCONTINUED | OUTPATIENT
Start: 2021-01-28 | End: 2021-01-28

## 2021-01-28 RX ORDER — PANTOPRAZOLE SODIUM 40 MG/1
40 TABLET, DELAYED RELEASE ORAL
Status: DISCONTINUED | OUTPATIENT
Start: 2021-01-29 | End: 2021-02-01 | Stop reason: HOSPADM

## 2021-01-28 RX ORDER — FUROSEMIDE 10 MG/ML
100 INJECTION INTRAMUSCULAR; INTRAVENOUS
Status: DISCONTINUED | OUTPATIENT
Start: 2021-01-28 | End: 2021-01-29

## 2021-01-28 RX ORDER — POTASSIUM CHLORIDE 750 MG/1
20 TABLET, EXTENDED RELEASE ORAL ONCE
Status: COMPLETED | OUTPATIENT
Start: 2021-01-28 | End: 2021-01-28

## 2021-01-28 RX ORDER — AMIODARONE HYDROCHLORIDE 200 MG/1
400 TABLET ORAL 2 TIMES DAILY
Status: DISCONTINUED | OUTPATIENT
Start: 2021-01-28 | End: 2021-01-28

## 2021-01-28 RX ORDER — ASPIRIN 81 MG/1
81 TABLET ORAL DAILY
Status: DISCONTINUED | OUTPATIENT
Start: 2021-01-29 | End: 2021-02-01 | Stop reason: HOSPADM

## 2021-01-28 RX ORDER — WARFARIN SODIUM 3 MG/1
3 TABLET ORAL
Status: COMPLETED | OUTPATIENT
Start: 2021-01-28 | End: 2021-01-28

## 2021-01-28 RX ORDER — KETOROLAC TROMETHAMINE 15 MG/ML
30 INJECTION, SOLUTION INTRAMUSCULAR; INTRAVENOUS EVERY 6 HOURS
Status: COMPLETED | OUTPATIENT
Start: 2021-01-28 | End: 2021-01-29

## 2021-01-28 RX ORDER — ACETAMINOPHEN 325 MG/1
975 TABLET ORAL
Status: DISCONTINUED | OUTPATIENT
Start: 2021-01-28 | End: 2021-02-01 | Stop reason: HOSPADM

## 2021-01-28 RX ORDER — POTASSIUM CHLORIDE 29.8 MG/ML
20 INJECTION INTRAVENOUS ONCE
Status: COMPLETED | OUTPATIENT
Start: 2021-01-28 | End: 2021-01-28

## 2021-01-28 RX ORDER — AMIODARONE HYDROCHLORIDE 200 MG/1
400 TABLET ORAL 2 TIMES DAILY
Status: DISCONTINUED | OUTPATIENT
Start: 2021-01-28 | End: 2021-01-29

## 2021-01-28 RX ORDER — KETOROLAC TROMETHAMINE 15 MG/ML
30 INJECTION, SOLUTION INTRAMUSCULAR; INTRAVENOUS EVERY 6 HOURS PRN
Status: DISCONTINUED | OUTPATIENT
Start: 2021-01-28 | End: 2021-01-28

## 2021-01-28 RX ORDER — OXYCODONE HYDROCHLORIDE 5 MG/1
10-15 TABLET ORAL
Status: DISCONTINUED | OUTPATIENT
Start: 2021-01-28 | End: 2021-01-30

## 2021-01-28 RX ADMIN — OXYCODONE HYDROCHLORIDE 10 MG: 5 TABLET ORAL at 05:56

## 2021-01-28 RX ADMIN — POTASSIUM CHLORIDE 20 MEQ: 29.8 INJECTION, SOLUTION INTRAVENOUS at 00:40

## 2021-01-28 RX ADMIN — KETOROLAC TROMETHAMINE 30 MG: 15 INJECTION, SOLUTION INTRAMUSCULAR; INTRAVENOUS at 20:05

## 2021-01-28 RX ADMIN — THIAMINE HCL TAB 100 MG 100 MG: 100 TAB at 13:57

## 2021-01-28 RX ADMIN — OXYCODONE HYDROCHLORIDE 15 MG: 5 TABLET ORAL at 22:00

## 2021-01-28 RX ADMIN — AMIODARONE HYDROCHLORIDE 400 MG: 200 TABLET ORAL at 09:20

## 2021-01-28 RX ADMIN — OXYCODONE HYDROCHLORIDE 10 MG: 5 TABLET ORAL at 10:42

## 2021-01-28 RX ADMIN — DOCUSATE SODIUM 50 MG AND SENNOSIDES 8.6 MG 1 TABLET: 8.6; 5 TABLET, FILM COATED ORAL at 07:41

## 2021-01-28 RX ADMIN — METHOCARBAMOL 750 MG: 750 TABLET, FILM COATED ORAL at 13:57

## 2021-01-28 RX ADMIN — POTASSIUM CHLORIDE 20 MEQ: 750 TABLET, EXTENDED RELEASE ORAL at 17:17

## 2021-01-28 RX ADMIN — MULTIPLE VITAMINS W/ MINERALS TAB 1 TABLET: TAB at 07:41

## 2021-01-28 RX ADMIN — HEPARIN SODIUM 1400 UNITS/HR: 10000 INJECTION, SOLUTION INTRAVENOUS at 00:34

## 2021-01-28 RX ADMIN — PANTOPRAZOLE SODIUM 40 MG: 40 TABLET, DELAYED RELEASE ORAL at 08:02

## 2021-01-28 RX ADMIN — FUROSEMIDE 100 MG: 10 INJECTION, SOLUTION INTRAVENOUS at 12:11

## 2021-01-28 RX ADMIN — OXYCODONE HYDROCHLORIDE 10 MG: 5 TABLET ORAL at 02:52

## 2021-01-28 RX ADMIN — Medication 1 MG: at 21:59

## 2021-01-28 RX ADMIN — METHOCARBAMOL 750 MG: 750 TABLET, FILM COATED ORAL at 18:23

## 2021-01-28 RX ADMIN — PHENAZOPYRIDINE HYDROCHLORIDE 100 MG: 100 TABLET, FILM COATED ORAL at 12:11

## 2021-01-28 RX ADMIN — PHENAZOPYRIDINE HYDROCHLORIDE 100 MG: 100 TABLET, FILM COATED ORAL at 07:41

## 2021-01-28 RX ADMIN — HEPARIN SODIUM 1400 UNITS/HR: 10000 INJECTION, SOLUTION INTRAVENOUS at 19:49

## 2021-01-28 RX ADMIN — WARFARIN SODIUM 3 MG: 3 TABLET ORAL at 17:17

## 2021-01-28 RX ADMIN — ACETAMINOPHEN 975 MG: 325 TABLET, FILM COATED ORAL at 15:54

## 2021-01-28 RX ADMIN — BENZOCAINE AND MENTHOL 1 LOZENGE: 15; 3.6 LOZENGE ORAL at 19:48

## 2021-01-28 RX ADMIN — FOLIC ACID 1 MG: 1 TABLET ORAL at 07:41

## 2021-01-28 RX ADMIN — FUROSEMIDE 60 MG: 10 INJECTION, SOLUTION INTRAMUSCULAR; INTRAVENOUS at 07:42

## 2021-01-28 RX ADMIN — OXYCODONE HYDROCHLORIDE 10 MG: 5 TABLET ORAL at 18:23

## 2021-01-28 RX ADMIN — ACETAMINOPHEN 975 MG: 325 TABLET, FILM COATED ORAL at 23:47

## 2021-01-28 RX ADMIN — KETOROLAC TROMETHAMINE 30 MG: 15 INJECTION, SOLUTION INTRAMUSCULAR; INTRAVENOUS at 09:21

## 2021-01-28 RX ADMIN — PHENAZOPYRIDINE HYDROCHLORIDE 100 MG: 100 TABLET, FILM COATED ORAL at 17:17

## 2021-01-28 RX ADMIN — THIAMINE HCL TAB 100 MG 100 MG: 100 TAB at 07:42

## 2021-01-28 RX ADMIN — ACETAMINOPHEN 650 MG: 325 TABLET, FILM COATED ORAL at 00:11

## 2021-01-28 RX ADMIN — ASPIRIN 325 MG ORAL TABLET 325 MG: 325 PILL ORAL at 07:41

## 2021-01-28 RX ADMIN — CEFEPIME HYDROCHLORIDE 2 G: 2 INJECTION, POWDER, FOR SOLUTION INTRAVENOUS at 07:31

## 2021-01-28 RX ADMIN — DOCUSATE SODIUM 50 MG AND SENNOSIDES 8.6 MG 1 TABLET: 8.6; 5 TABLET, FILM COATED ORAL at 19:40

## 2021-01-28 RX ADMIN — ACETAMINOPHEN 975 MG: 325 TABLET, FILM COATED ORAL at 09:20

## 2021-01-28 RX ADMIN — THIAMINE HCL TAB 100 MG 100 MG: 100 TAB at 19:40

## 2021-01-28 RX ADMIN — METHOCARBAMOL 750 MG: 750 TABLET, FILM COATED ORAL at 07:41

## 2021-01-28 RX ADMIN — ATORVASTATIN CALCIUM 40 MG: 40 TABLET, FILM COATED ORAL at 07:41

## 2021-01-28 RX ADMIN — KETOROLAC TROMETHAMINE 30 MG: 15 INJECTION, SOLUTION INTRAMUSCULAR; INTRAVENOUS at 14:28

## 2021-01-28 RX ADMIN — AMIODARONE HYDROCHLORIDE 400 MG: 200 TABLET ORAL at 19:40

## 2021-01-28 RX ADMIN — LIDOCAINE 1 PATCH: 560 PATCH PERCUTANEOUS; TOPICAL; TRANSDERMAL at 07:42

## 2021-01-28 RX ADMIN — FUROSEMIDE 100 MG: 10 INJECTION, SOLUTION INTRAVENOUS at 17:17

## 2021-01-28 ASSESSMENT — ACTIVITIES OF DAILY LIVING (ADL)
ADLS_ACUITY_SCORE: 17
ADLS_ACUITY_SCORE: 16
ADLS_ACUITY_SCORE: 15
ADLS_ACUITY_SCORE: 15
ADLS_ACUITY_SCORE: 17
ADLS_ACUITY_SCORE: 15

## 2021-01-28 NOTE — PROGRESS NOTES
"Care Management Follow Up    Length of Stay (days): 7    Expected Discharge Date:       Concerns to be Addressed: denies needs/concerns at this time, patient refuses services, discharge planning     Patient plan of care discussed at interdisciplinary rounds: Yes    Anticipated Discharge Disposition: Transitional Care     Anticipated Discharge Services:  TCU  Anticipated Discharge DME:  TCU    Patient/family educated on Medicare website which has current facility and service quality ratings: yes  Education Provided on the Discharge Plan:    Patient/Family in Agreement with the Plan:      Referrals Placed by CM/SW:  None at this time   Private pay costs discussed: Not applicable    Additional Information:  SW spoke with Pt daughter Jonathan regarding Pt discharge plan. Pt has been recommended TCU post discharge. Pt daughter agreed to plan but mentioned Pt will \"more than likely disagree with recommendation.\" SW acknowledged Pt anticipated concerns but noted the team will be coming together to recommend TCU for Pt. She mentioned Pt would like to be near home in [place], [place] if he agrees to TCU stay. SW to f/u with Pt. SW will continue to follow for psychosocial support, resources and advocate on behalf of the patient.    ADELITA Albright, LGSW  ICU    [facility]  Phone: [phone]  Pager: [phone]          "

## 2021-01-28 NOTE — PLAN OF CARE
Major Shift Events:  Art line and R internal jugular CVC removed. 2 PIVs placed. Amio switched from IV to PO. Heparin gtt continues at 1400, Xa therapeutic. Antibiotics discontinued. Lasix dose increased to 100mg TID. Goal net -1L. Pt voiding via urinal. K replaced x1 this shift. CIWA discontinued. Ambulated in dejesus x2 with therapy. Up to chair x2 this shift.   Plan: Encourage IS and activity.   For vital signs and complete assessments, please see documentation flowsheets.     Transferred to: Unit 6C at 1750  Belongings: sent with pt (3 bags)  Blackwell removed? Yes  Central line removed? Yes  Chart and medications sent with patient Yes  Family notified: Yes-Jonathan (daughter)

## 2021-01-28 NOTE — PROGRESS NOTES
CV ICU PROGRESS NOTE  01/28/21       CO-MORBIDITIES:   CAD, multiple vessel  (primary encounter diagnosis)  CAD (coronary artery disease)    ASSESSMENT: Jaxon Melendez is a 50 year old male with PMH of HLD, HTN, DM II, tobacco use, heavy alcohol use, ICM with 10-20% EF, CAD s/p PCI to RCA and LCx in 2010, found to have triple vessel disease. Pre-op IABP, intra-op cardioversion x1, uneventful CABG x 3. IABP removed POD1, re-intubated around cardioversion 01/25, extubated 01/26, intermittent afib post-extubation.     Today:   - Stop CIWA  - diuresis with 100 mg TID  - stopping antibiotics   - amio IV --> PO transition   - decreasing aspirin  - remove lines    - transfer to floor     PLAN:  Neuro/ pain/ sedation:  # Acute post-operative pain  Heavy alcohol use  - stop CIWA   - thiamine and folate  - gabapentin / hydromorphone / oxycodone / APAP  - ketorolac 60 mg x 6 h x 2 days  (1/27) scheduled   - schedule APAP, increase oxy     Pulmonary care:   Re intubated 01/25 for acute hypoxic respiratory failure in setting of unstable tachycardia.  Extubated 01/26.  Now on nasal cannula.  - Titrate FiO2 for SpO2 >92%  - continue diuresis , 100 TID   - stop abx, suspect event was aspiration pneumonitis not pneumonia      Cardiovascular:    ICM, low EF  CAD  HTN  - IABP 1/22 overnight, removed 01/23  - Monitor hemodynamic status.   - ASA 81mg / atorvastatin 40 mg   - MAP goal > 65, off pressors  Rapid, wide complex tachycardia  cardioversion x 3 01/25/21, amiodarone bolus  - EP consult, recommends  amiodarone x 8 weeks  - amiodarone load again 01/27, transitioning IV to 400 BID  - anticoagulation with warfarin bridged with low intensity heparin infusion   - INR non-therapeutic today, continue heparin      GI /Nutrition:   Limited PO intake  - regular diet  - Bowel regimen with senna BID     Fluids/ Electrolytes/ Renal:   Unclear if I/O accurate after Blackwell removed  - Blackwell removed 01/27  - lasix diuresis 100 TID, -1 L goal        Endocrine:    Stress hyperglycemia  DM2  PTA lantus (25 units)   - ISS  - holding lantus 12 units until eating more     ID/ Antibiotics:  Concern for aspiration pneumonia 01/24 overnight. No ongoing signs of infection.   - cefepime and vancomycin 7 day course (01/25-01/28/21)  - pan culture - Gram stain negative, NGTD on cultures, UA negative  - Perioperative antibiotics with cefazolin, complete  - stop abx today     Heme:     # Acute blood loss anemia  - Hgb goal >7  - anticoagulation with heparin     :  Hypospadias  - padron placed by urology   - Continue padron today   - pyridium x1     Prophylaxis:    - SCDs  - Bowel regimen  - PPI  - low intensity heparin / warfarin      Lines/ tubes/ drains:  - CVC - remove  - Arterial line - remove      Disposition:  - floor transfer     Patient seen, findings and plan discussed with CV ICU staff    Shai Maynard III, MD   Resident PGY3    ====================================    SUBJECTIVE:   Chest pain yesterday PM, EKG normal  Issues with pain overnight  afib this morning     OBJECTIVE:   1. VITAL SIGNS:   Temp:  [97.5  F (36.4  C)-98.4  F (36.9  C)] 98.4  F (36.9  C)  Pulse:  [] 59  Resp:  [14-42] 20  BP: (107-131)/(68-69) 131/69  MAP:  [57 mmHg-87 mmHg] 75 mmHg  Arterial Line BP: ()/(41-67) 130/50  SpO2:  [93 %-100 %] 96 %  Resp: 20      2. INTAKE/ OUTPUT:   I/O last 3 completed shifts:  In: 2253.41 [P.O.:480; I.V.:1523.41]  Out: 2133 [Urine:2020; Chest Tube:113]    3. PHYSICAL EXAMINATION:   General: sitting in bed  Neuro: A&O, appears uncomfortable  Resp: nasal canula  CV: irregular rhythm   Abdomen: Soft, Non-distended, Non-tender  Incisions: c/d/i  Extremities: warm and well perfused    4. INVESTIGATIONS:   Arterial Blood Gases   Recent Labs   Lab 01/26/21  1613 01/26/21  1228 01/26/21  0717 01/25/21  2146   PH 7.42 7.40 7.43 7.44   PCO2 44 45 43 44   PO2 91 118* 74* 160*   HCO3 28 28 28 30*     Complete Blood Count   Recent Labs   Lab  01/28/21  0343 01/27/21  0951 01/27/21  0411 01/26/21  0356   WBC 8.3 9.0 9.8 13.6*   HGB 9.7* 10.5* 10.1* 10.0*    169 175 146*     Basic Metabolic Panel  Recent Labs   Lab 01/28/21  0343 01/28/21  0007 01/27/21  1645 01/27/21  1157 01/27/21  0411 01/26/21  0356 01/26/21  0356     --   --  135 134  --  137   POTASSIUM 4.0 3.7 4.3 4.4 3.2*   < > 3.8   CHLORIDE 101  --   --  101 97  --  103   CO2 27  --   --  28 30  --  26   BUN 26  --   --  21 19  --  33*   CR 0.66  --   --  0.70 0.58*  --  0.65*   *  --   --  195* 184*  --  119*    < > = values in this interval not displayed.     Liver Function Tests  Recent Labs   Lab 01/28/21 0343 01/27/21  0951 01/23/21  0355 01/22/21  1446 01/22/21  1306   AST  --   --  21 19  --    ALT  --   --  10 12  --    ALKPHOS  --   --  62 87  --    BILITOTAL  --   --  0.9 0.8  --    ALBUMIN  --   --  3.1* 3.0*  --    INR 1.28* 1.24*  --  1.50* 1.54*     Pancreatic Enzymes  No lab results found in last 7 days.  Coagulation Profile  Recent Labs   Lab 01/28/21 0343 01/27/21  0951 01/22/21  1446 01/22/21  1306   INR 1.28* 1.24* 1.50* 1.54*   PTT  --  36 36 31         5. RADIOLOGY:   Recent Results (from the past 24 hour(s))   XR Chest Port 1 View    Narrative    EXAM: XR CHEST PORT 1 VW  1/27/2021 11:25 AM     HISTORY:  chest tube removal       COMPARISON:  1/27/2021 at 0301 hours    FINDINGS:   AP portable view of the chest. Postoperative chest with mediastinal  surgical clips and median sternotomy wires. Chest tubes have been  removed. Right IJ central venous catheter tip projects in the low SVC.  No appreciable pneumothorax. Stable cardiomegaly. Unchanged small left  pleural effusion. No significant change in the right greater than left  patchy perihilar pulmonary opacities.      Impression    IMPRESSION:  1. No appreciable pneumothorax.  2. Stable small left pleural effusion.  3. Stable cardiomegaly with no significant change in moderate  pulmonary edema.    I have  personally reviewed the examination and initial interpretation  and I agree with the findings.    SAMMY PEREIRA MD       =========================================

## 2021-01-28 NOTE — PLAN OF CARE
Major Shift Events:  Pt complained of incisional pain, Robaxin, Oxy, Tylenol, Toradol given. Pt voiding in urinal, CVP 15-16, K replaced. Next 10A at 1000.  Plan: Possible floor orders today. Continue to monitor and notify MD of questions or concerns.  For vital signs and complete assessments, please see documentation flowsheets.

## 2021-01-28 NOTE — PROGRESS NOTES
01/28/21 1556   Quick Adds   Type of Visit Initial PT Evaluation       Present no   Language English   Living Environment   People in home alone   Current Living Arrangements house   Home Accessibility stairs to enter home   Number of Stairs, Main Entrance 3   Transportation Anticipated family or friend will provide   Living Environment Comments Patient lives alone in a home, however, will plan to discharge to his daughter's home which is the information provided above.    Self-Care   Usual Activity Tolerance good   Current Activity Tolerance fair   Regular Exercise No   Equipment Currently Used at Home none   Activity/Exercise/Self-Care Comment Reports independence with all ADLs prior to admission.    Disability/Function   Hearing Difficulty or Deaf no   Wear Glasses or Blind no   Concentrating, Remembering or Making Decisions Difficulty no   Difficulty Communicating no   General Information   Onset of Illness/Injury or Date of Surgery 01/24/21   Referring Physician Lupe Suarez MD   Patient/Family Therapy Goals Statement (PT) Return home at PLOF   Pertinent History of Current Problem (include personal factors and/or comorbidities that impact the POC) Jaxon Melendez is a 50 year old male with PMH of HLD, HTN, DM II, tobacco use, heavy alcohol use, ICM with 10-20% EF, CAD s/p PCI to RCA and LCx in 2010, found to have triple vessel disease. Pre-op IABP, intra-op cardioversion x1, uneventful CABG x 3.    Existing Precautions/Restrictions sternal   Weight-Bearing Status - LUE partial weight-bearing (% in comments)  (10 lbs)   Weight-Bearing Status - RUE partial weight-bearing (% in comments)  (10 lbs)   Weight-Bearing Status - LLE full weight-bearing   Weight-Bearing Status - RLE full weight-bearing   General Observations Activity: up with assist   Cognition   Orientation Status (Cognition) oriented x 4   Affect/Mental Status (Cognition) WNL;WFL   Follows Commands (Cognition) WNL;WFL    Pain Assessment   Patient Currently in Pain Yes, see Vital Sign flowsheet   Integumentary/Edema   Integumentary/Edema Comments Wound vac present on sternal wound   Posture    Posture Forward head position;Protracted shoulders   Range of Motion (ROM)   ROM Comment B LE WNL/WFL   Strength   Strength Comments B LE > 3/5 secondary to functional mobility   Bed Mobility   Comment (Bed Mobility) Demonstrates lying>sitting with Mod A and elevated HOB. Requires Mod A x 2 for sitting>lying.    Transfers   Transfer Safety Comments sit>stand with Mod A    Gait/Stairs (Locomotion)   [place] Level (Gait) 1 person to manage equipment;minimum assist (75% patient effort)   Assistive Device (Gait) walker, front-wheeled   Distance in Feet (Required for LE Total Joints) 40   Pattern (Gait) 3-point;step-through   Maintains Weight-bearing Status (Gait) able to maintain   Balance   Balance no deficits were identified   Sensory Examination   Sensory Perception WNL   Coordination   Coordination no deficits were identified   Muscle Tone   Muscle Tone no deficits were identified   Clinical Impression   Criteria for Skilled Therapeutic Intervention yes, treatment indicated   PT Diagnosis (PT) Impaired functional mobility   Influenced by the following impairments Impaired CV endurance, LE strength, pain, sternal precautions   Functional limitations due to impairments Impaired bed mobility, transfers, stairs, gait, driving   Clinical Presentation Stable/Uncomplicated   Clinical Presentation Rationale clinical judgement; current mobility   Clinical Decision Making (Complexity) low complexity   Therapy Frequency (PT) 6x/week   Predicted Duration of Therapy Intervention (days/wks) 2/5/21   Risk & Benefits of therapy have been explained evaluation/treatment results reviewed;care plan/treatment goals reviewed;participants voiced agreement with care plan;participants included;patient   Clinical Impression Comments Silvio will benefit from  continued skilled IP PT to progress overall LE strength and activity tolerance to return to PLOF.   PT Discharge Planning    PT Discharge Recommendation (DC Rec) [facility] patient will benefit from intensive, interdisciplinary therapy.  Anticipate will be able to tolerate 3 hours of therapy per day   PT Rationale for DC Rec motivated, previously IND, requires assistance with all mobility   PT Brief overview of current status  Mod A for bed mobility and transfers   Total Evaluation Time   Total Evaluation Time (Minutes) 6

## 2021-01-29 ENCOUNTER — APPOINTMENT (OUTPATIENT)
Dept: CARDIOLOGY | Facility: CLINIC | Age: 51
DRG: 235 | End: 2021-01-29
Attending: PHYSICIAN ASSISTANT
Payer: COMMERCIAL

## 2021-01-29 ENCOUNTER — APPOINTMENT (OUTPATIENT)
Dept: OCCUPATIONAL THERAPY | Facility: CLINIC | Age: 51
DRG: 235 | End: 2021-01-29
Attending: SURGERY
Payer: COMMERCIAL

## 2021-01-29 ENCOUNTER — APPOINTMENT (OUTPATIENT)
Dept: GENERAL RADIOLOGY | Facility: CLINIC | Age: 51
DRG: 235 | End: 2021-01-29
Attending: PHYSICIAN ASSISTANT
Payer: COMMERCIAL

## 2021-01-29 ENCOUNTER — APPOINTMENT (OUTPATIENT)
Dept: PHYSICAL THERAPY | Facility: CLINIC | Age: 51
DRG: 235 | End: 2021-01-29
Attending: SURGERY
Payer: COMMERCIAL

## 2021-01-29 LAB
ANION GAP SERPL CALCULATED.3IONS-SCNC: 7 MMOL/L (ref 3–14)
BUN SERPL-MCNC: 28 MG/DL (ref 7–30)
CA-I BLD-MCNC: 4.5 MG/DL (ref 4.4–5.2)
CALCIUM SERPL-MCNC: 8.7 MG/DL (ref 8.5–10.1)
CHLORIDE SERPL-SCNC: 100 MMOL/L (ref 94–109)
CO2 SERPL-SCNC: 28 MMOL/L (ref 20–32)
CREAT SERPL-MCNC: 0.78 MG/DL (ref 0.66–1.25)
ERYTHROCYTE [DISTWIDTH] IN BLOOD BY AUTOMATED COUNT: 12.3 % (ref 10–15)
GFR SERPL CREATININE-BSD FRML MDRD: >90 ML/MIN/{1.73_M2}
GLUCOSE BLDC GLUCOMTR-MCNC: 137 MG/DL (ref 70–99)
GLUCOSE BLDC GLUCOMTR-MCNC: 139 MG/DL (ref 70–99)
GLUCOSE BLDC GLUCOMTR-MCNC: 155 MG/DL (ref 70–99)
GLUCOSE BLDC GLUCOMTR-MCNC: 156 MG/DL (ref 70–99)
GLUCOSE BLDC GLUCOMTR-MCNC: 220 MG/DL (ref 70–99)
GLUCOSE SERPL-MCNC: 155 MG/DL (ref 70–99)
HCT VFR BLD AUTO: 28.9 % (ref 40–53)
HGB BLD-MCNC: 9.6 G/DL (ref 13.3–17.7)
INR PPP: 1.69 (ref 0.86–1.14)
INTERPRETATION ECG - MUSE: NORMAL
MAGNESIUM SERPL-MCNC: 2.2 MG/DL (ref 1.6–2.3)
MCH RBC QN AUTO: 31.7 PG (ref 26.5–33)
MCHC RBC AUTO-ENTMCNC: 33.2 G/DL (ref 31.5–36.5)
MCV RBC AUTO: 95 FL (ref 78–100)
PLATELET # BLD AUTO: 214 10E9/L (ref 150–450)
POTASSIUM SERPL-SCNC: 4.2 MMOL/L (ref 3.4–5.3)
RBC # BLD AUTO: 3.03 10E12/L (ref 4.4–5.9)
SODIUM SERPL-SCNC: 135 MMOL/L (ref 133–144)
UFH PPP CHRO-ACNC: 0.18 IU/ML
UFH PPP CHRO-ACNC: 0.24 IU/ML
UFH PPP CHRO-ACNC: 0.32 IU/ML
WBC # BLD AUTO: 10.8 10E9/L (ref 4–11)

## 2021-01-29 PROCEDURE — 71046 X-RAY EXAM CHEST 2 VIEWS: CPT

## 2021-01-29 PROCEDURE — 85610 PROTHROMBIN TIME: CPT | Performed by: SURGERY

## 2021-01-29 PROCEDURE — 250N000013 HC RX MED GY IP 250 OP 250 PS 637: Performed by: PHYSICIAN ASSISTANT

## 2021-01-29 PROCEDURE — 250N000013 HC RX MED GY IP 250 OP 250 PS 637: Performed by: STUDENT IN AN ORGANIZED HEALTH CARE EDUCATION/TRAINING PROGRAM

## 2021-01-29 PROCEDURE — 85520 HEPARIN ASSAY: CPT | Performed by: SURGERY

## 2021-01-29 PROCEDURE — 82330 ASSAY OF CALCIUM: CPT

## 2021-01-29 PROCEDURE — 250N000011 HC RX IP 250 OP 636: Performed by: STUDENT IN AN ORGANIZED HEALTH CARE EDUCATION/TRAINING PROGRAM

## 2021-01-29 PROCEDURE — 71046 X-RAY EXAM CHEST 2 VIEWS: CPT | Mod: 26 | Performed by: RADIOLOGY

## 2021-01-29 PROCEDURE — 36415 COLL VENOUS BLD VENIPUNCTURE: CPT | Performed by: SURGERY

## 2021-01-29 PROCEDURE — 999N000208 ECHOCARDIOGRAM COMPLETE

## 2021-01-29 PROCEDURE — 93306 TTE W/DOPPLER COMPLETE: CPT | Mod: 26 | Performed by: INTERNAL MEDICINE

## 2021-01-29 PROCEDURE — 214N000001 HC R&B CCU UMMC

## 2021-01-29 PROCEDURE — 999N001017 HC STATISTIC GLUCOSE BY METER IP

## 2021-01-29 PROCEDURE — 97535 SELF CARE MNGMENT TRAINING: CPT | Mod: GO

## 2021-01-29 PROCEDURE — 255N000002 HC RX 255 OP 636: Performed by: INTERNAL MEDICINE

## 2021-01-29 PROCEDURE — 250N000013 HC RX MED GY IP 250 OP 250 PS 637: Performed by: SURGERY

## 2021-01-29 PROCEDURE — 250N000013 HC RX MED GY IP 250 OP 250 PS 637: Performed by: NURSE PRACTITIONER

## 2021-01-29 PROCEDURE — 250N000011 HC RX IP 250 OP 636: Performed by: NURSE PRACTITIONER

## 2021-01-29 PROCEDURE — 80048 BASIC METABOLIC PNL TOTAL CA: CPT | Performed by: SURGERY

## 2021-01-29 PROCEDURE — 97116 GAIT TRAINING THERAPY: CPT | Mod: GP

## 2021-01-29 PROCEDURE — 85027 COMPLETE CBC AUTOMATED: CPT | Performed by: SURGERY

## 2021-01-29 PROCEDURE — 83735 ASSAY OF MAGNESIUM: CPT | Performed by: SURGERY

## 2021-01-29 PROCEDURE — 85520 HEPARIN ASSAY: CPT | Performed by: INTERNAL MEDICINE

## 2021-01-29 PROCEDURE — 36415 COLL VENOUS BLD VENIPUNCTURE: CPT | Performed by: INTERNAL MEDICINE

## 2021-01-29 PROCEDURE — 97530 THERAPEUTIC ACTIVITIES: CPT | Mod: GP

## 2021-01-29 PROCEDURE — 93010 ELECTROCARDIOGRAM REPORT: CPT | Performed by: INTERNAL MEDICINE

## 2021-01-29 RX ORDER — LIDOCAINE 4 G/G
1-3 PATCH TOPICAL
Status: DISCONTINUED | OUTPATIENT
Start: 2021-01-29 | End: 2021-02-01 | Stop reason: HOSPADM

## 2021-01-29 RX ORDER — TRAMADOL HYDROCHLORIDE 50 MG/1
50-100 TABLET ORAL EVERY 6 HOURS PRN
Status: DISCONTINUED | OUTPATIENT
Start: 2021-01-29 | End: 2021-02-01 | Stop reason: HOSPADM

## 2021-01-29 RX ORDER — WARFARIN SODIUM 3 MG/1
3 TABLET ORAL
Status: COMPLETED | OUTPATIENT
Start: 2021-01-29 | End: 2021-01-29

## 2021-01-29 RX ORDER — METHOCARBAMOL 500 MG/1
500 TABLET, FILM COATED ORAL 4 TIMES DAILY
Status: DISCONTINUED | OUTPATIENT
Start: 2021-01-29 | End: 2021-02-01 | Stop reason: HOSPADM

## 2021-01-29 RX ORDER — AMIODARONE HYDROCHLORIDE 200 MG/1
400 TABLET ORAL DAILY
Status: DISCONTINUED | OUTPATIENT
Start: 2021-01-30 | End: 2021-02-01 | Stop reason: HOSPADM

## 2021-01-29 RX ADMIN — MULTIPLE VITAMINS W/ MINERALS TAB 1 TABLET: TAB at 09:21

## 2021-01-29 RX ADMIN — INSULIN ASPART 4 UNITS: 100 INJECTION, SOLUTION INTRAVENOUS; SUBCUTANEOUS at 11:46

## 2021-01-29 RX ADMIN — PANTOPRAZOLE SODIUM 40 MG: 40 TABLET, DELAYED RELEASE ORAL at 09:22

## 2021-01-29 RX ADMIN — METHOCARBAMOL 500 MG: 500 TABLET, FILM COATED ORAL at 16:03

## 2021-01-29 RX ADMIN — METHOCARBAMOL 500 MG: 500 TABLET, FILM COATED ORAL at 19:17

## 2021-01-29 RX ADMIN — WARFARIN SODIUM 3 MG: 3 TABLET ORAL at 18:15

## 2021-01-29 RX ADMIN — LIDOCAINE 1 PATCH: 560 PATCH PERCUTANEOUS; TOPICAL; TRANSDERMAL at 08:22

## 2021-01-29 RX ADMIN — FUROSEMIDE 100 MG: 10 INJECTION, SOLUTION INTRAVENOUS at 11:48

## 2021-01-29 RX ADMIN — ACETAMINOPHEN 975 MG: 325 TABLET, FILM COATED ORAL at 08:23

## 2021-01-29 RX ADMIN — OXYCODONE HYDROCHLORIDE 15 MG: 5 TABLET ORAL at 04:29

## 2021-01-29 RX ADMIN — DOCUSATE SODIUM 50 MG AND SENNOSIDES 8.6 MG 1 TABLET: 8.6; 5 TABLET, FILM COATED ORAL at 19:17

## 2021-01-29 RX ADMIN — HUMAN ALBUMIN MICROSPHERES AND PERFLUTREN 5 ML: 10; .22 INJECTION, SOLUTION INTRAVENOUS at 11:30

## 2021-01-29 RX ADMIN — ACETAMINOPHEN 975 MG: 325 TABLET, FILM COATED ORAL at 16:42

## 2021-01-29 RX ADMIN — TRAMADOL HYDROCHLORIDE 100 MG: 50 TABLET, FILM COATED ORAL at 10:45

## 2021-01-29 RX ADMIN — FUROSEMIDE 100 MG: 10 INJECTION, SOLUTION INTRAVENOUS at 09:23

## 2021-01-29 RX ADMIN — KETOROLAC TROMETHAMINE 30 MG: 15 INJECTION, SOLUTION INTRAMUSCULAR; INTRAVENOUS at 02:13

## 2021-01-29 RX ADMIN — PHENAZOPYRIDINE HYDROCHLORIDE 100 MG: 100 TABLET, FILM COATED ORAL at 11:48

## 2021-01-29 RX ADMIN — THIAMINE HCL TAB 100 MG 100 MG: 100 TAB at 09:22

## 2021-01-29 RX ADMIN — OXYCODONE HYDROCHLORIDE 10 MG: 5 TABLET ORAL at 20:54

## 2021-01-29 RX ADMIN — METHOCARBAMOL 500 MG: 500 TABLET, FILM COATED ORAL at 11:48

## 2021-01-29 RX ADMIN — ATORVASTATIN CALCIUM 40 MG: 40 TABLET, FILM COATED ORAL at 09:22

## 2021-01-29 RX ADMIN — HEPARIN SODIUM 1700 UNITS/HR: 10000 INJECTION, SOLUTION INTRAVENOUS at 19:40

## 2021-01-29 RX ADMIN — THIAMINE HCL TAB 100 MG 100 MG: 100 TAB at 13:29

## 2021-01-29 RX ADMIN — FOLIC ACID 1 MG: 1 TABLET ORAL at 09:22

## 2021-01-29 RX ADMIN — HEPARIN SODIUM 1550 UNITS/HR: 10000 INJECTION, SOLUTION INTRAVENOUS at 09:44

## 2021-01-29 RX ADMIN — PHENAZOPYRIDINE HYDROCHLORIDE 100 MG: 100 TABLET, FILM COATED ORAL at 09:28

## 2021-01-29 RX ADMIN — AMIODARONE HYDROCHLORIDE 400 MG: 200 TABLET ORAL at 09:23

## 2021-01-29 RX ADMIN — THIAMINE HCL TAB 100 MG 100 MG: 100 TAB at 19:17

## 2021-01-29 RX ADMIN — OXYCODONE HYDROCHLORIDE 10 MG: 5 TABLET ORAL at 11:58

## 2021-01-29 RX ADMIN — ASPIRIN 81 MG: 81 TABLET ORAL at 09:22

## 2021-01-29 ASSESSMENT — ACTIVITIES OF DAILY LIVING (ADL)
ADLS_ACUITY_SCORE: 17
ADLS_ACUITY_SCORE: 16
ADLS_ACUITY_SCORE: 17
ADLS_ACUITY_SCORE: 17

## 2021-01-29 NOTE — PROGRESS NOTES
Rehab Admissions:  I spoke with Silvio and his daughter over the phone today about the acute rehab program. We discussed ELOS (~10-12 days), family support after discharge, baseline function, goals of acute rehab, what to bring, physician involvement, therapy needs after acute rehab, current visitor policy and no smoking policy. I answered all of their questions.      Thank you for the referral, we will continue to follow this patient for post acute placement.     Determination of admission is based upon the patient's need for an intensive, interdisciplinary approach to rehabilitation, their ability to progress, their ability to tolerate intensive therapies, their need for daily physician supervision, their need for twenty four hour nursing assistance, and their ability and willingness to participate in such a program.    He will need prior authorization from his insurance-clinicals were sent to insurance for review today.     Va Dominguez CM  Rehab Liaison/  [facility]  1/29/2021    4:29 PM

## 2021-01-29 NOTE — PROGRESS NOTES
PT arrived on unit 6C with the following items:    - cell phone and   - two pt belongings bags  - jacket  - pair of shoes  - money clip  - change of clothes

## 2021-01-29 NOTE — PROVIDER NOTIFICATION
Yoly Elkins paged d/t pt HR dropping to mid 30's-40 intermittently then popping back up to baseline HR 50-60's while resting in bed. Longest pause 2.3 second. BP stable. Pt c/o chest discomfort that's different than his baseline chest discomfort. C/o indigestion. Stat EKG ordered. Provider called back and coming to see patient.    9959 update: Yoly Philip paged again with update that pt landy episode happened again with longest pause 2.8 seconds. Pt resting in bed. Labs just drawn. Denies symptoms.

## 2021-01-29 NOTE — PLAN OF CARE
Admitted 1/21 for CABG X3 c/b A-fib with RVR and acute hypoxic respiratory failure during cardioversion. Medical history includes: PMHx HLD, HTN, DM2, tobacco use, heavy alcohol use, ICM w/ EF 10-20%, CAD s/p PCI (2010). VSS. Alert and oriented X 4. Lung sounds clear and diminished, weaning off oxygen. Currently on 1 L NC. Sinus to sinus landy with overnight dips into the 30's and 40's. Five episodes of HR dipping into the 40's on shift, asymptomatic. Heparin running continuously. Wound vac d/c'd. Mepolex on coccyx changed; patient repositioned every two hours for preservation of skin integrity. Scheduled Tylenol, Robaxin and PRN Oxy and Tramadol for incisional pain. Patient strong with walking today. Continuing to monitor.     First therapeutic AntiXa lab, re-check at 1930, continuing to run at 1550 units / hour.

## 2021-01-29 NOTE — PLAN OF CARE
D: Admitted 1/21 for CABG X3 c/b A-fib with RVR and acute hypoxic respiratory failure during cardioversion    PMHx HLD, HTN, DM2, tobacco use, heavy alcohol use, ICM w/ EF 10-20%, CAD s/p PCI (2010)     I: Monitored vitals and assessed pt status.   Changed: see provider notification note  Running: Heparin @ 1550 unit(s)/hr (Xa recheck at 12:45 pm)  PRN: Lozenge, Oxycodone x2  Tele: SR/SB w/ prolonged Qtc and PVCs  O2: 1L NC  Mobility: Ax2 w/ walker/GB     A: A0x4. VSS. Afebrile. Denies SOB. Urinating adequately. LBM 1/27. Sternal incision with wound vac to -125 continuous suction. L leg graft sites WNL, HANH. Old CT site drsg CDI. Buttock chronic cysts with small amount of drainage. Mepilex changed. C/o incisional pain. Scheduled and PRN pain meds given. Repo q2. L and R PIV. q4 hr BG with sliding scale given per parameters. Able to make needs known.     P: Continue to monitor Pt status and report changes to CVTS.

## 2021-01-29 NOTE — PROGRESS NOTES
Called to patient bedside due to brief episode of Bradycardia to 40s bpm. At the time of the bradycardia, he described R lower chest pressure, feeling warm, and said he felt like he needed to focus on his breathing. This happened about 15 min after receiving his oxycodone and he thinks it may be related although he has previously taken oxycodone without issue. He was resting in bed when it happened. When I evaluated him his symptoms had resolved and his pulse was back to the mid 50's which has been his baseline the past few days. He denied chest pain or difficulty breathing at this time.     /60 (BP Location: Right arm)   Pulse (!) 44   Temp 97.7  F (36.5  C) (Oral)   Resp 16   Wt 89.2 kg (196 lb 10.4 oz)   SpO2 98%   BMI 28.22 kg/m       Exam  AOx4, NAD, sitting comfortably in bed  Bradycardic to 50s no cyanosis  nonlabored breathing on 1L NC, no cough or wheeze  Chest wall appropriately tender to palpation, chest discomfort non reproducible, wound vac in place  Abdomen soft, nontender, nondistended, no rebound or guarding    A/P  Jaxon Melendez is a 50 year old male with PMH of HLD, HTN, DM II, tobacco use, heavy alcohol use, ICM with 10-20% EF, CAD s/p PCI to RCA and LCx in 2010, found to have triple vessel disease. Pre-op IABP, intra-op cardioversion x1, uneventful CABG x 3. IABP removed POD1, re-intubated around cardioversion 01/25, extubated 01/26, intermittent afib post-extubation.     Now with episodic bradycardia to the 40s with associated chest discomfort.    - STAT EKG  - AM labs due now, add on iCal and Mg  - Will convey changes to primary team    Yoly Philip DO  Surgery PGY1

## 2021-01-29 NOTE — PROGRESS NOTES
Care Management Follow Up    Length of Stay (days): 8    Expected Discharge Date: 01/31/21     Concerns to be Addressed: discharge planning  Patient plan of care discussed at interdisciplinary rounds: Yes    Anticipated Discharge Disposition: Acute rehab center    Patient/family educated on Medicare website which has current facility and service quality ratings: N/A for ARU  Education Provided on the Discharge Plan:  yes  Patient/Family in Agreement with the Plan:  yes    Referrals Placed by CM/SW:  ARUs  Private pay costs discussed: Not applicable    Additional Information:  PT recommending ARU placement, writing pt may progress to home d/c soon. Per primary team pt might be medically stable for d/c Sunday, 1/31. Met with pt's and pt's daughter Jonathan at bedside to discuss discharge plan. Pt prefers rehab placement to d/c home. Pt aware of acute rehab and pt's dtr states he has been to [facility] in [place] before. Discussed that pt may be cleared for home d/c prior to locating an accepting ARU. Pt still requests ARU be attempted. [facility] Pt's dtr requests referral to  ARU as well- pt agreeable.    If pt is cleared to d/c home he will d/c to his dtr's house. Pt's daughter resides in [place] code [id number], and pt's PCP is Dr. Woodruff at [facility] in [place], [place] (if pt needs home care).    Spoke with [facility] (ph [phone], fax [phone])- admissions is not in today and they do not accept weekend admissions. Referral cannot be reviewed until Monday. Faxed referral. Initiated referral to  rehab liaison Va.    Referral status:  1) [facility] (ph [phone], fax [phone])- faxed referral, will not be reviewed until Monday  2)  ARU- liaison assessing    ADELITA Garcia, LICSW  6C   [facility]  Pager [phone]  Phone [phone]    Addendum 2:00pm: Rehab  [facility] reviewed pt's referral and states pt meets ARU requirements currently. Spoke with pt's dtr via phone while in pt's room. Discussed that  rehab could potentially accept pt this weekend, and [facility] will not review pt until Monday. Pt and pt's dtr confirm they are agreeable with [facility]. [facility] to initiate insurance auth.

## 2021-01-29 NOTE — PROGRESS NOTES
1/29/2021   CVTS Progress Note  Attending provider: Jose Gibbons MD        S:  No acute issues over night. Patient denies  SOB, CP, fever, chills, sweats. Pain still an issue. Using oxycodone scheduled tylenol. Patient  eating.  Ambulating in halls. +BM since surgery, none today. No arrhythmias.     O:   Vitals:    01/29/21 0443 01/29/21 0607 01/29/21 0810 01/29/21 0859   BP: 118/60  124/62 135/73   BP Location: Right arm  Right arm Right arm   Pulse: (!) 44  56    Resp:   16    Temp:   97.7  F (36.5  C)    TempSrc:   Oral    SpO2: 98%  96%    Weight:  89.5 kg (197 lb 6.4 oz)       Vitals:    01/27/21 0400 01/28/21 0000 01/29/21 0607   Weight: 89.8 kg (197 lb 15.6 oz) 89.2 kg (196 lb 10.4 oz) 89.5 kg (197 lb 6.4 oz)       Intake/Output Summary (Last 24 hours) at 1/29/2021 1009  Last data filed at 1/29/2021 0700  Gross per 24 hour   Intake 1156.35 ml   Output 850 ml   Net 306.35 ml       Gen: AxOx3, NAD  CV: RRR, no murmurs, rubs, or gallops, HR 50's   Pulm: +CTA, no wheezing, no rhonchi  Abd: soft, NT, ND, +BS, no BM today  Ext: no LE edema  Incision: c/d/i, no erythema, sternal stable      Labs:   BMP  Recent Labs   Lab 01/29/21  0548 01/28/21  1613 01/28/21  0343 01/28/21  0007 01/27/21  1157 01/27/21  1157    134 134  --   --  135   POTASSIUM 4.2 3.8 4.0 3.7   < > 4.4   CHLORIDE 100 97 101  --   --  101   RICHARD 8.7 8.4* 8.4*  --   --  8.6   CO2 28 30 27  --   --  28   BUN 28 27 26  --   --  21   CR 0.78 0.72 0.66  --   --  0.70   * 204* 141*  --   --  195*    < > = values in this interval not displayed.     CBC  Recent Labs   Lab 01/29/21  0548 01/28/21  0343 01/27/21  0951 01/27/21  0411   WBC 10.8 8.3 9.0 9.8   RBC 3.03* 3.06* 3.36* 3.18*   HGB 9.6* 9.7* 10.5* 10.1*   HCT 28.9* 29.1* 31.6* 30.6*   MCV 95 95 94 96   MCH 31.7 31.7 31.3 31.8   MCHC 33.2 33.3 33.2 33.0   RDW 12.3 12.0 11.9 12.0    165 169 175     INR  Recent Labs   Lab 01/29/21  0548 01/28/21  0343 01/27/21  0951 01/22/21  1446    INR 1.69* 1.28* 1.24* 1.50*      Hepatic Panel   Lab Results   Component Value Date    AST 21 01/23/2021     Lab Results   Component Value Date    ALT 10 01/23/2021     No results found for: BILICONJ   Lab Results   Component Value Date    BILITOTAL 0.9 01/23/2021     Lab Results   Component Value Date    ALBUMIN 3.1 01/23/2021     Lab Results   Component Value Date    PROTTOTAL 5.9 01/23/2021      Lab Results   Component Value Date    ALKPHOS 62 01/23/2021       Recent Labs   Lab 01/29/21  0808 01/29/21  0548 01/29/21  0424 01/28/21  2347 01/28/21  2138 01/28/21  1939 01/28/21  1613 01/28/21  1552 01/28/21  0343 01/28/21  0343 01/27/21  1157 01/27/21  1157 01/27/21  0411 01/27/21  0411 01/26/21  0356 01/26/21  0356   GLC  --  155*  --   --   --   --  204*  --   --  141*  --  195*  --  184*  --  119*   *  --  155* 124* 144* 220*  --  211*   < >  --    < >  --    < >  --    < >  --     < > = values in this interval not displayed.         Imaging:   /29/21  8:46 AM [id number] LTAC, located within [facility]    PACS Images     Show images for XR Chest 2 Views   Study Result    EXAMINATION:  XR CHEST 2 VW 1/29/2021 8:46 AM.     COMPARISON: 1/28/2021.     HISTORY:  s/p CAB     FINDINGS: PA and lateral views of the chest. Interval removal of the  right IJ central venous catheter. Postsurgical changes of CABG.  Midline trachea. Stable cardiomegaly. Pulmonary vasculature is  indistinct. Mild perihilar prominence. Decreased size of the left  pleural effusion. Decreased retrocardiac opacities. No pneumothorax.  Upper abdomen is unremarkable.                                                                      IMPRESSION:   1. Decreased size of the small left pleural effusion with associated  retrocardiac atelectasis.  2. Decreased pulmonary edema.     I have personally reviewed the examination and initial interpretation  and I agree with the findings.     MILAGRO MITCHELL MD         ASSESSMENT: Jaxon Melendez is a 50  year old male with PMH of HLD, HTN, DM II, tobacco use, heavy alcohol use, ICM with 10-20% EF, CAD s/p PCI to RCA and LCx in 2010, found to have triple vessel disease. Pre-op IABP, intra-op cardioversion x1, uneventful CABG x 3. IABP removed POD1. He was originally transferred to the floor on POD#2, on POD#3 he developed a-flutter with RVR, BBB, and hemodynamic stability. He was transferred back to the unit and required reintubation and cardioversion x 3 1/25. He required hemodynamic support and IV amiodarone. He was extubated 01/26  and has had intermittent afib post-extubation. He is currently on heparin drip and in sinus landy in the 50's.        PLAN:  Neuro/ pain/ sedation:  # Acute post-operative pain  Heavy alcohol use  - thiamine and folate  - gabapentin / hydromorphone / oxycodone / APAP  - ketorolac 60 mg x 6 h x 2 days  (1/27)   - schedule APAP, increase oxycodone to 15 mg q3. Start scheduled Robaxin and Lidoderm patches. PRN Tramadol.       Pulmonary care:   Re intubated 01/25 for acute hypoxic respiratory failure in setting of unstable tachycardia.  Extubated 01/26.  Now on nasal cannula.  - Titrate FiO2 for SpO2 >92%  - improved pulmonary edema, continue diureses.      Cardiovascular:    ICM, low EF  CAD  HTN  - IABP 1/22 overnight, removed 01/23  - Monitor hemodynamic status.   - ASA 81mg / atorvastatin 40 mg   - MAP goal > 65, off pressors  Rapid, wide complex tachycardia  cardioversion x 3 01/25/21, amiodarone bolus  - EP consult, recommends  amiodarone x 8 weeks  - amiodarone load again 01/27, transitioning IV to 400 BID. Decrease to 400 mg daily due to HR in 50's.   - anticoagulation with warfarin bridged with low intensity heparin infusion  - Echocardiogram today for post op baseline. May need life vest if EF less than 30%.     GI /Nutrition:   Limited PO intake  - regular diet  - Bowel regimen with senna BID     Fluids/ Electrolytes/ Renal:   - Blackwell removed 01/27  - lasix diuresis 100 TID, -1 L  goal, will adjust after Echo today.   - Cr NL  - WT below baseline        Endocrine:    Stress hyperglycemia  DM2  PTA lantus (25 units)   - ISS  - resume lantus 12 units 1/29     ID/ Antibiotics:  Concern for aspiration pneumonia 01/24 overnight. No ongoing signs of infection.   - cefepime and vancomycin 7 day course (01/25-01/28/21)  - pan culture - Gram stain negative, NGTD on cultures, UA negative  - Perioperative antibiotics with cefazolin, complete     Heme:     # Acute blood loss anemia  - Hgb goal >7  - anticoagulation with heparin and coumadin. INr 1.68 today. Goal 2-3.      :  Hypospadias  - padron out, urinating well.  - Pyridium tid.      Prophylaxis:    - SCDs  - Bowel regimen  - PPI  - low intensity heparin / warfarin      Lines/ tubes/ drains:  - PIV     Disposition:    - 6C since 1/28  - inpatient until INR therapeutic and on oral diuretics.       Carmen Mack PA-C  Cardiothoracic Surgery   Pager [phone]  January 29, 2021

## 2021-01-29 NOTE — PROGRESS NOTES
CLINICAL NUTRITION SERVICES - REASSESSMENT NOTE     Nutrition Prescription    RECOMMENDATIONS FOR MDs/PROVIDERS TO ORDER:  None at this time.     Malnutrition Status:    Patient does not meet two of the established criteria necessary for diagnosing malnutrition but is at risk for malnutrition    Recommendations already ordered by Registered Dietitian (RD):  Oral supplements    Future/Additional Recommendations:  1. Continue regular diet, as ordered, to help encourage oral intake. Encourage intake of small, frequent meals and intake of oral supplements.   2. Monitor BG control. Hgb A1c of 11.4 on 1/21/21. BG was 220 on 1/29. Consider Endo consult.    3. Continue multivitamin with minerals, as ordered, to help meet micronutrient needs.   4. Continue folic acid and thiamine supplementation, as ordered, due to etoh use.   5. Consider checking thiamine, folic acid, and vitamin B 12 labs. Supplement if labs are low.   6. Continue bowel regimen.   7. Monitor GI sx (vomiting in the evening intermittently, per pt).  8. Monitor lytes (Phos, Mg++, and K+) for refeeding syndrome. Pt with +5 urinary ketones per urinalysis on 1/25, but low refeeding risk.      EVALUATION OF THE PROGRESS TOWARD GOALS   Diet: Regular diet order since 1/27. Pt was on a regular diet 1/21, NPO 1/22-1/23, regular diet 1/23-1/25, and then NPO 1/25-1/26.   Intake: Per nursing flowsheets (% intake), pt consuming 75% with a good appetite 1/21, 50% with a fair appetite 1/25, 50% on 1/27, 50-75% with a fair appetite 1/28, and 100% of meals with a good appetite 1/29. Per discussion with pt, his appetite is improving and has normalized. Has been thinking about trying oral supplements. [employer] (meal ordering system) indicates food/beverages sent 1/27-1/28 totaled 1079 kcals and 27 g protein daily on average. Other factors affecting oral intake include recent surgery and diet advancement.     NEW FINDINGS   Nutrition Hx: Per discussion with pt and daughter,  pt reports eating less than normal amounts since December 2020. States at times, he would skip meals. Noted that if he ate too much in the evening, he may vomit. Generalized lack of appetite and unable to quantify exact amount less than normal he was eating. Cooks with olive oil.   GI: Having zero to one stool daily. Stools are loose and brown. Per pt, no vomiting this admit.   Resp: Extubated most recently on 1/26. On 1 L O2.   Wt Hx: 83.8 kg (1/6/21 at OSH), 91.4 kg (1/22/21, admit), 89.5 kg (1/29) - Per discussion with pt, he weighed 230# (104.5 kg) about a year ago. Wt loss was unintentional (pt's daughter notices his wt loss and believes it may be due to pt's diabetes). Pt lost 14% of his body wt over the last year.    ASSESSED NUTRITION NEEDS (updated)  Dosing Weight: 89 kg (based on lowest weight this admission of 89.1 kg on 1/25)   Estimated Energy Needs: 0770-1640 kcals/day (25 - 30 kcals/kg)  Justification: Maintenance  Estimated Protein Needs: 107-134+ grams protein/day (1.2 - 1.5+ grams of pro/kg)  Justification: Increased needs post-op  Estimated Fluid Needs: 0340-6543 mL/day (1 mL/kcal)   Justification: Maintenance needs or may require the low end of this range. Per provider.     MALNUTRITION  % Intake: </= 50% for >/= 5 days (severe)  % Weight Loss: Weight loss does not meet criteria  Subcutaneous Fat Loss: None observed  Muscle Loss: None observed  Fluid Accumulation/Edema: None noted  Malnutrition Diagnosis: Patient does not meet two of the established criteria necessary for diagnosing malnutrition but is at risk for malnutrition    Previous Goals   Diet adv vs nutrition support within 2-3 days.  Evaluation: Met initially, but then reintubated for 24-48 more hours.     Previous Nutrition Diagnosis  Inadequate oral intake related to NPO for procedure as evidenced by meeting 0% nutrition needs for >1 day    Evaluation: Unresolved. Updated below.     CURRENT NUTRITION DIAGNOSIS  Inadequate oral intake  related to recent surgery and diet advancement as evidenced by [employer] (meal ordering system) indicates food/beverages sent 1/27-1/28 totaled 1079 kcals and 27 g protein daily on average while estimated needs are 7355-4618 kcals/day (25 - 30 kcals/kg) and 107-134+ grams protein/day (1.2 - 1.5+ grams of pro/kg).    INTERVENTIONS  Implementation  Nutrition education (pt and daughter): Discussed the importance of adequate nutrition intake as pt's first priority. Encouraged adequate oral intake, high protein options, and intake of oral supplements. Provided verbal instruction on a heart-healthy diet. Discussed the various types of fats, the importance of limiting sodium, eating more fruits and vegetables, limiting simple carbohydrate (and sweets), and choosing whole grains, and keeping a healthy weight. Importance of glycemic control discussed. Encouraged a balance of all food groups. Provided handouts: How to Read Nutrition Labels, Nutrition Care Manual Handouts on Heart-Healthy Eating Nutrition Therapy, MyPlate, and Living with Diabetes.   Medical food supplement therapy: Discussed oral supplements. Ordered chocolate Boost Glucose Control  at 14:00, fruit punch Gelatein at HS.    Goals  Patient to consume % of nutritionally adequate meal trays TID, or the equivalent with supplements/snacks.    Monitoring/Evaluation  Progress toward goals will be monitored and evaluated per protocol.     Nutrition will continue to follow.     Sarah Garcia, MS, RD, LD, [facility]   6C Pgr: [phone]

## 2021-01-29 NOTE — INTERIM SUMMARY
"[facility] Pre-Admission Screen    Referral Source:  [facility] [address] [facility] CARDIAC SERVICES  Admit date to referring facility: 1/21/2021    Physical Medicine and Rehab Consult Completed: No    Rehab Diagnosis:    09 Cardiac: CABGx3    Justification for Acute Inpatient Rehabilitation  Jxaon \"Silvio\" Nora is a 50 year old male with PMH of HLD, HTN, DM II, tobacco use, heavy alcohol use (6-7 drinks daily), ICM with 10-20% EF, CAD s/p PCI to RCA and LCx in 2010. He continueds to smoke. He was more recently seen with retrosternal chest pain exacerbated by exertion, relieved with rest. Troponins were elevated. He was treated for acute coronary syndrome and taken to the cath lab where he was found to have severe multi-vessel disease including in-stent restenosis. His EF was ~10%. He was admitted to [facility] 1/21 and underwent CABG x3, requiring intra-operative cardioversion x1. His hospital course was complicated by Aflutter/Afib w/ RVR requiring cardioversion x3 on 1/25/21, acute hypoxic respiratory failure in setting of unstable tachycardia, aspiration pneumonitis, bradycardia, stress hyperglycemia, and shock. He has also required medical mgmt of his post-operative pain. He is now medically stable and ready for admission to acute rehab.   Patient requires an intensive inpatient rehab program to address the following acute impairments: impaired ROM, impaired strength, impaired activity tolerance, fatigue, JAVED, new post-surgical sternal precautions and impaired balance. These impairments are contributing to functional limitations, specifically impacting his safety and independence w/ transfers, gait, stairs, ADLs, and IADLs.     Current Active Medical Management Needs/Risks for Clinical Complications  The patient requires the high level of rehabilitation physician supervision that accompanies the provision of intensive rehabilitation therapy.  The patient needs the " services of the rehabilitation physician to assess the patient medically and functionally and to modify the course of treatment as needed to maximize the patient's capacity to benefit from the rehabilitation process.   The patient requires physician involvement at least 3 days per week for medical mgmt and assessment of:    Cardiac function in setting of ICM, CAD, HTN, CABG x3, and A-Futter/Afib: assess hemodynamic status, MAP goal > 65. Currently on ASA, Lisinopril. EP recommends Amiodarone x8 weeks, Warfarin (INR goal 2-3). Post-op ECHO is EF 30-35%.     HLD: Atorvastatin    Pain: to ensure optimal participation in all therapies- Scheduled Tylenol, Dilaudid, Robaxin and Lidocaine patch, Tramadol.    Respiratory status in setting of respiratory failure and pulmonary edema: goal SpO2 > 92%, promote pulmonary hygiene, on diuresis.    Renal function, fluid and electrolyte balance in setting of low EF: currently on Lasix, potassium supplementation. Trend weights, I&Os.    Endocrine function in setting of diabetes mellitus and stress hyperglycemia: Lantus, Novolog, Metformin. Pt w/ newly insulin dependent DM - provide diabetes education.    Hx of heavy alcohol use: Thiamine, Folvite, multi-vitamin.     Nutritional status: Multi-vitamin, reg diet w/ thin liquids, Supplements: Chocolate Boost Glucose Control at 1400, Fruit Punch Gelatein at HS.    Bowel function as pt at risk for opioid induced constipation: Senna Docusate.    Integument in setting of sternal incision, Hidradenitis suppurativa, and buttocks/coccyx wounds: perform wound care as ordered, assess for wound healing.    The patient is at risk for falls w/ injury, infection, wound dehiscence, respiratory decompensation, malnutrition, and DVT.     Past Medical/Surgical History  Surgery in the past 100 days: Yes  Additional relevant past medical history: HLD, HTN, DM II, current tobacco use, heavy alcohol use (6-7 drinks daily), ICM with 10-20% EF, CAD s/p PCI to  RCA and LCx in 2010, pyridium.    Level of Functioning Prior to Admission:  LIVING ENVIRONMENT  People in home: alone  Current Living Arrangements: house  Home Accessibility: stairs to enter home  Number of Stairs, Main Entrance: 3 (2 steps w/in the home, does not need to use).  Transportation Anticipated: family or friend will provide  Living Environment Comments: Pt plans to discharge to his daughter's home which is the information provided above after discharge from acute rehab.     SELF-CARE  Usual Activity Tolerance: good  Regular Exercise: No  Equipment Currently Used at Home: none  Activity/Exercise/Self-Care Comment: IND with all ADLs prior to admission.     DISABILITY/FUNCTION  Change in Functional Status Since Onset of Current Illness/Injury: yes  Additional Comments: At baseline Silvio works in a factory.     Level of Function: GG Scale (Section GG Functional Ability and Goals; CMS's MONAE Version 3.0 Manual effective 10.1.2019):  PT Current Function Goals for Rehab   Bed Rolling 4 Supervision or touching assitance 6 Independent   Supine to Sit 3 Partial/moderate assistance 6 Independent   Sit to Stand 4 Supervision or touching assitance 6 Independent   Transfer 4 Supervision or touching assitance 6 Independent   Ambulation 4 Supervision or touching assitance 6 Independent   Stairs 4 Supervision or touching assitance 6 Independent     OT Current Function Goals for Rehab   Feeding 5 Setup or clean-up assistance 6 Independent   Grooming 4 Supervision or touching assitance 6 Independent   Bathing 3 Partial/moderate assistance 6 Independent   Upper Body Dressing 4 Supervision or touching assitance 6 Independent   Lower Body Dressing 1 Dependent 6 Independent   Toileting 3 Partial/moderate assistance 6 Independent   Toilet Transfer 3 Partial/moderate assistance 6 Independent   Tub/Shower Transfer Not completed 6 Independent   Cognition Not Assessed Independent     SLP Current Function Goals for Rehab   Swallow Not  Impaired Not applicable   Communication Not Impaired Not applicable       Current Diet:  Regular diet and Thin liquids, Supplements: Chocolate Boost Glucose Control at 1400, Fruit Punch Gelatein at HS.    Summary Statement:  He performs supine <> sit w/ HOB elevated, cues for logroll technique in setting of sternal precautions w/ min A. He perform STS transfers to WW w/ CGA. The patient currently performs gait x 350 feet with FWW and CGA, slow pace noted w/ heavy reliance on WW for balance and UE support. He performs 3' step ups x12 reps, w/ CGA and B railings w/ cues for safe sequencing.  He patient performs standing ADLs with close SBA, cues for sternal precautions and energy conservation. The patient requires Min/CGA for toileting and toilet transfer.  He has been unable to perform LB dressing d/t impaired range of motion and inability to perform figure four technique - he will require further instruction in AE to assist w/ LB dressing. Pt has been A&Ox4, however, close monitoring of cognitive needs is advised in setting of heavy alcohol use and risk for cognitive impairment.    Expected Therapies and Services Required During Inpatient Rehab Admission  Intensity of Therapy: Patient requires intensive therapies not available in a lesser level of care. Patient is motivated, making gains, and can tolerate 3 hours of therapy a day.  Physical Therapy: 90 minutes per day, 7 days a week for 6 days  Occupational Therapy: 90 minutes per day, 7 days a week for 6 days  Speech and Language Therapy: no current speech therapy needs.   Rehabilitation Nursing Needs: Patient requires 24 hour Rehab Nursing to manage wound care, bowel program, vitals, medication education, positioning, carryover of new rehab techniques, care coordination, skin integrity, blood sugar management, diabetes education, pain management, provide safe environment for patient at falls risk and monitor nutritional intake.    Precautions/restrictions/special  needs:  Precautions: fall precautions and Sternal precautions  Restrictions: No reaching overhead x2 weeks. No lifting, pushing, pulling > 10# for first 6 weeks, then no lifting > 20# for additional 6 weeks.  Special Needs: Designated visitor: charla Castillo.     Expected Level of Improvement: Anticipate with intensive therapies, close medical management, and rehabilitative nursing care the patient will improve strength, balance, tolerance to activity, and use of compensatory strategies to ensure mod I with basic mobility and ADL performance to allow return home with family A and ongoing OP CR.   Expected Length of time to achieve: 6 days    Anticipated Discharge Needs:  Anticipated Discharge Destination: Home  Anticipated Discharge Support: Family member  24/7 support available : Unknown  Identified caregiver(s):  Charla Castillo  Anticipated Discharge Needs: Home with outpatient therapy and Outpatient Cardiac/Pulmonary Rehab. Follow up TELEPHONE visit with [facility] Surgery Advance Care Practitioners on 2/16/21 at 2 pm.  He will then return to the care of your primary provider and your cardiologist. Future medication refills should come from his PCP or Cardiologist. Pt should see his primary care provider in 1-2 weeks after discharge from Rehab Center. F/u w/ cardiologist (Dr Deutsch) about 4-6 weeks after discharge.  Will need Ziopatch 3 weeks prior to OP cardiologist f/u.     Identified challenges/barriers: None    Rehab Admission Liaison Signature:    Physician statement of review and agreement:  I have reviewed and am in agreement of the need for IRF stay to address above functional and medical needs. In addition to above statements address, Patient requires intensive active and ongoing therapeutic intervention and multiple therapies; Patient requires medical supervision; Expected to actively participate in the intensive rehab program; Sufficiently stable to actively participate; Expectation for measurable  improvement in functional capacity or adaption to impairments.    Physician Signature/Date/Time:

## 2021-01-29 NOTE — PROGRESS NOTES
Admission - 1/28/21 1800  -----------------------------------------------------------  Reason for admission: CABG X3 c/b A-fib with RVR and Acute Hypoxic Respiratory Failure during cardioversion.    Admitted from:   Via: hospital bed  Accompanied by: Resource RN    Belongings: Placed in closet;  Admission Profile: complete  Teaching: orientation to unit and call light- call light within reach, call don't fall, use of console, meal times, when to call for the RN, and enforced importance of safety   Access: PIV x2  Telemetry:Placed on pt  Ht./Wt.: complete  Code Status verified on armband: yes  2 RN Skin Assessment Completed By:  Needs to be completed.   Med Rec completed: yes    Pt status:  A&O X4.  VSS. Sinus landy in 50's with occasional PVC's.  Hep gtt at 1450 units/hr, therapeutic, recheck in a.m.  Wound vac to chest, c/d/i.  Report given to oncoming RN.  Left LENA with his daughter Jonathan to update her on the transfer.      Temp:  [97.4  F (36.3  C)-98.4  F (36.9  C)] 97.4  F (36.3  C)  Pulse:  [51-65] 58  Resp:  [17-28] 20  BP: (109-148)/(53-75) 128/74  MAP:  [66 mmHg-105 mmHg] 89 mmHg  Arterial Line BP: (106-160)/(45-72) 145/65  SpO2:  [86 %-100 %] 94 %

## 2021-01-29 NOTE — PLAN OF CARE
Dx: S/p uneventful CABG x 3.  On POD#3 he developed a-flutter with RVR, BBB.  He was transferred back to the unit and required reintubation and cardioversion x 3 1/25.    PMH: of HLD, HTN, DM II, tobacco use, heavy alcohol use, ICM with 10-20% EF, CAD s/p PCI in 2010.     Neuro: alox4  Cardiac: SR. Pt 10 bts and 9 bts of SVT (BD=919's) at 10am, per MT. CVTS on call resident was notified and no new orders at this time.   Respiratory: on 1lNC. LSC. Pt using IS and Acapella q1hr while awake.   GI: No BM today. Last BM 2 days ago  : Adequate UO  Diet/appetite: Appetite is fair. Pt had a late lunch and only had snacks for dinner.   Activity: attempted to get OOB and up in chiar, but pt declined.   Pain: Back and incisional pain. PRN and scheduled pain meds were given with some relife (see MAR)  Skin: Coccyx covered with Mepilex drsg. Turned pt and repositioned pt while in bed.   LDA's:PIV- infusing Heparin gtt at 1700 units/hr. Next 10A is at 0130.    Plan:  Continue to monitor. Encourage IS/accapela. Wean off O2 if able. Possible discharge to TCU early next week.

## 2021-01-30 ENCOUNTER — APPOINTMENT (OUTPATIENT)
Dept: PHYSICAL THERAPY | Facility: CLINIC | Age: 51
DRG: 235 | End: 2021-01-30
Attending: SURGERY
Payer: COMMERCIAL

## 2021-01-30 LAB
ANION GAP SERPL CALCULATED.3IONS-SCNC: 3 MMOL/L (ref 3–14)
BUN SERPL-MCNC: 24 MG/DL (ref 7–30)
CALCIUM SERPL-MCNC: 8.8 MG/DL (ref 8.5–10.1)
CHLORIDE SERPL-SCNC: 100 MMOL/L (ref 94–109)
CO2 SERPL-SCNC: 32 MMOL/L (ref 20–32)
CREAT SERPL-MCNC: 0.66 MG/DL (ref 0.66–1.25)
ERYTHROCYTE [DISTWIDTH] IN BLOOD BY AUTOMATED COUNT: 12.4 % (ref 10–15)
GFR SERPL CREATININE-BSD FRML MDRD: >90 ML/MIN/{1.73_M2}
GLUCOSE BLDC GLUCOMTR-MCNC: 143 MG/DL (ref 70–99)
GLUCOSE BLDC GLUCOMTR-MCNC: 144 MG/DL (ref 70–99)
GLUCOSE BLDC GLUCOMTR-MCNC: 149 MG/DL (ref 70–99)
GLUCOSE SERPL-MCNC: 145 MG/DL (ref 70–99)
HCT VFR BLD AUTO: 30.2 % (ref 40–53)
HGB BLD-MCNC: 9.8 G/DL (ref 13.3–17.7)
INR PPP: 2.31 (ref 0.86–1.14)
MAGNESIUM SERPL-MCNC: 2.2 MG/DL (ref 1.6–2.3)
MCH RBC QN AUTO: 31.4 PG (ref 26.5–33)
MCHC RBC AUTO-ENTMCNC: 32.5 G/DL (ref 31.5–36.5)
MCV RBC AUTO: 97 FL (ref 78–100)
PLATELET # BLD AUTO: 254 10E9/L (ref 150–450)
POTASSIUM SERPL-SCNC: 3.9 MMOL/L (ref 3.4–5.3)
RBC # BLD AUTO: 3.12 10E12/L (ref 4.4–5.9)
SODIUM SERPL-SCNC: 135 MMOL/L (ref 133–144)
UFH PPP CHRO-ACNC: 0.28 IU/ML
UFH PPP CHRO-ACNC: 0.28 IU/ML
WBC # BLD AUTO: 11.3 10E9/L (ref 4–11)

## 2021-01-30 PROCEDURE — 97116 GAIT TRAINING THERAPY: CPT | Mod: GP | Performed by: PHYSICAL THERAPIST

## 2021-01-30 PROCEDURE — 250N000013 HC RX MED GY IP 250 OP 250 PS 637: Performed by: STUDENT IN AN ORGANIZED HEALTH CARE EDUCATION/TRAINING PROGRAM

## 2021-01-30 PROCEDURE — 80048 BASIC METABOLIC PNL TOTAL CA: CPT | Performed by: SURGERY

## 2021-01-30 PROCEDURE — 250N000011 HC RX IP 250 OP 636: Performed by: NURSE PRACTITIONER

## 2021-01-30 PROCEDURE — 250N000013 HC RX MED GY IP 250 OP 250 PS 637: Performed by: NURSE PRACTITIONER

## 2021-01-30 PROCEDURE — 250N000013 HC RX MED GY IP 250 OP 250 PS 637: Performed by: PHYSICIAN ASSISTANT

## 2021-01-30 PROCEDURE — 97530 THERAPEUTIC ACTIVITIES: CPT | Mod: GP | Performed by: PHYSICAL THERAPIST

## 2021-01-30 PROCEDURE — 214N000001 HC R&B CCU UMMC

## 2021-01-30 PROCEDURE — 85610 PROTHROMBIN TIME: CPT | Performed by: SURGERY

## 2021-01-30 PROCEDURE — 85520 HEPARIN ASSAY: CPT | Performed by: SURGERY

## 2021-01-30 PROCEDURE — 36415 COLL VENOUS BLD VENIPUNCTURE: CPT | Performed by: INTERNAL MEDICINE

## 2021-01-30 PROCEDURE — 85520 HEPARIN ASSAY: CPT | Performed by: INTERNAL MEDICINE

## 2021-01-30 PROCEDURE — 36415 COLL VENOUS BLD VENIPUNCTURE: CPT | Performed by: SURGERY

## 2021-01-30 PROCEDURE — 999N001017 HC STATISTIC GLUCOSE BY METER IP

## 2021-01-30 PROCEDURE — 85027 COMPLETE CBC AUTOMATED: CPT | Performed by: SURGERY

## 2021-01-30 PROCEDURE — 83735 ASSAY OF MAGNESIUM: CPT | Performed by: SURGERY

## 2021-01-30 PROCEDURE — 250N000013 HC RX MED GY IP 250 OP 250 PS 637: Performed by: SURGERY

## 2021-01-30 RX ORDER — WARFARIN SODIUM 1 MG/1
2 TABLET ORAL
Status: COMPLETED | OUTPATIENT
Start: 2021-01-30 | End: 2021-01-30

## 2021-01-30 RX ORDER — LISINOPRIL 5 MG/1
5 TABLET ORAL DAILY
Status: DISCONTINUED | OUTPATIENT
Start: 2021-01-30 | End: 2021-02-01 | Stop reason: HOSPADM

## 2021-01-30 RX ORDER — LISINOPRIL 2.5 MG/1
2.5 TABLET ORAL DAILY
Status: DISCONTINUED | OUTPATIENT
Start: 2021-01-30 | End: 2021-01-30

## 2021-01-30 RX ORDER — LISINOPRIL 2.5 MG/1
2.5 TABLET ORAL ONCE
Status: COMPLETED | OUTPATIENT
Start: 2021-01-30 | End: 2021-01-30

## 2021-01-30 RX ORDER — HYDROMORPHONE HYDROCHLORIDE 2 MG/1
2-4 TABLET ORAL
Status: DISCONTINUED | OUTPATIENT
Start: 2021-01-30 | End: 2021-02-01 | Stop reason: HOSPADM

## 2021-01-30 RX ORDER — POTASSIUM CHLORIDE 750 MG/1
20 TABLET, EXTENDED RELEASE ORAL 2 TIMES DAILY
Status: DISCONTINUED | OUTPATIENT
Start: 2021-01-30 | End: 2021-02-01 | Stop reason: HOSPADM

## 2021-01-30 RX ORDER — HYDROMORPHONE HYDROCHLORIDE 2 MG/1
2-4 TABLET ORAL
Status: DISCONTINUED | OUTPATIENT
Start: 2021-01-30 | End: 2021-01-30

## 2021-01-30 RX ORDER — FUROSEMIDE 40 MG
40 TABLET ORAL
Status: DISCONTINUED | OUTPATIENT
Start: 2021-01-30 | End: 2021-02-01 | Stop reason: HOSPADM

## 2021-01-30 RX ADMIN — ATORVASTATIN CALCIUM 40 MG: 40 TABLET, FILM COATED ORAL at 08:05

## 2021-01-30 RX ADMIN — THIAMINE HCL TAB 100 MG 100 MG: 100 TAB at 14:16

## 2021-01-30 RX ADMIN — HEPARIN SODIUM 1700 UNITS/HR: 10000 INJECTION, SOLUTION INTRAVENOUS at 00:01

## 2021-01-30 RX ADMIN — METHOCARBAMOL 500 MG: 500 TABLET, FILM COATED ORAL at 20:48

## 2021-01-30 RX ADMIN — FUROSEMIDE 40 MG: 40 TABLET ORAL at 09:39

## 2021-01-30 RX ADMIN — FOLIC ACID 1 MG: 1 TABLET ORAL at 08:04

## 2021-01-30 RX ADMIN — INSULIN ASPART 1 UNITS: 100 INJECTION, SOLUTION INTRAVENOUS; SUBCUTANEOUS at 08:05

## 2021-01-30 RX ADMIN — METHOCARBAMOL 500 MG: 500 TABLET, FILM COATED ORAL at 08:04

## 2021-01-30 RX ADMIN — ACETAMINOPHEN 975 MG: 325 TABLET, FILM COATED ORAL at 08:03

## 2021-01-30 RX ADMIN — POTASSIUM CHLORIDE 20 MEQ: 750 TABLET, EXTENDED RELEASE ORAL at 20:49

## 2021-01-30 RX ADMIN — ACETAMINOPHEN 975 MG: 325 TABLET, FILM COATED ORAL at 00:01

## 2021-01-30 RX ADMIN — HYDROMORPHONE HYDROCHLORIDE 2 MG: 2 TABLET ORAL at 20:48

## 2021-01-30 RX ADMIN — MULTIPLE VITAMINS W/ MINERALS TAB 1 TABLET: TAB at 08:05

## 2021-01-30 RX ADMIN — HYDROMORPHONE HYDROCHLORIDE 4 MG: 2 TABLET ORAL at 11:14

## 2021-01-30 RX ADMIN — WARFARIN SODIUM 2 MG: 2 TABLET ORAL at 18:59

## 2021-01-30 RX ADMIN — INSULIN ASPART 1 UNITS: 100 INJECTION, SOLUTION INTRAVENOUS; SUBCUTANEOUS at 11:18

## 2021-01-30 RX ADMIN — OXYCODONE HYDROCHLORIDE 15 MG: 5 TABLET ORAL at 00:01

## 2021-01-30 RX ADMIN — AMIODARONE HYDROCHLORIDE 400 MG: 200 TABLET ORAL at 08:04

## 2021-01-30 RX ADMIN — PANTOPRAZOLE SODIUM 40 MG: 40 TABLET, DELAYED RELEASE ORAL at 08:04

## 2021-01-30 RX ADMIN — ACETAMINOPHEN 975 MG: 325 TABLET, FILM COATED ORAL at 23:49

## 2021-01-30 RX ADMIN — ASPIRIN 81 MG: 81 TABLET ORAL at 08:05

## 2021-01-30 RX ADMIN — METHOCARBAMOL 500 MG: 500 TABLET, FILM COATED ORAL at 11:14

## 2021-01-30 RX ADMIN — LISINOPRIL 2.5 MG: 2.5 TABLET ORAL at 11:15

## 2021-01-30 RX ADMIN — HYDROMORPHONE HYDROCHLORIDE 4 MG: 2 TABLET ORAL at 23:49

## 2021-01-30 RX ADMIN — TRAMADOL HYDROCHLORIDE 50 MG: 50 TABLET, FILM COATED ORAL at 05:13

## 2021-01-30 RX ADMIN — ACETAMINOPHEN 975 MG: 325 TABLET, FILM COATED ORAL at 16:22

## 2021-01-30 RX ADMIN — THIAMINE HCL TAB 100 MG 100 MG: 100 TAB at 08:04

## 2021-01-30 RX ADMIN — LIDOCAINE 1 PATCH: 560 PATCH PERCUTANEOUS; TOPICAL; TRANSDERMAL at 08:05

## 2021-01-30 RX ADMIN — HYDROMORPHONE HYDROCHLORIDE 2 MG: 2 TABLET ORAL at 18:20

## 2021-01-30 RX ADMIN — HYDROMORPHONE HYDROCHLORIDE 2 MG: 2 TABLET ORAL at 14:16

## 2021-01-30 RX ADMIN — METHOCARBAMOL 500 MG: 500 TABLET, FILM COATED ORAL at 16:22

## 2021-01-30 RX ADMIN — POTASSIUM CHLORIDE 20 MEQ: 750 TABLET, EXTENDED RELEASE ORAL at 09:39

## 2021-01-30 RX ADMIN — FUROSEMIDE 40 MG: 40 TABLET ORAL at 16:22

## 2021-01-30 ASSESSMENT — ACTIVITIES OF DAILY LIVING (ADL)
ADLS_ACUITY_SCORE: 18

## 2021-01-30 NOTE — PLAN OF CARE
Patient is a 50 year old male admitted on 1/21/2021 with 3-vessel CAD s/p CABx3 requiring pre-operative IABP and intra-operative cardioversion x1 for afib w/ RVR. He re-developed a-flutter with RVR, BBB, and hemodynamic instability on POD#3, requiring hemodynamic support and IV amio, extubated 1/26, with intermittent afib and bradycardia episodes (HR 30s-40s) post-extubation. PMHx relevant for HLD, HTN, DM II, tobacco use, heavy alcohol use, ICM with 10-20% EF, CAD s/p PCI to RCA and LCx in 2010.    /62 (BP Location: Right arm, Cuff Size: Adult Regular)   Pulse 56   Temp 98.5  F (36.9  C) (Oral)   Resp 16   Wt 88.9 kg (195 lb 14.4 oz)   SpO2 94%   BMI 28.11 kg/m      Intake/Output Summary (Last 24 hours) at 1/30/2021 0658  Last data filed at 1/30/2021 0513  Gross per 24 hour   Intake 1201.07 ml   Output 1310 ml   Net -108.93 ml     A&Ox4. SR on tele, rates in the 60s. No episodes of bradycardia overnight. C/o midsternal incisional chest pain 4/10, improved with prn tylenol, 15 mg oxycodone x1 (pt reports he does not like how oxycodone makes him feel), & 25 mg tramadol x1. IV dilaudid available. Pain also managed with scheduled lido patches & robaxin. Sats 94% on 1L NC. C/o mild intermittent nonproductive cough & JAVED, denies SOB at rest. Voids w/o complications using bedside urinal. LBM 1/27, denies nausea or abdominal discomfort, +flatus. Regular diet, tolerating well. Carb counting & BG ACHS w/ sliding scale insulin coverage. Foam mepilex present on buttocks. Midline sternal incision approximated & HANH. Remaining CT removed on days yesterday, dressing C/D/I. No new skin deficits noted. PIV R arm, SL. PIV L arm infusing heparin gtt @ 1700 units/hr. Xa recheck at 0130 0.28, in therapeutic range, next recheck with morning labs. Activity as tolerated, ambulates SBA to assist of 1 using gait belt & walker.    Echo completed yesterday, showing LVEF 30-35%. CXR shows L pleural effusion & retrocardiac  atelectasis, as well as decreased pulmonary edema. Plan to continue diuresing. Amiodarone dose decreased to 400 mg daily in response to bradycardic episodes. Plan to bridge to warfarin from heparin gtt, needs warfarin teaching prior to discharge. Per team, may be medically stable for discharge to ARU by Sunday 1/31.     Noelle Marrero RN  6:58 AM

## 2021-01-30 NOTE — PLAN OF CARE
Admitted 1/21 for CABG X3 c/b A-fib with RVR and acute hypoxic respiratory failure during cardioversion. Medical history includes: HLD, HTN, DM2, tobacco use, heavy alcohol use, ICM w/ EF 10-20%, CAD s/p PCI (2010). VSS. Alert and oriented X 4. Lung sounds clear and diminished, weaning off oxygen. On 1 L NC while sleeping. Sinus rhythm. Heparin d/c'd. Mepolex to coccyx; patient repositioned every two hours for preservation of skin integrity. Scheduled and PRN pain medications for incisional chest pain. BM X 2 on shift. Continuing to monitor.

## 2021-01-30 NOTE — PROGRESS NOTES
1/30/2021   CVTS Progress Note  Attending provider: Jose Gibbons MD        S:  No acute issues over night. Patient denies  SOB, CP, fever, chills, sweats. Pain still an issue, even with oxycodone increased to 15 mg. Patient not taking oxycodone consistently. Will try dilaudid oral today and schedule dose.  Patient  Eating poorly. Encouraged supplements.  Ambulating in halls. +BM since surgery, none last 2 days. No arrhythmias.     O:   Vitals:    01/30/21 0400 01/30/21 0650 01/30/21 0722 01/30/21 0749   BP: 120/62  (!) 140/62 130/66   BP Location: Right arm  Right arm    Cuff Size: Adult Regular      Pulse: 56  64 61   Resp: 16  16    Temp: 98.5  F (36.9  C)  98.2  F (36.8  C)    TempSrc: Oral  Oral    SpO2: 94%  92%    Weight:  88.9 kg (195 lb 14.4 oz)       Vitals:    01/28/21 0000 01/29/21 0607 01/30/21 0650   Weight: 89.2 kg (196 lb 10.4 oz) 89.5 kg (197 lb 6.4 oz) 88.9 kg (195 lb 14.4 oz)       Intake/Output Summary (Last 24 hours) at 1/29/2021 1009  Last data filed at 1/29/2021 0700  Gross per 24 hour   Intake 1156.35 ml   Output 850 ml   Net 306.35 ml       Gen: AxOx3, NAD  CV: RRR, no murmurs, rubs, or gallops, HR 60's   Pulm: +CTA, no wheezing, no rhonchi  Abd: soft, NT, ND, +BS, no BM today  Ext: no LE edema  Incision: c/d/i, no erythema, sternal stable      Labs:   BMP  Recent Labs   Lab 01/30/21  0554 01/29/21  0548 01/28/21  1613 01/28/21  0343    135 134 134   POTASSIUM 3.9 4.2 3.8 4.0   CHLORIDE 100 100 97 101   RICHARD 8.8 8.7 8.4* 8.4*   CO2 32 28 30 27   BUN 24 28 27 26   CR 0.66 0.78 0.72 0.66   * 155* 204* 141*     CBC  Recent Labs   Lab 01/30/21  0554 01/29/21  0548 01/28/21  0343 01/27/21  0951   WBC 11.3* 10.8 8.3 9.0   RBC 3.12* 3.03* 3.06* 3.36*   HGB 9.8* 9.6* 9.7* 10.5*   HCT 30.2* 28.9* 29.1* 31.6*   MCV 97 95 95 94   MCH 31.4 31.7 31.7 31.3   MCHC 32.5 33.2 33.3 33.2   RDW 12.4 12.3 12.0 11.9    214 165 169     INR  Recent Labs   Lab 01/30/21  0554 01/29/21  0548  01/28/21  0343 01/27/21  0951   INR 2.31* 1.69* 1.28* 1.24*      Hepatic Panel   Lab Results   Component Value Date    AST 21 01/23/2021     Lab Results   Component Value Date    ALT 10 01/23/2021     No results found for: BILICONJ   Lab Results   Component Value Date    BILITOTAL 0.9 01/23/2021     Lab Results   Component Value Date    ALBUMIN 3.1 01/23/2021     Lab Results   Component Value Date    PROTTOTAL 5.9 01/23/2021      Lab Results   Component Value Date    ALKPHOS 62 01/23/2021       Recent Labs   Lab 01/30/21  0554 01/29/21  2257 01/29/21  1810 01/29/21  1144 01/29/21  0808 01/29/21  0548 01/29/21  0424 01/28/21  2347 01/28/21  1613 01/28/21  1613 01/28/21  0343 01/28/21  0343 01/27/21  1157 01/27/21  1157 01/27/21  0411 01/27/21  0411   *  --   --   --   --  155*  --   --   --  204*  --  141*  --  195*  --  184*   BGM  --  139* 137* 220* 156*  --  155* 124*   < >  --    < >  --    < >  --    < >  --     < > = values in this interval not displayed.         Imaging:   /29/21  8:46 AM [id number] [facility]    PACS Images     Show images for XR Chest 2 Views   Study Result    EXAMINATION:  XR CHEST 2 VW 1/29/2021 8:46 AM.     COMPARISON: 1/28/2021.     HISTORY:  s/p CAB     FINDINGS: PA and lateral views of the chest. Interval removal of the  right IJ central venous catheter. Postsurgical changes of CABG.  Midline trachea. Stable cardiomegaly. Pulmonary vasculature is  indistinct. Mild perihilar prominence. Decreased size of the left  pleural effusion. Decreased retrocardiac opacities. No pneumothorax.  Upper abdomen is unremarkable.                                                                      IMPRESSION:   1. Decreased size of the small left pleural effusion with associated  retrocardiac atelectasis.  2. Decreased pulmonary edema.     I have personally reviewed the examination and initial interpretation  and I agree with the findings.     MILAGRO MITCHELL MD          ASSESSMENT: Jaxon Melendez is a 50 year old male with PMH of HLD, HTN, DM II, tobacco use, heavy alcohol use, ICM with 10-20% EF, CAD s/p PCI to RCA and LCx in 2010, found to have triple vessel disease. Pre-op IABP, intra-op cardioversion x1, uneventful CABG x 3. IABP removed POD1. He was originally transferred to the floor on POD#2, on POD#3 he developed a-flutter with RVR, BBB, and hemodynamic stability. He was transferred back to the unit and required reintubation and cardioversion x 3 1/25. He required hemodynamic support and IV amiodarone. He was extubated 01/26  and has had intermittent afib post-extubation. He is currently on heparin drip and in sinus landy in the 50's.        PLAN:  Neuro/ pain/ sedation:  # Acute post-operative pain  Heavy alcohol use  - thiamine and folate  - gabapentin / hydromorphone / oxycodone / APAP  - ketorolac 60 mg x 6 h x 2 days  (1/27)   - schedule APAP, oxycodone to 15 mg q3, change to dilaudid 2-4 mg q3h and schedule dose. Start scheduled Robaxin and Lidoderm patches. PRN Tramadol.       Pulmonary care:   Re intubated 01/25 for acute hypoxic respiratory failure in setting of unstable tachycardia.  Extubated 01/26.  Now on nasal cannula.  - Titrate FiO2 for SpO2 >92%  - improved pulmonary edema, continue diureses.      Cardiovascular:    ICM, low EF  CAD  HTN  - IABP 1/22 overnight, removed 01/23  - Monitor hemodynamic status.   - ASA 81mg / atorvastatin 40 mg   - MAP goal > 65, off pressors  Rapid, wide complex tachycardia  cardioversion x 3 01/25/21, amiodarone bolus  - EP consult, recommends  amiodarone x 8 weeks  - amiodarone load again 01/27, transitioning IV to 400 BID. Decrease to 400 mg daily due to HR in 50's.   - anticoagulation with warfarin bridged with low intensity heparin infusion  - Echocardiogram today for post op baseline. EF 30-35%, no life vest per EP. Will follow up with Echo and Zio patch in 3 months   - Start lisinopril 5 mg daily 1/30     GI  /Nutrition:   Limited PO intake  - regular diet  - Bowel regimen with senna BID     Fluids/ Electrolytes/ Renal:   - Padron removed 01/27  - lasix diuresis 100 TID stopped 1/29, weight continues to trend down. Will start lasix 40 mg po bid for maintenance with low EF.    - Cr NL  - WT below baseline        Endocrine:    Stress hyperglycemia  DM2  PTA lantus (25 units)   - ISS  - resume lantus 12 units 1/29     ID/ Antibiotics:  Concern for aspiration pneumonia 01/24 overnight. No ongoing signs of infection.   - cefepime and vancomycin 7 day course (01/25-01/28/21)  - pan culture - Gram stain negative, NGTD on cultures, UA negative  - Perioperative antibiotics with cefazolin, complete     Heme:     # Acute blood loss anemia  - Hgb goal >7  - anticoagulation with heparin and coumadin. INr 2.31 today. Goal 2-3. Stopped heparin 1/30     :  Hypospadias  - padron out, urinating well.  - Pyridium tid.      Prophylaxis:    - SCDs  - Bowel regimen  - PPI  - warfarin      Lines/ tubes/ drains:  - PIV     Disposition:    - 6C since 1/28  - inpatient for pain control  - possible discharge Sunday, home vs. MICHAELLE Mack PA-C  Cardiothoracic Surgery   Pager [phone]  January 30, 2021

## 2021-01-30 NOTE — PLAN OF CARE
D/He continues on scheduled tylenol, Robaxin, and Dilaudid. He is up in the chair and reports he is doing his I/S, on RA and has Mepilex on bottom per report for cysts that need surgery later.

## 2021-01-30 NOTE — PROGRESS NOTES
Care Management Follow Up    Length of Stay (days): 9    Expected Discharge Date: 01/31/21     Concerns to be Addressed: Discharge Planning     Patient plan of care discussed at interdisciplinary rounds: Yes-Bedside RN, [facility] and [facility]    Anticipated Discharge Disposition:  ARU     Anticipated Discharge Services: HE transportation   Anticipated Discharge DME:      Patient/family educated on Medicare website which has current facility and service quality ratings: yes  Education Provided on the Discharge Plan:  Yes   Patient/Family in Agreement with the Plan:  Pt and dtrJonathan, are in agreement w/ transition to  ARU.     Referrals Placed by CM/SW:   ARU  Private pay costs discussed: Not applicable    Additional Information:   rehab has been assessing pt for ARU level of care. Discussed with [facility] admissions and pt is still appropriate for ARU level of care. Clinicals submitted to pt's insurance and waiting for insurance approval; do not anticipate receiving insurance approval over the weekend. Writer updated pt and his dtr, Jonathan, via phone ([phone]) and both in agreement w/ discharge plan.       Maryan Pizarro, JANESSW

## 2021-01-31 ENCOUNTER — APPOINTMENT (OUTPATIENT)
Dept: PHYSICAL THERAPY | Facility: CLINIC | Age: 51
DRG: 235 | End: 2021-01-31
Attending: SURGERY
Payer: COMMERCIAL

## 2021-01-31 ENCOUNTER — APPOINTMENT (OUTPATIENT)
Dept: OCCUPATIONAL THERAPY | Facility: CLINIC | Age: 51
DRG: 235 | End: 2021-01-31
Attending: SURGERY
Payer: COMMERCIAL

## 2021-01-31 LAB
ANION GAP SERPL CALCULATED.3IONS-SCNC: 4 MMOL/L (ref 3–14)
BACTERIA SPEC CULT: NO GROWTH
BACTERIA SPEC CULT: NO GROWTH
BUN SERPL-MCNC: 14 MG/DL (ref 7–30)
CALCIUM SERPL-MCNC: 8.6 MG/DL (ref 8.5–10.1)
CHLORIDE SERPL-SCNC: 100 MMOL/L (ref 94–109)
CO2 SERPL-SCNC: 31 MMOL/L (ref 20–32)
CREAT SERPL-MCNC: 0.62 MG/DL (ref 0.66–1.25)
ERYTHROCYTE [DISTWIDTH] IN BLOOD BY AUTOMATED COUNT: 12.6 % (ref 10–15)
GFR SERPL CREATININE-BSD FRML MDRD: >90 ML/MIN/{1.73_M2}
GLUCOSE BLDC GLUCOMTR-MCNC: 144 MG/DL (ref 70–99)
GLUCOSE BLDC GLUCOMTR-MCNC: 144 MG/DL (ref 70–99)
GLUCOSE BLDC GLUCOMTR-MCNC: 188 MG/DL (ref 70–99)
GLUCOSE BLDC GLUCOMTR-MCNC: 371 MG/DL (ref 70–99)
GLUCOSE SERPL-MCNC: 124 MG/DL (ref 70–99)
HCT VFR BLD AUTO: 29.7 % (ref 40–53)
HGB BLD-MCNC: 9.4 G/DL (ref 13.3–17.7)
INR PPP: 2.67 (ref 0.86–1.14)
LABORATORY COMMENT REPORT: NORMAL
Lab: NORMAL
MCH RBC QN AUTO: 30.4 PG (ref 26.5–33)
MCHC RBC AUTO-ENTMCNC: 31.6 G/DL (ref 31.5–36.5)
MCV RBC AUTO: 96 FL (ref 78–100)
PLATELET # BLD AUTO: 274 10E9/L (ref 150–450)
POTASSIUM SERPL-SCNC: 3.9 MMOL/L (ref 3.4–5.3)
RBC # BLD AUTO: 3.09 10E12/L (ref 4.4–5.9)
SARS-COV-2 RNA RESP QL NAA+PROBE: NEGATIVE
SODIUM SERPL-SCNC: 135 MMOL/L (ref 133–144)
SPECIMEN SOURCE: NORMAL
UFH PPP CHRO-ACNC: <0.1 IU/ML
WBC # BLD AUTO: 9.9 10E9/L (ref 4–11)

## 2021-01-31 PROCEDURE — 250N000013 HC RX MED GY IP 250 OP 250 PS 637: Performed by: PHYSICIAN ASSISTANT

## 2021-01-31 PROCEDURE — 85520 HEPARIN ASSAY: CPT | Performed by: SURGERY

## 2021-01-31 PROCEDURE — 97535 SELF CARE MNGMENT TRAINING: CPT | Mod: GO

## 2021-01-31 PROCEDURE — 250N000012 HC RX MED GY IP 250 OP 636 PS 637: Performed by: SURGERY

## 2021-01-31 PROCEDURE — 999N001017 HC STATISTIC GLUCOSE BY METER IP

## 2021-01-31 PROCEDURE — 80048 BASIC METABOLIC PNL TOTAL CA: CPT | Performed by: SURGERY

## 2021-01-31 PROCEDURE — 250N000013 HC RX MED GY IP 250 OP 250 PS 637: Performed by: NURSE PRACTITIONER

## 2021-01-31 PROCEDURE — 85027 COMPLETE CBC AUTOMATED: CPT | Performed by: SURGERY

## 2021-01-31 PROCEDURE — 250N000013 HC RX MED GY IP 250 OP 250 PS 637: Performed by: STUDENT IN AN ORGANIZED HEALTH CARE EDUCATION/TRAINING PROGRAM

## 2021-01-31 PROCEDURE — 85610 PROTHROMBIN TIME: CPT | Performed by: SURGERY

## 2021-01-31 PROCEDURE — 36415 COLL VENOUS BLD VENIPUNCTURE: CPT | Performed by: SURGERY

## 2021-01-31 PROCEDURE — U0005 INFEC AGEN DETEC AMPLI PROBE: HCPCS | Performed by: PHYSICIAN ASSISTANT

## 2021-01-31 PROCEDURE — 97530 THERAPEUTIC ACTIVITIES: CPT | Mod: GO

## 2021-01-31 PROCEDURE — 97116 GAIT TRAINING THERAPY: CPT | Mod: GP | Performed by: PHYSICAL THERAPIST

## 2021-01-31 PROCEDURE — U0003 INFECTIOUS AGENT DETECTION BY NUCLEIC ACID (DNA OR RNA); SEVERE ACUTE RESPIRATORY SYNDROME CORONAVIRUS 2 (SARS-COV-2) (CORONAVIRUS DISEASE [COVID-19]), AMPLIFIED PROBE TECHNIQUE, MAKING USE OF HIGH THROUGHPUT TECHNOLOGIES AS DESCRIBED BY CMS-2020-01-R: HCPCS | Performed by: PHYSICIAN ASSISTANT

## 2021-01-31 PROCEDURE — 214N000001 HC R&B CCU UMMC

## 2021-01-31 PROCEDURE — 97530 THERAPEUTIC ACTIVITIES: CPT | Mod: GP | Performed by: PHYSICAL THERAPIST

## 2021-01-31 PROCEDURE — 250N000013 HC RX MED GY IP 250 OP 250 PS 637: Performed by: SURGERY

## 2021-01-31 RX ORDER — WARFARIN SODIUM 1 MG/1
2 TABLET ORAL
Status: COMPLETED | OUTPATIENT
Start: 2021-01-31 | End: 2021-01-31

## 2021-01-31 RX ADMIN — HYDROMORPHONE HYDROCHLORIDE 4 MG: 2 TABLET ORAL at 09:09

## 2021-01-31 RX ADMIN — FUROSEMIDE 40 MG: 40 TABLET ORAL at 07:47

## 2021-01-31 RX ADMIN — MULTIPLE VITAMINS W/ MINERALS TAB 1 TABLET: TAB at 07:47

## 2021-01-31 RX ADMIN — ACETAMINOPHEN 975 MG: 325 TABLET, FILM COATED ORAL at 16:11

## 2021-01-31 RX ADMIN — HYDROMORPHONE HYDROCHLORIDE 4 MG: 2 TABLET ORAL at 14:44

## 2021-01-31 RX ADMIN — ACETAMINOPHEN 975 MG: 325 TABLET, FILM COATED ORAL at 07:45

## 2021-01-31 RX ADMIN — THIAMINE HCL TAB 100 MG 100 MG: 100 TAB at 07:48

## 2021-01-31 RX ADMIN — HYDROMORPHONE HYDROCHLORIDE 4 MG: 2 TABLET ORAL at 17:50

## 2021-01-31 RX ADMIN — LIDOCAINE 2 PATCH: 560 PATCH PERCUTANEOUS; TOPICAL; TRANSDERMAL at 07:42

## 2021-01-31 RX ADMIN — POTASSIUM CHLORIDE 20 MEQ: 750 TABLET, EXTENDED RELEASE ORAL at 07:46

## 2021-01-31 RX ADMIN — HYDROMORPHONE HYDROCHLORIDE 4 MG: 2 TABLET ORAL at 05:58

## 2021-01-31 RX ADMIN — WARFARIN SODIUM 2 MG: 1 TABLET ORAL at 17:50

## 2021-01-31 RX ADMIN — FOLIC ACID 1 MG: 1 TABLET ORAL at 07:48

## 2021-01-31 RX ADMIN — METHOCARBAMOL 500 MG: 500 TABLET, FILM COATED ORAL at 20:18

## 2021-01-31 RX ADMIN — ATORVASTATIN CALCIUM 40 MG: 40 TABLET, FILM COATED ORAL at 07:46

## 2021-01-31 RX ADMIN — INSULIN ASPART 10 UNITS: 100 INJECTION, SOLUTION INTRAVENOUS; SUBCUTANEOUS at 12:16

## 2021-01-31 RX ADMIN — HYDROMORPHONE HYDROCHLORIDE 4 MG: 2 TABLET ORAL at 20:32

## 2021-01-31 RX ADMIN — FUROSEMIDE 40 MG: 40 TABLET ORAL at 16:11

## 2021-01-31 RX ADMIN — METHOCARBAMOL 500 MG: 500 TABLET, FILM COATED ORAL at 07:47

## 2021-01-31 RX ADMIN — METHOCARBAMOL 500 MG: 500 TABLET, FILM COATED ORAL at 16:11

## 2021-01-31 RX ADMIN — AMIODARONE HYDROCHLORIDE 400 MG: 200 TABLET ORAL at 07:46

## 2021-01-31 RX ADMIN — HYDROMORPHONE HYDROCHLORIDE 4 MG: 2 TABLET ORAL at 12:06

## 2021-01-31 RX ADMIN — METHOCARBAMOL 750 MG: 750 TABLET, FILM COATED ORAL at 06:02

## 2021-01-31 RX ADMIN — POTASSIUM CHLORIDE 20 MEQ: 750 TABLET, EXTENDED RELEASE ORAL at 20:17

## 2021-01-31 RX ADMIN — LISINOPRIL 5 MG: 5 TABLET ORAL at 07:45

## 2021-01-31 RX ADMIN — INSULIN ASPART 1 UNITS: 100 INJECTION, SOLUTION INTRAVENOUS; SUBCUTANEOUS at 08:32

## 2021-01-31 RX ADMIN — HYDROMORPHONE HYDROCHLORIDE 4 MG: 2 TABLET ORAL at 03:02

## 2021-01-31 RX ADMIN — ASPIRIN 81 MG: 81 TABLET ORAL at 07:45

## 2021-01-31 RX ADMIN — PANTOPRAZOLE SODIUM 40 MG: 40 TABLET, DELAYED RELEASE ORAL at 07:47

## 2021-01-31 RX ADMIN — METHOCARBAMOL 500 MG: 500 TABLET, FILM COATED ORAL at 12:20

## 2021-01-31 ASSESSMENT — ACTIVITIES OF DAILY LIVING (ADL)
ADLS_ACUITY_SCORE: 18

## 2021-01-31 NOTE — PROGRESS NOTES
Care Management Follow Up    Length of Stay (days): 10    Expected Discharge Date: 02/01/21     Concerns to be Addressed: Disposition planning  Patient plan of care discussed at interdisciplinary rounds: Yes    Anticipated Discharge Disposition: Acute Rehab placement     Anticipated Discharge Services:  Acute Rehab placement  Anticipated Discharge DME:  Not applicable    Patient/family educated on Medicare website which has current facility and service quality ratings: yes  Education Provided on the Discharge Plan:  Yes  Patient/Family in Agreement with the Plan:  Yes    Referrals Placed by CM/SW:  Acute rehab referrals made to [facility] and [facility]  Private pay costs discussed: Not applicable at this time    Additional Information:  SW is following pt for discharge planning.  OT and Physical Therapy are recommending acute rehab placement.  Placement is being pursued at [facility] and [facility].  Prior auth is required from insurance for ARU stay.  Pt's insurance company is not open on the weekend and thus, prior auth cannot be obtained from insurance until 2/1/2021.      SW updated JASKARAN Monahan P.A. .  SW requested a new covid screen as pt's last screen was completed on 1/19/2021.        SW spoke with  ARU Admissions ([place]) who confirmed that a new covid is required.      SW will continue to follow for discharge planning.    MARTIN Smith  Social Work, 6A  Phone:  [phone]  Pager:  [phone]  1/31/2021          BENEDICTO Rodriguez

## 2021-01-31 NOTE — PROGRESS NOTES
1/31/2021   CVTS Progress Note  Attending provider: Jose Gibbons MD        S:  No acute issues over night. Patient denies  SOB, CP, fever, chills, sweats. Pain improved with oral dilaudid.   Patient  Eating poorly. Encouraged supplements.  Ambulating in halls. +BM last evening. No arrhythmias.     O:   Vitals:    01/31/21 0300 01/31/21 0600 01/31/21 0802 01/31/21 1120   BP: 123/66  120/56 114/54   BP Location: Right arm  Right arm Right arm   Cuff Size:   Adult Regular Adult Regular   Pulse: 61  60    Resp: 18  16 16   Temp: 98.1  F (36.7  C)  98  F (36.7  C) 98.1  F (36.7  C)   TempSrc:   Oral Oral   SpO2: 96%  93% 93%   Weight:  88 kg (193 lb 14.4 oz)       Vitals:    01/29/21 0607 01/30/21 0650 01/31/21 0600   Weight: 89.5 kg (197 lb 6.4 oz) 88.9 kg (195 lb 14.4 oz) 88 kg (193 lb 14.4 oz)       Intake/Output Summary (Last 24 hours) at 1/29/2021 1009  Last data filed at 1/29/2021 0700  Gross per 24 hour   Intake 1156.35 ml   Output 850 ml   Net 306.35 ml       Gen: AxOx3, NAD  CV: RRR, no murmurs, rubs, or gallops, HR 60's   Pulm: +CTA, no wheezing, no rhonchi  Abd: soft, NT, ND, +BS, + BM today  Ext: no LE edema  Incision: c/d/i, no erythema, sternal stable      Labs:   BMP  Recent Labs   Lab 01/31/21  0517 01/30/21  0554 01/29/21  0548 01/28/21  1613    135 135 134   POTASSIUM 3.9 3.9 4.2 3.8   CHLORIDE 100 100 100 97   RICHARD 8.6 8.8 8.7 8.4*   CO2 31 32 28 30   BUN 14 24 28 27   CR 0.62* 0.66 0.78 0.72   * 145* 155* 204*     CBC  Recent Labs   Lab 01/31/21  0517 01/30/21  0554 01/29/21  0548 01/28/21  0343   WBC 9.9 11.3* 10.8 8.3   RBC 3.09* 3.12* 3.03* 3.06*   HGB 9.4* 9.8* 9.6* 9.7*   HCT 29.7* 30.2* 28.9* 29.1*   MCV 96 97 95 95   MCH 30.4 31.4 31.7 31.7   MCHC 31.6 32.5 33.2 33.3   RDW 12.6 12.4 12.3 12.0    254 214 165     INR  Recent Labs   Lab 01/31/21  0517 01/30/21  0554 01/29/21  0548 01/28/21  0343   INR 2.67* 2.31* 1.69* 1.28*      Hepatic Panel   Lab Results   Component Value  Date    AST 21 2021     Lab Results   Component Value Date    ALT 10 2021     No results found for: BILICONJ   Lab Results   Component Value Date    BILITOTAL 0.9 2021     Lab Results   Component Value Date    ALBUMIN 3.1 2021     Lab Results   Component Value Date    PROTTOTAL 5.9 2021      Lab Results   Component Value Date    ALKPHOS 62 2021       Recent Labs   Lab 21  1214 21  0750 21  0517 21  0302 21  2211 21  1816 21  1053 21  0554 21  0548 21  0548 21  1613 21  1613 21  0343 21  0343 21  1157 21  1157   GLC  --   --  124*  --   --   --   --  145*  --  155*  --  204*  --  141*  --  195*   * 144*  --  144* 149* 144* 143*  --    < >  --    < >  --    < >  --    < >  --     < > = values in this interval not displayed.         Imagin21  8:46 AM [id number] [facility]    PACS Images     Show images for XR Chest 2 Views   Study Result    EXAMINATION:  XR CHEST 2 VW 2021 8:46 AM.     COMPARISON: 2021.     HISTORY:  s/p CAB     FINDINGS: PA and lateral views of the chest. Interval removal of the  right IJ central venous catheter. Postsurgical changes of CABG.  Midline trachea. Stable cardiomegaly. Pulmonary vasculature is  indistinct. Mild perihilar prominence. Decreased size of the left  pleural effusion. Decreased retrocardiac opacities. No pneumothorax.  Upper abdomen is unremarkable.                                                                      IMPRESSION:   1. Decreased size of the small left pleural effusion with associated  retrocardiac atelectasis.  2. Decreased pulmonary edema.     I have personally reviewed the examination and initial interpretation  and I agree with the findings.     MILAGRO MITCHELL MD           ASSESSMENT: Jaxon Melendez is a 50 year old male with PMH of HLD, HTN, DM II, tobacco use, heavy alcohol use, ICM  with 10-20% EF, CAD s/p PCI to RCA and LCx in 2010, found to have triple vessel disease. Pre-op IABP, intra-op cardioversion x1, uneventful CABG x 3. IABP removed POD1. He was originally transferred to the floor on POD#2, on POD#3 he developed a-flutter with RVR, BBB, and hemodynamic stability. He was transferred back to the unit and required reintubation and cardioversion x 3 1/25. He required hemodynamic support and IV amiodarone. He was extubated 01/26  and has had intermittent afib post-extubation. He is currently on heparin drip and in sinus landy in the 50's.        PLAN:  Neuro/ pain/ sedation:  # Acute post-operative pain  Heavy alcohol use  - thiamine and folate  - gabapentin / hydromorphone / oxycodone / APAP  - ketorolac 60 mg x 6 h x 2 days  (1/27)   - schedule APAP, stopped oxycodone to 15 mg q3,  dilaudid 4 mg q3h and schedule dose. Start scheduled Robaxin and Lidoderm patches. PRN Tramadol.       Pulmonary care:   Re intubated 01/25 for acute hypoxic respiratory failure in setting of unstable tachycardia.  Extubated 01/26.  Now on nasal cannula.  - Titrate FiO2 for SpO2 >92%  - improved pulmonary edema, continue diureses.      Cardiovascular:    ICM, low EF  CAD  HTN  - IABP 1/22 overnight, removed 01/23  - Monitor hemodynamic status.   - ASA 81mg / atorvastatin 40 mg   - MAP goal > 65, off pressors  Rapid, wide complex tachycardia  cardioversion x 3 01/25/21, amiodarone bolus  - EP consult, recommends  amiodarone x 8 weeks  - amiodarone load again 01/27, transitioning IV to 400 BID. Decreased to 400 mg daily due to HR in 50's 1/29.   - anticoagulation with warfarin bridged with low intensity heparin infusion  - Echocardiogram today for post op baseline. EF 30-35%, no life vest per EP. Will follow up with Echo in 3 months   - Start lisinopril 5 mg daily 1/30     GI /Nutrition:   Limited PO intake  - regular diet  - Bowel regimen with senna BID     Fluids/ Electrolytes/ Renal:   - Blackwell removed  01/27  - lasix diuresis 100 TID stopped 1/29, weight continues to trend down. Will start lasix 40 mg po bid for maintenance with low EF.    - Cr NL  - WT below baseline        Endocrine:    Stress hyperglycemia  DM2  PTA lantus (25 units)   - ISS  - resume lantus 12 units 1/29     ID/ Antibiotics:  Concern for aspiration pneumonia 01/24 overnight. No ongoing signs of infection.   - cefepime and vancomycin 7 day course (01/25-01/28/21)  - pan culture - Gram stain negative, NGTD on cultures, UA negative  - Perioperative antibiotics with cefazolin, complete     Heme:     # Acute blood loss anemia  - Hgb goal >7  - anticoagulation with heparin and coumadin. INr 2.67 today. Goal 2-3. Stopped heparin 1/30     :  Hypospadias  - padron out, urinating well.  - Pyridium tid.      Prophylaxis:    - SCDs  - Bowel regimen  - PPI  - warfarin      Lines/ tubes/ drains:  - PIV     Disposition:    - 6C since 1/28  -  discharge Monday to ARU, unable to get Auth over weekend and also needed better pain control.      Carmen Mack PA-C  Cardiothoracic Surgery   Pager [phone]  January 31, 2021

## 2021-01-31 NOTE — PLAN OF CARE
D: Patient was admitted on 1/21/2021 for CAD.  S/p CABG x3 on 1/22/21 c/b pneumonia and afib/flutter POD #3 s/p DCCV (cardioversion) x3. PMH ICM EF 10-20%, CAD s/p PCI 2010, HTN, HLD, DM2, tobacco and ETOH abuse    I: Monitored vitals and assessed patient status. His mepilex dressing on his bottom was changed since it was draining.and a new lower small one was placed. He gets cysts that open and drain. He was told to turn  often.   Running: R and L piv are saline locked    A: Patient's vital signs have been stable. His pain is controlled with his scheduled Tylenol, Dilaudid and Robaxin.His rhythm was SB/SR 55-80 with 0-8 PVC's/min, 0-2 PVC couplet's/min, a rare PVC triplet, and 0-3 PAC's/min and has an occasional junctional beat    I/O this shift:  In: 440 [P.O.:440]  Out: 500 [Urine:500]    Temp:  [98  F (36.7  C)-98.4  F (36.9  C)] 98.1  F (36.7  C)  Pulse:  [58-66] 60  Resp:  [16-18] 16  BP: (114-132)/(54-66) 114/54  SpO2:  [93 %-98 %] 93 %      P: Continue to monitor patient status and report changes to the CVTS treatment team.

## 2021-01-31 NOTE — PLAN OF CARE
Temp: 98.1  F (36.7  C) Temp src: Oral BP: 123/66 Pulse: 61   Resp: 18 SpO2: 96 % O2 Device: Nasal cannula Oxygen Delivery: 1 LPM    D: S/p CABG x3 on 1/22/21 c/b pneumonia and afib/flutter s/p DCCV x3. Hx ICM EF 10-20%, CAD s/p PCI 2010, HTN, HLD, DM2, tobacco, ETOH.    I/A: Monitored vitals and assessed pt status. A&O x4. VSS see flowsheet. SB/SR. RA-1L O2. Reports incisional pain, scheduled tylenol and dilaudid. Ice packs applied. Sternal incision with serosanguinous  Drainage, gauze dressing applied. Left leg harvest site CDI. Voiding adequate amount in bedside urinal. Up SBA. Poor sleep overnight.    P: PLC coumadin teaching Monday 1045. Plan to discharge to [facility] pending insurance approval. Plan for OP ziopatch in 3 months. Continue to monitor and follow POC. Notify CVTS with changes.

## 2021-01-31 NOTE — PLAN OF CARE
D/he continues on scheduled Tylenol, Robaxin and Dilaudid. (Last evening I discussed the two choices for doses,and he took 2 mg twice and then wanted 4 mg at 0000). Today he tells me that he wants to take 4 mg all the time, as it helps, and he talked to surgery about this also. He has Mepilex on buttocks (changed on days that cover ulcers that need future surgery I was told) He looks sad, and we chatted for a while as he sat in the chair. He says he will eat a light supper later tonight.   A/incision is healing  I/I showed him how to get up more effectively from the chair to the bed and he demonstrated this  P/monitor for changes. He says he is going to rehab on Monday

## 2021-02-01 ENCOUNTER — APPOINTMENT (OUTPATIENT)
Dept: PHYSICAL THERAPY | Facility: CLINIC | Age: 51
DRG: 235 | End: 2021-02-01
Attending: SURGERY
Payer: COMMERCIAL

## 2021-02-01 ENCOUNTER — HOSPITAL ENCOUNTER (INPATIENT)
Facility: CLINIC | Age: 51
LOS: 4 days | Discharge: HOME | DRG: 949 | End: 2021-02-05
Attending: PHYSICAL MEDICINE & REHABILITATION | Admitting: PHYSICAL MEDICINE & REHABILITATION
Payer: COMMERCIAL

## 2021-02-01 VITALS
HEART RATE: 65 BPM | TEMPERATURE: 98.3 F | OXYGEN SATURATION: 92 % | RESPIRATION RATE: 16 BRPM | BODY MASS INDEX: 27.69 KG/M2 | DIASTOLIC BLOOD PRESSURE: 55 MMHG | WEIGHT: 193 LBS | SYSTOLIC BLOOD PRESSURE: 123 MMHG

## 2021-02-01 DIAGNOSIS — Z95.1 H/O THREE VESSEL CORONARY ARTERY BYPASS: Primary | ICD-10-CM

## 2021-02-01 DIAGNOSIS — Z95.1 S/P CABG (CORONARY ARTERY BYPASS GRAFT): ICD-10-CM

## 2021-02-01 DIAGNOSIS — R41.89 COGNITIVE DEFICITS: ICD-10-CM

## 2021-02-01 DIAGNOSIS — L73.2 HIDRADENITIS SUPPURATIVA: ICD-10-CM

## 2021-02-01 DIAGNOSIS — E11.9 TYPE 2 DIABETES MELLITUS WITHOUT COMPLICATION, WITHOUT LONG-TERM CURRENT USE OF INSULIN (H): ICD-10-CM

## 2021-02-01 DIAGNOSIS — I25.118 CORONARY ARTERY DISEASE INVOLVING NATIVE CORONARY ARTERY OF NATIVE HEART WITH OTHER FORM OF ANGINA PECTORIS (H): ICD-10-CM

## 2021-02-01 DIAGNOSIS — I25.5 ISCHEMIC CARDIOMYOPATHY: ICD-10-CM

## 2021-02-01 LAB
ANION GAP SERPL CALCULATED.3IONS-SCNC: 5 MMOL/L (ref 3–14)
BUN SERPL-MCNC: 11 MG/DL (ref 7–30)
CALCIUM SERPL-MCNC: 8.9 MG/DL (ref 8.5–10.1)
CHLORIDE SERPL-SCNC: 101 MMOL/L (ref 94–109)
CO2 SERPL-SCNC: 30 MMOL/L (ref 20–32)
CREAT SERPL-MCNC: 0.62 MG/DL (ref 0.66–1.25)
ERYTHROCYTE [DISTWIDTH] IN BLOOD BY AUTOMATED COUNT: 12.6 % (ref 10–15)
GFR SERPL CREATININE-BSD FRML MDRD: >90 ML/MIN/{1.73_M2}
GLUCOSE BLDC GLUCOMTR-MCNC: 119 MG/DL (ref 70–99)
GLUCOSE BLDC GLUCOMTR-MCNC: 119 MG/DL (ref 70–99)
GLUCOSE BLDC GLUCOMTR-MCNC: 128 MG/DL (ref 70–99)
GLUCOSE BLDC GLUCOMTR-MCNC: 192 MG/DL (ref 70–99)
GLUCOSE BLDC GLUCOMTR-MCNC: 197 MG/DL (ref 70–99)
GLUCOSE SERPL-MCNC: 119 MG/DL (ref 70–99)
HCT VFR BLD AUTO: 31.4 % (ref 40–53)
HGB BLD-MCNC: 10.2 G/DL (ref 13.3–17.7)
INR PPP: 2.69 (ref 0.86–1.14)
MCH RBC QN AUTO: 31.4 PG (ref 26.5–33)
MCHC RBC AUTO-ENTMCNC: 32.5 G/DL (ref 31.5–36.5)
MCV RBC AUTO: 97 FL (ref 78–100)
PLATELET # BLD AUTO: 303 10E9/L (ref 150–450)
POTASSIUM SERPL-SCNC: 4.3 MMOL/L (ref 3.4–5.3)
RBC # BLD AUTO: 3.25 10E12/L (ref 4.4–5.9)
SODIUM SERPL-SCNC: 136 MMOL/L (ref 133–144)
WBC # BLD AUTO: 11.6 10E9/L (ref 4–11)

## 2021-02-01 PROCEDURE — 250N000013 HC RX MED GY IP 250 OP 250 PS 637: Performed by: STUDENT IN AN ORGANIZED HEALTH CARE EDUCATION/TRAINING PROGRAM

## 2021-02-01 PROCEDURE — 97530 THERAPEUTIC ACTIVITIES: CPT | Mod: GP | Performed by: PHYSICAL THERAPIST

## 2021-02-01 PROCEDURE — 80048 BASIC METABOLIC PNL TOTAL CA: CPT | Performed by: SURGERY

## 2021-02-01 PROCEDURE — 250N000013 HC RX MED GY IP 250 OP 250 PS 637: Performed by: PHYSICIAN ASSISTANT

## 2021-02-01 PROCEDURE — 250N000013 HC RX MED GY IP 250 OP 250 PS 637: Performed by: NURSE PRACTITIONER

## 2021-02-01 PROCEDURE — 99223 1ST HOSP IP/OBS HIGH 75: CPT | Mod: GC | Performed by: PHYSICAL MEDICINE & REHABILITATION

## 2021-02-01 PROCEDURE — 85027 COMPLETE CBC AUTOMATED: CPT | Performed by: SURGERY

## 2021-02-01 PROCEDURE — G0463 HOSPITAL OUTPT CLINIC VISIT: HCPCS

## 2021-02-01 PROCEDURE — 999N001017 HC STATISTIC GLUCOSE BY METER IP

## 2021-02-01 PROCEDURE — 250N000013 HC RX MED GY IP 250 OP 250 PS 637: Performed by: SURGERY

## 2021-02-01 PROCEDURE — 128N000003 HC R&B REHAB

## 2021-02-01 PROCEDURE — 36415 COLL VENOUS BLD VENIPUNCTURE: CPT | Performed by: SURGERY

## 2021-02-01 PROCEDURE — 85610 PROTHROMBIN TIME: CPT | Performed by: SURGERY

## 2021-02-01 PROCEDURE — 250N000013 HC RX MED GY IP 250 OP 250 PS 637: Performed by: PHYSICAL MEDICINE & REHABILITATION

## 2021-02-01 PROCEDURE — 97116 GAIT TRAINING THERAPY: CPT | Mod: GP | Performed by: PHYSICAL THERAPIST

## 2021-02-01 RX ORDER — POLYETHYLENE GLYCOL 3350 17 G/17G
17 POWDER, FOR SOLUTION ORAL DAILY PRN
Status: DISCONTINUED | OUTPATIENT
Start: 2021-02-01 | End: 2021-02-05 | Stop reason: HOSPADM

## 2021-02-01 RX ORDER — LANOLIN ALCOHOL/MO/W.PET/CERES
100 CREAM (GRAM) TOPICAL DAILY
Status: ON HOLD | DISCHARGE
Start: 2021-02-02 | End: 2021-02-03

## 2021-02-01 RX ORDER — ALBUTEROL SULFATE 90 UG/1
2 AEROSOL, METERED RESPIRATORY (INHALATION) EVERY 6 HOURS PRN
DISCHARGE
Start: 2021-02-01

## 2021-02-01 RX ORDER — WARFARIN SODIUM 2 MG/1
TABLET ORAL
Status: ON HOLD | DISCHARGE
Start: 2021-02-01 | End: 2021-02-03

## 2021-02-01 RX ORDER — LIDOCAINE 4 G/G
1-3 PATCH TOPICAL
Status: DISCONTINUED | OUTPATIENT
Start: 2021-02-02 | End: 2021-02-05 | Stop reason: HOSPADM

## 2021-02-01 RX ORDER — ALBUTEROL SULFATE 90 UG/1
2 AEROSOL, METERED RESPIRATORY (INHALATION) EVERY 6 HOURS PRN
Status: DISCONTINUED | OUTPATIENT
Start: 2021-02-01 | End: 2021-02-05 | Stop reason: HOSPADM

## 2021-02-01 RX ORDER — MULTIPLE VITAMINS W/ MINERALS TAB 9MG-400MCG
1 TAB ORAL DAILY
Status: ON HOLD | DISCHARGE
Start: 2021-02-02 | End: 2021-02-03

## 2021-02-01 RX ORDER — HYDROMORPHONE HYDROCHLORIDE 2 MG/1
2-4 TABLET ORAL EVERY 4 HOURS PRN
Refills: 0 | Status: ON HOLD | DISCHARGE
Start: 2021-02-01 | End: 2021-02-03

## 2021-02-01 RX ORDER — NALOXONE HYDROCHLORIDE 0.4 MG/ML
0.4 INJECTION, SOLUTION INTRAMUSCULAR; INTRAVENOUS; SUBCUTANEOUS
Status: DISCONTINUED | OUTPATIENT
Start: 2021-02-01 | End: 2021-02-05 | Stop reason: HOSPADM

## 2021-02-01 RX ORDER — LISINOPRIL 2.5 MG/1
5 TABLET ORAL DAILY
Status: DISCONTINUED | OUTPATIENT
Start: 2021-02-02 | End: 2021-02-05 | Stop reason: HOSPADM

## 2021-02-01 RX ORDER — AMIODARONE HYDROCHLORIDE 200 MG/1
400 TABLET ORAL DAILY
Status: DISCONTINUED | OUTPATIENT
Start: 2021-02-02 | End: 2021-02-05 | Stop reason: HOSPADM

## 2021-02-01 RX ORDER — NALOXONE HYDROCHLORIDE 0.4 MG/ML
0.2 INJECTION, SOLUTION INTRAMUSCULAR; INTRAVENOUS; SUBCUTANEOUS
Status: DISCONTINUED | OUTPATIENT
Start: 2021-02-01 | End: 2021-02-05 | Stop reason: HOSPADM

## 2021-02-01 RX ORDER — PANTOPRAZOLE SODIUM 40 MG/1
40 TABLET, DELAYED RELEASE ORAL
Status: DISCONTINUED | OUTPATIENT
Start: 2021-02-02 | End: 2021-02-05 | Stop reason: HOSPADM

## 2021-02-01 RX ORDER — LANOLIN ALCOHOL/MO/W.PET/CERES
100 CREAM (GRAM) TOPICAL DAILY
Status: DISCONTINUED | OUTPATIENT
Start: 2021-02-02 | End: 2021-02-05 | Stop reason: HOSPADM

## 2021-02-01 RX ORDER — HYDROMORPHONE HYDROCHLORIDE 2 MG/1
2-4 TABLET ORAL EVERY 4 HOURS PRN
Status: DISCONTINUED | OUTPATIENT
Start: 2021-02-01 | End: 2021-02-05 | Stop reason: HOSPADM

## 2021-02-01 RX ORDER — ACETAMINOPHEN 325 MG/1
975 TABLET ORAL 3 TIMES DAILY
Status: ON HOLD | DISCHARGE
Start: 2021-02-01 | End: 2021-02-04

## 2021-02-01 RX ORDER — FOLIC ACID 1 MG/1
1 TABLET ORAL DAILY
Status: ON HOLD | DISCHARGE
Start: 2021-02-02 | End: 2021-02-03

## 2021-02-01 RX ORDER — NICOTINE POLACRILEX 4 MG
15-30 LOZENGE BUCCAL
Status: DISCONTINUED | OUTPATIENT
Start: 2021-02-01 | End: 2021-02-05 | Stop reason: HOSPADM

## 2021-02-01 RX ORDER — AMIODARONE HYDROCHLORIDE 200 MG/1
200 TABLET ORAL DAILY
Status: DISCONTINUED | OUTPATIENT
Start: 2021-02-08 | End: 2021-02-05 | Stop reason: HOSPADM

## 2021-02-01 RX ORDER — ACETAMINOPHEN 325 MG/1
975 TABLET ORAL 3 TIMES DAILY
Status: DISCONTINUED | OUTPATIENT
Start: 2021-02-01 | End: 2021-02-05 | Stop reason: HOSPADM

## 2021-02-01 RX ORDER — METHOCARBAMOL 500 MG/1
500 TABLET, FILM COATED ORAL 4 TIMES DAILY
Status: ON HOLD | DISCHARGE
Start: 2021-02-01 | End: 2021-02-03

## 2021-02-01 RX ORDER — WARFARIN SODIUM 2 MG/1
2 TABLET ORAL
Status: COMPLETED | OUTPATIENT
Start: 2021-02-01 | End: 2021-02-01

## 2021-02-01 RX ORDER — AMOXICILLIN 250 MG
1 CAPSULE ORAL DAILY
DISCHARGE
Start: 2021-02-01

## 2021-02-01 RX ORDER — POTASSIUM CHLORIDE 1500 MG/1
20 TABLET, EXTENDED RELEASE ORAL 2 TIMES DAILY
Status: ON HOLD | DISCHARGE
Start: 2021-02-01 | End: 2021-02-03

## 2021-02-01 RX ORDER — METHOCARBAMOL 500 MG/1
500 TABLET, FILM COATED ORAL 4 TIMES DAILY
Status: DISCONTINUED | OUTPATIENT
Start: 2021-02-01 | End: 2021-02-02

## 2021-02-01 RX ORDER — WARFARIN SODIUM 1 MG/1
2 TABLET ORAL
Status: DISCONTINUED | OUTPATIENT
Start: 2021-02-01 | End: 2021-02-01 | Stop reason: HOSPADM

## 2021-02-01 RX ORDER — AMOXICILLIN 250 MG
1-4 CAPSULE ORAL
Status: CANCELLED | OUTPATIENT
Start: 2021-02-01

## 2021-02-01 RX ORDER — FUROSEMIDE 20 MG
40 TABLET ORAL
Status: DISCONTINUED | OUTPATIENT
Start: 2021-02-01 | End: 2021-02-03

## 2021-02-01 RX ORDER — AMIODARONE HYDROCHLORIDE 200 MG/1
TABLET ORAL
Status: ON HOLD | DISCHARGE
Start: 2021-02-01 | End: 2021-02-03

## 2021-02-01 RX ORDER — POLYETHYLENE GLYCOL 3350 17 G/17G
17 POWDER, FOR SOLUTION ORAL DAILY PRN
Qty: 510 G | DISCHARGE
Start: 2021-02-01

## 2021-02-01 RX ORDER — MULTIPLE VITAMINS W/ MINERALS TAB 9MG-400MCG
1 TAB ORAL DAILY
Status: DISCONTINUED | OUTPATIENT
Start: 2021-02-02 | End: 2021-02-05 | Stop reason: HOSPADM

## 2021-02-01 RX ORDER — ATORVASTATIN CALCIUM 40 MG/1
40 TABLET, FILM COATED ORAL EVERY MORNING
Status: DISCONTINUED | OUTPATIENT
Start: 2021-02-02 | End: 2021-02-05 | Stop reason: HOSPADM

## 2021-02-01 RX ORDER — NITROGLYCERIN 0.4 MG/1
0.4 TABLET SUBLINGUAL EVERY 5 MIN PRN
Status: DISCONTINUED | OUTPATIENT
Start: 2021-02-01 | End: 2021-02-05 | Stop reason: HOSPADM

## 2021-02-01 RX ORDER — LISINOPRIL 5 MG/1
5 TABLET ORAL DAILY
Status: ON HOLD | DISCHARGE
Start: 2021-02-02 | End: 2021-02-03

## 2021-02-01 RX ORDER — AMOXICILLIN 250 MG
1 CAPSULE ORAL DAILY
Status: DISCONTINUED | OUTPATIENT
Start: 2021-02-01 | End: 2021-02-02

## 2021-02-01 RX ORDER — POTASSIUM CHLORIDE 750 MG/1
20 TABLET, EXTENDED RELEASE ORAL 2 TIMES DAILY
Status: DISCONTINUED | OUTPATIENT
Start: 2021-02-01 | End: 2021-02-03

## 2021-02-01 RX ORDER — ASPIRIN 81 MG/1
81 TABLET ORAL EVERY MORNING
Status: DISCONTINUED | OUTPATIENT
Start: 2021-02-02 | End: 2021-02-05 | Stop reason: HOSPADM

## 2021-02-01 RX ORDER — DEXTROSE MONOHYDRATE 25 G/50ML
25-50 INJECTION, SOLUTION INTRAVENOUS
Status: DISCONTINUED | OUTPATIENT
Start: 2021-02-01 | End: 2021-02-05 | Stop reason: HOSPADM

## 2021-02-01 RX ORDER — FUROSEMIDE 40 MG
40 TABLET ORAL
Status: ON HOLD | DISCHARGE
Start: 2021-02-01 | End: 2021-02-03

## 2021-02-01 RX ORDER — FOLIC ACID 1 MG/1
1 TABLET ORAL DAILY
Status: DISCONTINUED | OUTPATIENT
Start: 2021-02-02 | End: 2021-02-05 | Stop reason: HOSPADM

## 2021-02-01 RX ORDER — PANTOPRAZOLE SODIUM 40 MG/1
40 TABLET, DELAYED RELEASE ORAL
Status: ON HOLD | DISCHARGE
Start: 2021-02-02 | End: 2021-02-03

## 2021-02-01 RX ADMIN — PANTOPRAZOLE SODIUM 40 MG: 40 TABLET, DELAYED RELEASE ORAL at 07:48

## 2021-02-01 RX ADMIN — ASPIRIN 81 MG: 81 TABLET ORAL at 07:46

## 2021-02-01 RX ADMIN — AMIODARONE HYDROCHLORIDE 400 MG: 200 TABLET ORAL at 07:46

## 2021-02-01 RX ADMIN — ACETAMINOPHEN 975 MG: 325 TABLET, FILM COATED ORAL at 07:45

## 2021-02-01 RX ADMIN — METHOCARBAMOL 500 MG: 500 TABLET ORAL at 21:00

## 2021-02-01 RX ADMIN — POTASSIUM CHLORIDE 20 MEQ: 750 TABLET, EXTENDED RELEASE ORAL at 07:45

## 2021-02-01 RX ADMIN — FUROSEMIDE 40 MG: 20 TABLET ORAL at 16:46

## 2021-02-01 RX ADMIN — HYDROMORPHONE HYDROCHLORIDE 4 MG: 2 TABLET ORAL at 00:10

## 2021-02-01 RX ADMIN — WARFARIN SODIUM 2 MG: 2 TABLET ORAL at 17:29

## 2021-02-01 RX ADMIN — HYDROMORPHONE HYDROCHLORIDE 4 MG: 2 TABLET ORAL at 16:50

## 2021-02-01 RX ADMIN — METHOCARBAMOL 500 MG: 500 TABLET, FILM COATED ORAL at 07:48

## 2021-02-01 RX ADMIN — ACETAMINOPHEN 975 MG: 325 TABLET, FILM COATED ORAL at 00:10

## 2021-02-01 RX ADMIN — HYDROMORPHONE HYDROCHLORIDE 4 MG: 2 TABLET ORAL at 03:04

## 2021-02-01 RX ADMIN — METHOCARBAMOL 500 MG: 500 TABLET, FILM COATED ORAL at 11:49

## 2021-02-01 RX ADMIN — HYDROMORPHONE HYDROCHLORIDE 4 MG: 2 TABLET ORAL at 06:28

## 2021-02-01 RX ADMIN — ATORVASTATIN CALCIUM 40 MG: 40 TABLET, FILM COATED ORAL at 07:48

## 2021-02-01 RX ADMIN — METFORMIN HYDROCHLORIDE 500 MG: 500 TABLET ORAL at 09:37

## 2021-02-01 RX ADMIN — LISINOPRIL 5 MG: 5 TABLET ORAL at 07:46

## 2021-02-01 RX ADMIN — METHOCARBAMOL 500 MG: 500 TABLET ORAL at 16:46

## 2021-02-01 RX ADMIN — HYDROMORPHONE HYDROCHLORIDE 4 MG: 2 TABLET ORAL at 21:00

## 2021-02-01 RX ADMIN — FOLIC ACID 1 MG: 1 TABLET ORAL at 07:47

## 2021-02-01 RX ADMIN — MULTIPLE VITAMINS W/ MINERALS TAB 1 TABLET: TAB at 07:47

## 2021-02-01 RX ADMIN — INSULIN ASPART 3 UNITS: 100 INJECTION, SOLUTION INTRAVENOUS; SUBCUTANEOUS at 11:54

## 2021-02-01 RX ADMIN — POTASSIUM CHLORIDE 20 MEQ: 750 TABLET, EXTENDED RELEASE ORAL at 21:00

## 2021-02-01 RX ADMIN — DOCUSATE SODIUM AND SENNOSIDES 1 TABLET: 8.6; 5 TABLET ORAL at 16:46

## 2021-02-01 RX ADMIN — THIAMINE HCL TAB 100 MG 100 MG: 100 TAB at 07:47

## 2021-02-01 RX ADMIN — HYDROMORPHONE HYDROCHLORIDE 4 MG: 2 TABLET ORAL at 09:36

## 2021-02-01 RX ADMIN — FUROSEMIDE 40 MG: 40 TABLET ORAL at 07:48

## 2021-02-01 RX ADMIN — ACETAMINOPHEN 975 MG: 325 TABLET, FILM COATED ORAL at 21:00

## 2021-02-01 RX ADMIN — HYDROMORPHONE HYDROCHLORIDE 4 MG: 2 TABLET ORAL at 11:49

## 2021-02-01 RX ADMIN — LIDOCAINE 2 PATCH: 560 PATCH PERCUTANEOUS; TOPICAL; TRANSDERMAL at 07:41

## 2021-02-01 ASSESSMENT — ACTIVITIES OF DAILY LIVING (ADL)
ADLS_ACUITY_SCORE: 18

## 2021-02-01 ASSESSMENT — MIFFLIN-ST. JEOR: SCORE: 1756.22

## 2021-02-01 NOTE — PLAN OF CARE
Pt arrived to the unit in a wheel chair at about 1400 and admitted to room 503. He needed assist of one person using a walker to get in bed from wheel chair. He was oriented to the room, call light system, bed control, meal times and routines of the unit. He needs admission profile, PTA medication assessment and skin check over the next 24 hrs.

## 2021-02-01 NOTE — CONSULTS
02/01/21 [id number] Julisa Rodriguez, DONNA     Patient and daughter seen at bedside for warfarin teaching. Pt states he drinks 6-7 drinks per night and smokes and that he will try to cut back on both. States he understands all information presented. Literature given: Guide to Warfarin Therapy and Warfarin Therapy Calendar.

## 2021-02-01 NOTE — H&P
[facility]   Acute Rehabilitation Unit  Admission History and Physical    CHIEF COMPLAINT   Left-sided chest pain     HISTORY OF PRESENT ILLNESS  Jaxon Melendez is a 50 year old male with past medical history of hyperlipidemia, hypertension, DM 2, hidradenitis suppurativa in the presacral region, tobacco use disorder, alcohol use disorder, ischemic cardiomyopathy with ejection fraction of 10 to 20%, CAD s/p PCI to RCA and LCx in 2010. He started having left sided chest pain while working in the cold, patient had previous episodes of chest pain in the past, so he came to the hospital and was admitted to [facility] on 1/21/2021 for retrosternal chest pain exacerbated by exertion and relieved by rest.  Troponins were elevated with 25-69.  He was treated for coronary artery syndrome, was taken to Cath Lab 1/22/2021 and was found to have multiple vessel disease including in-stent restenosis and received an IABP 1/22/2021 and was deemed appropriate for CABG.  Patient went through CABG x3 with intraoperative cardioversion x1.  Postop course patient had respiratory instability and was transferred to ICU on third postop day he developed atrial flutter with RVR, required intubation and cardioversion x3 on 1/25/2021.  He was started on IV amiodarone and required hemodynamic support.  Patient was extubated on 1/26/2021 and had intermittent A. fib post extubation.  He was started on heparin drip and maintained sinus bradycardia in 50s and was transferred to postsurgical telemetry on 1/20/2021.  He was weaned off of oxygen, continued amiodarone, EP saw and recommended 400 mg p.o. amiodarone daily due to bradycardia is in the low 60s.  Patient difficulty with pain management postop.  And was not scheduled Tylenol, Robaxin and p.o. Dilaudid.  Patient was discharged to acute rehab floor on 2/1/2021.    During acute hospitalization, patient was seen and evaluated by PT and OT, who collectively  recommended that patient would benefit from ongoing therapies in the acute inpatient rehabilitation setting.        In review of the therapy notes  PT: Bed rolling, supine to sit, sit to stand, transfer, ambulation, stairs with supervision.  Patient able to walk to 350 feet with four-wheel walker and contact-guard assist.  OT: Feeding cleanup assistance, grooming supervision, bathing moderate assistance, upper body dressing supervision, lower body dressing dependent, toileting moderate assistance, toilet transfer moderate assist, showers and cognition not completed.  SLP: Swallow and communication not impaired    After arrival at the acute rehab unit patient said that he has the pain at the surgical incision site.  But while taking pain medication patient only able to activate her therapies on the good side.  He said that he had a lesion in the sacral region, cyst removal which never healed and caused supinators hidradenitis he denies any nausea, vomiting, headache, chest pain, shortness of breath, abdominal pain, difficulty urinating, swelling.    PAST MEDICAL HISTORY  Past Medical History:   Diagnosis Date     Acute systolic heart failure (H)     EF 10%     CAD (coronary artery disease)      Diabetes mellitus, type 2 (H)     uncontrolled     Essential hypertension      Hidradenitis suppurativa     presacral area     Hyperlipidemia LDL goal <100      NSTEMI (non-ST elevated myocardial infarction) (H)        SURGICAL HISTORY  Past Surgical History:   Procedure Laterality Date     ANGIOGRAM      2010, 2021     BYPASS GRAFT ARTERY CORONARY N/A 1/22/2021    Procedure: Median Sternotomy, Takedown of Left Internal Mammary Artery, Endovein Rutherford of Left Greater Saphenous Vein, Coronary Artery Bypass Grafting x3, On Cardiopulmonary Bypass, Transesophageal Echocardiogram per Anesthesia, Blackwell Catheter Insertion per Urology;  Surgeon: Jose Gibbons MD;  Location: [facility] OR     CV INTRA-AORTIC BALLOON PUMP INSERTION N/A  1/21/2021    Procedure: CV INTRA-AORTIC BALLOON PUMP INSERTION;  Surgeon: Dustin Nuñez MD;  Location:  HEART CARDIAC CATH LAB     CV SWAN RENETTA PROCEDURE N/A 1/21/2021    Procedure: Goessel Renetta Procedure;  Surgeon: Dustin Nuñez MD;  Location:  HEART CARDIAC CATH LAB       SOCIAL HISTORY  Marital Status:   Living situation: Lives alone in a single-family home at a single level with 2-3 stairs to enter.  Family support: Friends and family.  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  Tobacco use: Smokes 1 pack of cigarettes per day  Alcohol use: 5-6 drinks per day  Illicit drug use: Denies any illicit drug use  Social History     Socioeconomic History     Marital status:      Spouse name: Not on file     Number of children: Not on file     Years of education: Not on file     Highest education level: Not on file   Occupational History     Not on file   Social Needs     Financial resource strain: Not on file     Food insecurity     Worry: Not on file     Inability: Not on file     Transportation needs     Medical: Not on file     Non-medical: Not on file   Tobacco Use     Smoking status: Current Every Day Smoker     Packs/day: 1.00     Years: 25.00     Pack years: 25.00     Types: Cigarettes     Smokeless tobacco: Never Used     Tobacco comment: Now down to 1/2 PPD smoking   Substance and Sexual Activity     Alcohol use: Yes     Comment: 6-7 mixed drinks nightly     Drug use: Never     Sexual activity: Not on file   Lifestyle     Physical activity     Days per week: Not on file     Minutes per session: Not on file     Stress: Not on file   Relationships     Social connections     Talks on phone: Not on file     Gets together: Not on file     Attends Alevism service: Not on file      Active member of club or organization: Not on file     Attends meetings of clubs or organizations: Not on file     Relationship status: Not on file     Intimate partner violence     Fear of current or ex partner: Not on file     Emotionally abused: Not on file     Physically abused: Not on file     Forced sexual activity: Not on file   Other Topics Concern     Not on file   Social History Narrative     Not on file       FAMILY HISTORY  Family History   Problem Relation Age of Onset     Brain Tumor Mother      Heart Disease Other      Diabetes Other          PRIOR FUNCTIONAL HISTORY   Patient was independent with all ADLs/IADLs, transfers, mobility and gait.      MEDICATIONS  Scheduled meds  Medications Prior to Admission   Medication Sig Dispense Refill Last Dose     acetaminophen (TYLENOL) 325 MG tablet Take 3 tablets (975 mg) by mouth 3 times daily   2/1/2021 at 0745     amiodarone (PACERONE) 200 MG tablet Take 400 mg PO daily until Feb 8th, then take 200 mg PO daily (end date 3/29/21)   2/1/2021 at 0746     aspirin 81 MG EC tablet Take 81 mg by mouth every morning    2/1/2021 at 0746     atorvastatin (LIPITOR) 40 MG tablet Take 40 mg by mouth every morning    2/1/2021 at 0748     [START ON 2/2/2021] folic acid (FOLVITE) 1 MG tablet Take 1 tablet (1 mg) by mouth daily for 13 days   2/1/2021 at 0747     furosemide (LASIX) 40 MG tablet Take 1 tablet (40 mg) by mouth 2 times daily   2/1/2021 at 0748     HYDROmorphone (DILAUDID) 2 MG tablet Take 1-2 tablets (2-4 mg) by mouth every 4 hours as needed for moderate to severe pain  0 2/1/2021 at 1149     insulin aspart (NOVOLOG PEN) 100 UNIT/ML pen Inject 1-12 Units Subcutaneous 4 times daily (with meals and nightly) -164 =1 units.   -189 =2.   -214 =3.   -239 =4.   -264 =5.   -289 =6.   -314 =7.   -339 =8.   -364 =9.  BG greater than or equal to 365 give 10 units  To be given with meal insulin, based on pre-meal BG    2/1/2021 at 1154     [START ON 2/2/2021] insulin glargine (LANTUS PEN) 100 UNIT/ML pen Inject 12 Units Subcutaneous every morning (before breakfast)   2/1/2021 at 0752     [START ON 2/2/2021] lisinopril (ZESTRIL) 5 MG tablet Take 1 tablet (5 mg) by mouth daily   2/1/2021 at 0746     metFORMIN (GLUCOPHAGE) 500 MG tablet Take 500 mg by mouth daily (with breakfast)   2/1/2021 at 0937     methocarbamol (ROBAXIN) 500 MG tablet Take 1 tablet (500 mg) by mouth 4 times daily   2/1/2021 at 1149     [START ON 2/2/2021] pantoprazole (PROTONIX) 40 MG EC tablet Take 1 tablet (40 mg) by mouth every morning (before breakfast) for 14 days   2/1/2021 at 0748     potassium chloride ER (KLOR-CON M) 20 MEQ CR tablet Take 1 tablet (20 mEq) by mouth 2 times daily while on Lasix   2/1/2021 at 0745     [START ON 2/2/2021] thiamine (B-1) 100 MG tablet Take 1 tablet (100 mg) by mouth daily for 14 days   2/1/2021 at 0747     warfarin ANTICOAGULANT (COUMADIN) 2 MG tablet Take 2 mg PO daily at 6 pm until next INR check, then dose per INR goal 2-3 for atrial fibrillation   1/31/2021 at 1750     albuterol (PROAIR HFA/PROVENTIL HFA/VENTOLIN HFA) 108 (90 Base) MCG/ACT inhaler Inhale 2 puffs into the lungs every 6 hours as needed for wheezing or shortness of breath / dyspnea   Unknown at Unknown time     BETA BLOCKER NOT PRESCRIBED (INTENTIONAL) S/P CAB with EF 30-35%. Can restart when tolerated.   Unknown at Unknown time     melatonin 1 MG TABS tablet Take 1 tablet (1 mg) by mouth nightly as needed for sleep   1/28/2021 at 2159     [START ON 2/2/2021] multivitamin w/minerals (THERA-VIT-M) tablet Take 1 tablet by mouth daily   Unknown at Unknown time     nitroGLYcerin (NITROSTAT) 0.4 MG sublingual tablet Place 0.4 mg under the tongue every 5 minutes as needed for chest pain For chest pain place 1 tablet under the tongue every 5 minutes for 3 doses. If symptoms persist 5 minutes after 1st dose call 911.   Unknown at Unknown time     polyethylene  "glycol (MIRALAX) 17 GM/Dose powder Take 17 g by mouth daily as needed for constipation 510 g  Unknown at Unknown time     senna-docusate (SENOKOT-S/PERICOLACE) 8.6-50 MG tablet Take 1 tablet by mouth daily hold for loose stools   Unknown at Unknown time       ALLERGIES   No Known Allergies      REVIEW OF SYSTEMS  A 10 point ROS was performed and negative unless otherwise noted in HPI.     PHYSICAL EXAM  VITAL SIGNS:  /55 (BP Location: Right arm)   Pulse 61   Temp 97.4  F (36.3  C) (Oral)   Resp 20   Ht 1.778 m (5' 10\")   Wt 88.5 kg (195 lb 1.6 oz)   SpO2 92%   BMI 27.99 kg/m    BMI:  Estimated body mass index is 27.99 kg/m  as calculated from the following:    Height as of this encounter: 1.778 m (5' 10\").    Weight as of this encounter: 88.5 kg (195 lb 1.6 oz).     General: Alert, oriented, lying comfortably in bed.  HEENT: MMM, AT/NC, anicteric sclera  Pulmonary: equal bilateral air entry, no crackles or wheezes  Cardiovascular: audible S1/S2, no murmurs, incision in the middle of the chest healing well, bandage is intact in the lower part clean and dry  Abdomen: soft non-tender on deep and superficial palpation  Extremities: warm, well perfused, no edema in bilateral lower extremities, no tenderness in calves   MSK/neuro:   Mental Status:  alert and oriented x3    Cranial Nerves: grossly normal   1. 2nd CN: Pupils equal, round   2. 3rd,4th,6th CN:  EOMI, appropriate pupillary responses  3. 5th CN: facial sensation intact   4. 7th CN: face symmetrical   5. 8th CN: functional hearing bilaterally  6. 9th, 10th CN: palate elevates symmetrically   7. 11th CN: sternocleidomastoids and trapezii strong   8. 12th CN: tongue midline and with mild fasciculations   Sensory: Normal to light touch in bilateral upper and lower extremities - had some numbness after surgery but resolved    Strength: 5/5 in all muscle groups of the upper and lower extremities, except for L finger ext 2/5   Reflexes: Present and " symmetrical  right and bilateral knee, ankle, biceps   Figueroa's test: negative bilaterally   Babinski reflex: downgoing bilaterally    Abnormal movements: None    Speech: fluent   Cognition: intact   Skin: Visible skin without any skin changes      LABS  Pertinent labs   Lab Results   Component Value Date    WBC 11.6 02/01/2021     Lab Results   Component Value Date    RBC 3.25 02/01/2021     Lab Results   Component Value Date    HGB 10.2 02/01/2021     Lab Results   Component Value Date    HCT 31.4 02/01/2021     Lab Results   Component Value Date    MCV 97 02/01/2021     Lab Results   Component Value Date    MCH 31.4 02/01/2021     Lab Results   Component Value Date    MCHC 32.5 02/01/2021     Lab Results   Component Value Date    RDW 12.6 02/01/2021     Lab Results   Component Value Date     02/01/2021     Last Comprehensive Metabolic Panel:  Sodium   Date Value Ref Range Status   02/01/2021 136 133 - 144 mmol/L Final     Potassium   Date Value Ref Range Status   02/01/2021 4.3 3.4 - 5.3 mmol/L Final     Chloride   Date Value Ref Range Status   02/01/2021 101 94 - 109 mmol/L Final     Carbon Dioxide   Date Value Ref Range Status   02/01/2021 30 20 - 32 mmol/L Final     Anion Gap   Date Value Ref Range Status   02/01/2021 5 3 - 14 mmol/L Final     Glucose   Date Value Ref Range Status   02/01/2021 119 (H) 70 - 99 mg/dL Final     Urea Nitrogen   Date Value Ref Range Status   02/01/2021 11 7 - 30 mg/dL Final     Creatinine   Date Value Ref Range Status   02/01/2021 0.62 (L) 0.66 - 1.25 mg/dL Final     GFR Estimate   Date Value Ref Range Status   02/01/2021 >90 >60 mL/min/[1.73_m2] Final     Comment:     Non  GFR Calc  Starting 12/18/2018, serum creatinine based estimated GFR (eGFR) will be   calculated using the Chronic Kidney Disease Epidemiology Collaboration   (CKD-EPI) equation.       Calcium   Date Value Ref Range Status   02/01/2021 8.9 8.5 - 10.1 mg/dL Final        IMPRESSION/PLAN:  Jaxon Melendez is a 50 year old male with past medical history of hyperlipidemia, hypertension, DM 2, hidradenitis suppurativa in the presacral region, tobacco use disorder, alcohol use disorder, ischemic cardiomyopathy with ejection fraction of 10 to 20%, CAD s/p PCI to RCA and LCx in 2010, was admitted to [facility] on 1/21/2021 for retrosternal chest pain and underwent CABG x3 for multivessel disease(left internal mammary artery, left anterior descending artery, saphenous vein graft to first diagonal artery, saphenous vein graft to obtuse marginal artery) on 1/22/2021.    Admission to acute inpatient rehab: February 1, 2021  Impairment group code: 09 Cardiac: CABGx3      -PT for 90 minutes daily to work on neuromuscular re-education focusing on strength, balance, coordination, and endurance.    -OT for 90 minutes daily to work on ADL re-training such as grooming, self cares and bathing.   -Rehab RN for med administration, bowel regimen, glucose monitoring and wound care.        Medical Conditions  #S/p CABG x3  #Coronary artery disease  #Ischemic cardiomyopathy with low ejection fraction of 30-35%  Patient has rapid wide-complex tachycardia with cardioversion x3 on 1/25/2021 and amiodarone bolus was given.  EP was consulted and recommended amiodarone for 8 weeks.  Amiodarone load given on 1/27/2021 and transition 400 mg daily due to heart rate is in 50s on 1/29/2021.  Warfarin bridged with heparin infusion.  Echocardiogram done on 1/29/2021 showed ejection fraction of 30 to 25%, no LifeVest per EP.  Will follow with outpatient EP with echo in 3 months.  -Daily weights  -Sternal precautions, not lifting weight more than 10 pounds for 6 weeks.  -Continue amiodarone 400 mg daily and then 200 mg daily(starting 2/8/2021)  -Aspirin 81 mg daily  -Continue warfarin daily dosed by pharmacy.  -Hold beta-blockers as patient is bradycardic.  -Furosemide 40 mg twice daily.  -NTG 0.4 mg ever 5 min prn for  chest pain.    #Postoperative pain management  Patient was continued on scheduled Tylenol and Robaxin with Dilaudid 4 mg every 3 hours.  As patient has history of alcohol abuse he may require higher amount of medications for pain management.  -Continue scheduled Tylenol, Robaxin  -Continue as needed dilaudid 2-4 mg q4 hours  -Lidocaine patches q shift.    #Left hand weakness  Noted that he had temporary paresthesia in fingers b/l after surgery which resolved. Has noted some weakness in his left hand since last night 1/31 with no paresthesia or numbness. Exam showed weakness with finger ext at 2/5, WE was 4-/5, and other radially innervated muscles were also relatively weak but as significant as EDC.  Will monitor and discuss with the therapy team regarding ROM and strengthening exercises   Will need NCS/EMG as outpatient if persistent     #Acute postop respiratory failure(resolving)  #Aspiration pneumonia?  Patient was reintubated postop.  On 1/21/2021 and extubated on 1/26/2021 and improved with diuresis.  Patient infective work-up was negative for the UA, Gram stain negative for NG tube cultures.  Was started on cefepime and vancomycin (1/25-1/28/2021).  -Albuterol nebs as needed.  -We will continue to monitor.    #Diabetes mellitus  -Continue Lantus 12 units on 1/29/2021(PTA dose of 25 units)  -Metformin 500 mg daily with breakfast.  -Continue high dose sliding scale    #Hypertension  -Continue lisinopril 5 mg daily(started on 1/30/2021)    #Hyperlipidemia  -Continue atorvastatin 40 mg daily    #Alcohol use disorder  #Tobacco use disorder  -Education for alcohol and tobacco cessation  -Continue thiamine/folate.  -Continue gabapentin as above.    #Hypospadias  -Blackwell discontinued on 1/27/2021, voiding independently.    #Hidradenitis suppurativa in the presacral region  -Wound nurse consult done on 2/1/2021, appreciate recs  -As per wound nurse consult note.  Sacral and buttock wounds: Every 3 days     Cleanse the  area with Microklenz and pat dry.    Apply No sting film barrier to periwound skin.    Cover wound with Mepilex. Sacral Mepilex (#[id number])     Change dressing Q 3 days or more frequently for drainage.    If Mepilex requiring changing more than once a shift ok to switch to cleanse BID and apply gauze dressings where able.      1. Adjustment to disability:  Clinical psychology to eval and treat as indicated  2. FEN: regular diet, thin liquids  3. Bowel: continent  4. Bladder: continent  5. DVT Prophylaxis: warfarin and aspirin  6. GI Prophylaxis: pantoprazole 40 mg daily  7. Code: Full   8. Disposition: Home   9. ELOS: 5-7 days  10. Rehab prognosis:  Good  11. Follow up Appointments on Discharge: EP(cardiology) in 3 months with ECHO, PCP in 1 week.    Patient was seen and discussed with my staff physician Dr. Marion, who agrees with my assessment and plan.    Rogelio Wong   PGY 2  Physical Medicine and Rehabilitation  Pager: [phone]      Physician Attestation   I, Eleanor Marion MD, saw this patient with the resident and agree with the resident/fellow's findings and plan of care as documented in the note.      I personally reviewed vital signs, medications, labs, imaging and other providers' notes.    [place] findings:   Jaxon Melendez is a 51 yo male who was admitted on 1/21/21 with ACS and underwent CABG x3. He has a complicated PMH and extensive cardiac issues as outlined above. Post-op course was complicated by anemia, pain (requiring high dose narcotics), A flutter s/p cardioversion and respiratory failure s/p intubation. No clear etiology for L hand weakness; will monitor and discuss with the team. Might need NCS/ EMG as outpatient.     Hospitalist team was consulted and will follow. He needs close monitoring of his BP, BG, wounds, and volume status. Needs education on smoking cessation, alcohol dependence, wound care, DM management, MAP and sternal precautions.     He was I prior to admission and will improve  to mod I level with mobility and ADLs; limited by premorbid deconditioning, anemia, fatigue, pain, sternal precautions, and limited cardiac capacity.       Will benefit from intensive rehabilitation including 90 minutes each of PT and OT, rehabilitation nursing, and close management by physiatry.    Good rehab potentials, and good medical prognosis but at risk of more complications and re-admission. ELOS is 5-7 days.     Eleanor Marion MD  Date of Service (when I saw the patient): 02/01/21

## 2021-02-01 NOTE — DISCHARGE INSTRUCTIONS
AFTER YOU GO HOME FROM YOUR HEART SURGERY  (3 vessel coronary artery bypass 1/22/21 with Dr Jose Gibbons)    You had a sternotomy, avoid lifting anything greater than ten pounds for 6 weeks after surgery and then less than 20 pounds for an additional 6 weeks. Do not reach backwards or use arms to push out of chair. Do not let people pull on your arms to assist with standing. Avoid twisting or reaching too far across your body.  Avoid strenuous activities such as bowling, vacuuming, raking, shoveling, golf or tennis for 12 weeks after your surgery. It is okay to resume sex if you feel comfortable in doing so. You may have to try different positions with your partner.  Splint your chest incision by hugging a pillow or bringing your arms across your chest when coughing or sneezing.     No driving for 4 weeks after surgery or while on pain medication.     Shower or wash your incisions daily with soap and water (or as instructed), pat dry. Keep wound clean and dry, showers are okay after discharge, but don't let spray hit directly on incision. No baths or swimming for 1 month. Cover chest tube sites with gauze until they stop draining, then leave open to air. It is not abnormal for chest tube sites to drain yellowish/clear fluid for up to 2-3 weeks after surgery.   Watch for signs of infection: increased redness, tenderness, warmth or any drainage that appears infected (pus like) or is persistent.  Also a temperature > 100.5 F or chills. Call your surgeon or primary care provider's office immediately. Remove any skin glue left on incisions after 10-14 days. This will not affect your incision and can speed up healing.    Exercise is very important in your recovery. Please follow the guidelines set up for you in your cardiac rehab classes at the hospital. If outpatient cardiac rehab was ordered for you, we highly recommend you participate. If you have problems arranging your cardiac rehab, please call [phone] for  all locations, with the exception of [place], please call [phone] and [facility], please call [phone].    Avoid sitting for prolonged periods of time, try to walk every hour during the day. If you have a leg incision, elevate your leg often when you are not walking.    Check your weight when you get home from the hospital and continue to check it daily through your recovery for at least a month. If you notice a weight gain of 2-3 pounds in a week, notify your primary care physician, cardiologist or surgeon.    Bowel activity may be slow after surgery. If necessary, you may take an over the counter laxative such as Milk of Magnesia or Miralax. You may have stool softeners prescribed (docusate sodium, Senokot). We recommend using stool softeners while using narcotics for pain (oxycodone/percocet, hydrocodone/vicodin, hydromorphone/dilaudid).      Wean OFF of narcotics (oxycodone, dilaudid, hydrocodone) as soon as possible. You should continue taking acetaminophen as long as you have any surgical pain as the first choice for pain control and add narcotics as necessary for pain to be tolerable.      DO NOT SMOKE.  IF YOU NEED HELP QUITTING, PLEASE TALK WITH YOUR CARDIOLOGIST OR PRIMARY DOCTOR.    You are on a blood thinner for Atrial fibrillation, follow the instructions you were given in the hospital and DO NOT SKIP this medication. Try and take it the same time everyday. Your primary care physician or coumadin clinic will manage the dosing. INR goal is 2-3.    REGARDING PRESCRIPTION REFILLS.  If you need a refill on your pain medication contact us to discuss your pain and a possible one time refill.   All other medications will be adjusted, discontinued and re-filled by your primary care physician and/or your cardiologist as they were prior to your surgery. We have given you enough for one to three month with possibly one refill.    POST-OPERATIVE CLINIC VISITS  You have a follow up TELEPHONE visit with [facility] Practitioners on 2/16/21 at 2 pm.  You will then return to the care of your primary provider and your cardiologist. Future medication refills should come from your PCP or Cardiologist.   You should see your primary care provider in 1-2 weeks after discharge from Rehab Center.   It is important to see your cardiologist (Dr Deutsch) about 4-6 weeks after discharge.    If you do not hear from a  in 7 days, please call [phone] (choose option 1) and request to be seen with a general cardiologist or someone that you have seen in the past.   If there is a need to return to see CT Surgery please call our  at [phone].    SURGICAL QUESTIONS  Please call Sofia Hernandez or Patricia Tanner with surgical recovery and medication questions, their phone numbers are listed below.  They will assist you with your needs and contact other surgery care team members as indicated.    On weekends or after hours, please call [phone] and ask the  to   page the Cardiothoracic Surgery fellow on call.      Thank you,    Your Cardiothoracic Surgery Team  Sofia Hernandez RN Care Coordinator-  [phone]   Patricia Tanner RN Care Coordinator-  [phone]

## 2021-02-01 NOTE — PLAN OF CARE
Physical Therapy Discharge Summary    Reason for therapy discharge:    Discharged to acute rehabilitation facility.    Progress towards therapy goal(s). See goals on Care Plan in Psychiatric electronic health record for goal details.  Goals not met.  Barriers to achieving goals:   discharge from facility.    Therapy recommendation(s):    Continued therapy is recommended.  Rationale/Recommendations:  ARU to maximize functional IND, safety.

## 2021-02-01 NOTE — PHARMACY-MEDICATION REGIMEN REVIEW
Pharmacy Medication Regimen Review  Jaxon Melendez is a 50 year old male who is currently in the Acute Rehab Unit.    Assessment: All medications have an appropriate indications, durations and no unnecessary use was found    Plan:   1. Patient on warfarin and aspirin - monitor for s/s of bleeding. Pharmacy will monitor INR and adjust warfarin dose.  2. Patient on insulin - monitor for hypoglycemia. Hypoglycemia protocol is ordered.  Attending provider will be sent this note for review.  If there are any emergent issues noted above, pharmacist will contact provider directly by phone.      Pharmacy will periodically review the resident's medication regimen for any PRN medications not administered in > 72 hours and discontinue them. The pharmacist will discuss gradual dose reductions of psychopharmacologic medications with interdisciplinary team on a regular basis.    Please contact pharmacy if the above does not answer specific medication questions/concerns.    Background:  A pharmacist has reviewed all medications and pertinent medical history today.  Medications were reviewed for appropriate use and any irregularities found are listed with recommendations.      Current Facility-Administered Medications:      acetaminophen (TYLENOL) tablet 975 mg, 975 mg, Oral, TID, Vane Pradhan PA     albuterol (PROAIR HFA/PROVENTIL HFA/VENTOLIN HFA) 108 (90 Base) MCG/ACT inhaler 2 puff, 2 puff, Inhalation, Q6H PRN, Vane Pradhan PA     [START ON 2/8/2021] amiodarone (PACERONE) tablet 200 mg, 200 mg, Oral, Daily, Vane Pradhan PA     [START ON 2/2/2021] amiodarone (PACERONE) tablet 400 mg, 400 mg, Oral, Daily, Vane Pradhan PA     [START ON 2/2/2021] aspirin EC tablet 81 mg, 81 mg, Oral, Lorne GIVENS Jennifer A, PA     [START ON 2/2/2021] atorvastatin (LIPITOR) tablet 40 mg, 40 mg, Oral, Lorne GIVENS Jennifer A, PA     glucose gel 15-30 g, 15-30 g, Oral, Q15 Min PRN **OR** dextrose 50 % injection  25-50 mL, 25-50 mL, Intravenous, Q15 Min PRN **OR** glucagon injection 1 mg, 1 mg, Subcutaneous, Q15 Min PRN, Vane Pradhan PA     [START ON 2/2/2021] folic acid (FOLVITE) tablet 1 mg, 1 mg, Oral, Daily, Vane Pradhan PA     furosemide (LASIX) tablet 40 mg, 40 mg, Oral, BID, Vane Pradhan PA     HYDROmorphone (DILAUDID) tablet 2-4 mg, 2-4 mg, Oral, Q4H PRN, Vane Pradhan PA     insulin aspart (NovoLOG) injection (RAPID ACTING), 1-10 Units, Subcutaneous, TID AC, Vane Pradhan PA     insulin aspart (NovoLOG) injection (RAPID ACTING), 1-7 Units, Subcutaneous, At Bedtime, Vane Pradhan PA     [START ON 2/2/2021] insulin glargine (LANTUS PEN) injection 12 Units, 12 Units, Subcutaneous, QAM AC, Vane Pradhan PA     [START ON 2/2/2021] Lidocaine (LIDOCARE) 4 % Patch 1-3 patch, 1-3 patch, Transdermal, Q24H, Vane Pradhan PA     lidocaine patch in PLACE, , Transdermal, Q8H, Vane Pradhan PA     [START ON 2/2/2021] lisinopril (ZESTRIL) tablet 5 mg, 5 mg, Oral, Daily, Vane Pradhan PA     [START ON 2/2/2021] metFORMIN (GLUCOPHAGE) tablet 500 mg, 500 mg, Oral, Daily with breakfast, Vane Pradhan PA     methocarbamol (ROBAXIN) tablet 500 mg, 500 mg, Oral, 4x Daily, Vane Pradhan PA     [START ON 2/2/2021] multivitamin w/minerals (THERA-VIT-M) tablet 1 tablet, 1 tablet, Oral, Daily, Vane Pradhan PA     naloxone (NARCAN) injection 0.2 mg, 0.2 mg, Intravenous, Q2 Min PRN **OR** naloxone (NARCAN) injection 0.4 mg, 0.4 mg, Intravenous, Q2 Min PRN **OR** naloxone (NARCAN) injection 0.2 mg, 0.2 mg, Intramuscular, Q2 Min PRN **OR** naloxone (NARCAN) injection 0.4 mg, 0.4 mg, Intramuscular, Q2 Min PRN, Eleanor Marion MD     nitroGLYcerin (NITROSTAT) sublingual tablet 0.4 mg, 0.4 mg, Sublingual, Q5 Min PRN, Vane Pradhan PA     [START ON 2/2/2021] pantoprazole (PROTONIX) EC tablet 40 mg, 40 mg, Oral, [facility], Vane Pradhan, PA      Patient is already receiving anticoagulation with heparin, enoxaparin (LOVENOX), warfarin (COUMADIN)  or other anticoagulant medication, , Does not apply, Continuous PRN, Rogelio Wong MD     polyethylene glycol (MIRALAX) powder 17 g, 17 g, Oral, Daily PRN, Vane Pradhan PA     potassium chloride ER (KLOR-CON M) CR tablet 20 mEq, 20 mEq, Oral, BID, Vane Pradhan PA     senna-docusate (SENOKOT-S/PERICOLACE) 8.6-50 MG per tablet 1 tablet, 1 tablet, Oral, Daily, Vane Pradhan PA     [START ON 2/2/2021] thiamine (B-1) tablet 100 mg, 100 mg, Oral, Daily, Vane Pradhan PA     warfarin ANTICOAGULANT (COUMADIN) tablet 2 mg, 2 mg, Oral, ONCE at 18:00, Eleanor Marion MD     Warfarin Therapy Reminder (Check START DATE - warfarin may be starting in the FUTURE), 1 each, Does not apply, Continuous PRN, Vane Pradhan PA  No current outpatient prescriptions on file.   CC:   CABG x 3     PMH:   CAD with stable angina, ICM with EF 30-35%.   Hx Stenting to RCA and LCx in 2010, in-stent re-stenosis   Atrial Fibrillation on Amiodarone   Anticoagulation for A-fib, INR goal 2-3 for at least 3 months   Hx heavy alcohol use   Acute hypoxic respiratory failure requiring second intubation, resolved     Debbie Gunderson, [place], BCPS February 1, 2021

## 2021-02-01 NOTE — DISCHARGE SUMMARY
[facility], [place]   Cardiothoracic Surgery Hospital Discharge Summary     Jaxon Melendez MRN# [id number]   Age: 50 year old YOB: 1970     Admitting Physician:  Jose Gibbons MD  Discharge Physician:  KATERYNA Benton  Primary Care Physician:        Omaira Ref-Primary, Physician     DATE OF ADMISSION: 1/21/2021      DATE OF DISCHARGE: February 1, 2021     Admit Wt: 91.8 kg   Discharge Wt: 87.5 kg          Primary Diagnoses:   1. CAD with stable angina, ICM with EF 30-35%.   2. Hx Stenting to RCA and LCx in 2010, in-stent re-stenosis  3. S/P 3 vessel CAB   4. Atrial Fibrillation on Amiodarone   5. Anticoagulation for A-fib, INR goal 2-3 for at least 3 months  6. Hx heavy alcohol use   7. Acute hypoxic respiratory failure requiring second intubation, resolved          Secondary Diagnoses:   1. HTN  2. HLD   3. DMT2 newly insulin dependent   4. Smoking history   5. Hidradenitis suppurativa in the presacral area   6. Hx Pilonidal cysts     PROCEDURES PERFORMED:   Date: 1/22/21.  Surgeon: Dr. Jose Gibbons   1.  Coronary artery bypass grafting x3 (left internal mammary artery, left anterior descending artery, saphenous vein graft to first diagonal artery, saphenous vein graft to obtuse marginal artery).   2.  Endoscopic vein harvest.     OPERATIVE FINDINGS:    The patient's sternum was of adequate quality.  Pericardial space is free of any adhesions.  Vessels bypassed included the mid left anterior descending artery 2 mm in diameter with proximal stenosis, the first diagonal artery was 2 mm in diameter with proximal stenosis, and the obtuse marginal artery was 2.25 mm in diameter.     INTRAOPERATIVE COMPLICATIONS:  none    PATHOLOGY RESULTS:  none     CULTURE RESULTS:  none    CONSULTS:    1. PT/OT  2. Electrophysiology Cardiology     BRIEF HISTORY OF ILLNESS:  Mr. Melendez is a 50 year old male who has a past medical history significant for HLD, HTN, DM II, CAD s/p  PCI to RCA and LCx in 2010, hidradenitis suppurativa in the presacral area, ETOH abuse/use, and tobacco abuse.   He had a known prior cardiac history of CAD and has PCI to RCA and LCx in 2010 at a younger age. He most recently developed retrosternal chest pain excerbated by exertion, relieved with rest. Troponins were elevated 25-69. Coronary angiogram showed severe multi vessel disease including in-stent restenosis. His LVEF was approximately 10%. Due to potential need for extensive mechanical circulatory support he was referred to the [facility] and was deemed an appropriate candidate for CABG.     HOSPITAL COURSE: Jaxon Melendez is a 50 year old male who on 1/22/2021 underwent the above-named procedures.  He tolerated the operation well and postoperatively was admitted to the CVICU. He had pre-op IABP placement, intra-op cardioversion x1, and overall uneventful CABG x 3. IABP removed POD1.  He was extubated on POD # 1 with neuro status intact on CIWA protocol for EtOH use. He was transferred to the floor on POD#2 but went back to the ICU for respiratory instability. On POD#3 he developed A-flutter with RVR, BBB, and hemodynamic stability. He required reintubation and cardioversion x 3 on 1/25. He required hemodynamic support and IV amiodarone. He was extubated again 1/26 and had intermittent A-fib post-extubation. He was started on a heparin drip and maintained sinus landy in the 50's.  He was transferred to the post-surgical telemetry unit on 1/28/21.      Silvio stabilized on the tele-floor. His chest tubes were removed as tolerated and was weaned off supplemental O2. He continued on PO amiodarone, Electrophysiology saw him and recommended 400 mg PO daily due to HR avg low 60's. He was not started on BB due to HR in 60's and low EF. This can be restarted at a later date. Recommend ECHO in 3 months.   Pain control was an issue at times, he did better with scheduled APAP and Robaxin, as well as with PO  dilaudid. He was therefore able to walk more, which helped his pain control.   He was therapeutic on Coumadin for A-fib (at least 3 months). Consider Armond 3 weeks prior to Cardiology follow up.     Prior to discharge, his pain was controlled well, he was able to perform most ADLs and ambulate without difficulty, and had full return of bowel and bladder function.  On February 1, 2021, he was discharged to [facility] in stable condition.    Patient discharged on aspirin:  Yes 81 mg  Patient discharged on beta blocker: no (recent bradycardia)    Patient discharged on ACE Inhibitor/ARB: yes             Discharge Disposition:     Discharged to rehabilitation facility            Condition on Discharge:     Discharge condition: Stable   Discharge vitals: Blood pressure 114/58, pulse 61, temperature 98.3  F (36.8  C), temperature source Oral, resp. rate 16, weight 87.5 kg (193 lb), SpO2 93 %.     Code status on discharge: Full Code     Vitals:    01/30/21 0650 01/31/21 0600 02/01/21 0631   Weight: 88.9 kg (195 lb 14.4 oz) 88 kg (193 lb 14.4 oz) 87.5 kg (193 lb)       DAY OF DISCHARGE PHYSICAL EXAM:    MAPs: 68 - 80  Gen:  NAD, conversational  CV: RRR, S1S2 normal, no murmurs, rubs, or gallops  Pulm:  CTA, no rhonchi or wheezes  Abd:  Soft, nondistended, NTTP  Ext: trace dependent edema, non-pitting  Incision: clean, dry, intact, no erythema  Chest Tube sites: Dressings clean and dry    BMP  Recent Labs   Lab 02/01/21 0454 01/31/21  0517 01/30/21  0554 01/29/21  0548    135 135 135   POTASSIUM 4.3 3.9 3.9 4.2   CHLORIDE 101 100 100 100   RICHARD 8.9 8.6 8.8 8.7   CO2 30 31 32 28   BUN 11 14 24 28   CR 0.62* 0.62* 0.66 0.78   * 124* 145* 155*     CBC  Recent Labs   Lab 02/01/21 0454 01/31/21  0517 01/30/21  0554 01/29/21  0548   WBC 11.6* 9.9 11.3* 10.8   RBC 3.25* 3.09* 3.12* 3.03*   HGB 10.2* 9.4* 9.8* 9.6*   HCT 31.4* 29.7* 30.2* 28.9*   MCV 97 96 97 95   MCH 31.4 30.4 31.4 31.7   MCHC 32.5 31.6 32.5 33.2    RDW 12.6 12.6 12.4 12.3    274 254 214     INR  Recent Labs   Lab 02/01/21  0454 01/31/21  0517 01/30/21  0554 01/29/21  0548   INR 2.69* 2.67* 2.31* 1.69*      Liver Function Studies -   Recent Labs   Lab Test 01/23/21  0355   PROTTOTAL 5.9*   ALBUMIN 3.1*   BILITOTAL 0.9   ALKPHOS 62   AST 21   ALT 10     Recent Labs   Lab 02/01/21  0454 02/01/21  0303 01/31/21  2014 01/31/21  1214 01/31/21  0750 01/31/21  0517 01/31/21  0302 01/30/21  2211 01/30/21  0554 01/30/21  0554 01/29/21  0548 01/29/21  0548 01/28/21  1613 01/28/21  1613 01/28/21  0343 01/28/21  0343   *  --   --   --   --  124*  --   --   --  145*  --  155*  --  204*  --  141*   BGM  --  128* 188* 371* 144*  --  144* 149*   < >  --    < >  --    < >  --    < >  --     < > = values in this interval not displayed.       ECHOCARDIOGRAM, 1/29/2021-   Complete Portable Echo Adult. Contrast Optison. Technically difficult study. Extremely poor  acoustic windows. Patient was given 5 ml mixture of 3 ml Optison and 6 ml saline. 4 ml wasted.  ___________________________________________________________________________  Interpretation Summary-    Technically difficult study.Extremely poor acoustic windows.  The Ejection Fraction is estimated at 30-35%.  No pericardial effusion is present.  ____________________________________________________________________________     Left Ventricle-     Left ventricular size is normal. Left ventricular wall thickness cannot evaluate. The Ejection Fraction  is estimated at 30-35%. Left ventricular diastolic function is indeterminate. No regional wall motion  abnormalities are seen.     Right Ventricle-  Right ventricular function, chamber size, wall motion, and thickness are normal.      Atria-  The atria cannot be assessed.     Mitral Valve-  The mitral valve is normal.     Aortic Valve-  Aortic valve is normal in structure and function.     Tricuspid Valve-  The valve leaflets are not well visualized. Trace  tricuspid insufficiency is present.  The right ventricular systolic pressure is approximated at 18.1 mmHg plus the right atrial pressure.     Pulmonic Valve-  The pulmonic valve cannot be assessed.     Vessels-   The thoracic aorta cannot be assessed. The pulmonary artery cannot be assessed. The  inferior vena cava cannot be assessed.     Pericardium-   No pericardial effusion is present.  ___________________________________________________  MMode/2D Measurements & Calculations  IVSd: 1.3 cm  LVIDd: 5.6 cm  LVIDs: 4.7 cm  LVPWd: 1.6 cm  FS: 15.2 %  LV mass(C)d: 350.9 grams  LV mass(C)dI: 169.2 grams/m2     EF(MOD-bp): 35.1 %  RWT: 0.57      Doppler Measurements & Calculations  MV E max thuan: 97.5 cm/sec  MV A max thuan: 69.4 cm/sec  MV E/A: 1.4  MV dec slope: 520.0 cm/sec2  TR max thuan: 213.0 cm/sec  TR max P.1 mmHg  E/E' avg: 10.7  Lateral E/e': 8.9  Medial E/e': 12.5    CXR 2021-   PA and lateral views of the chest. Interval removal of the right IJ central venous catheter. Postsurgical  changes of CABG. Midline trachea. Stable cardiomegaly. Pulmonary vasculature is indistinct. Mild perihilar  prominence. Decreased size of the left pleural effusion. Decreased retrocardiac opacities. No pneumothorax.  Upper abdomen is unremarkable.  IMPRESSION:   1. Decreased size of the small left pleural effusion with associated retrocardiac atelectasis.  2. Decreased pulmonary edema.    DISCHARGE INSTRUCTIONS:  You had a sternotomy, avoid lifting anything greater than ten pounds for 6 weeks after surgery and then less than 20 pounds for an additional 6 weeks. Do not reach backwards or use arms to push out of chair. Do not let people pull on your arms to assist with standing. Avoid twisting or reaching too far across your body.  Avoid strenuous activities such as bowling, vacuuming, raking, shoveling, golf or tennis for 12 weeks after your surgery. It is okay to resume sex if you feel comfortable in doing so. You may have to  try different positions with your partner.  Splint your chest incision by hugging a pillow or bringing your arms across your chest when coughing or sneezing.     No driving for 4 weeks after surgery or while on pain medication.     Shower or wash your incisions daily with soap and water (or as instructed), pat dry. Keep wound clean and dry, showers are okay after discharge, but don't let spray hit directly on incision. No baths or swimming for 1 month. Cover chest tube sites with gauze until they stop draining, then leave open to air. It is not abnormal for chest tube sites to drain yellowish/clear fluid for up to 2-3 weeks after surgery.   Watch for signs of infection: increased redness, tenderness, warmth or any drainage that appears infected (pus like) or is persistent.  Also a temperature > 100.5 F or chills. Call your surgeon or primary care provider's office immediately. Remove any skin glue left on incisions after 10-14 days. This will not affect your incision and can speed up healing.    Exercise is very important in your recovery. Please follow the guidelines set up for you in your cardiac rehab classes at the hospital. If outpatient cardiac rehab was ordered for you, we highly recommend you participate. If you have problems arranging your cardiac rehab, please call [phone] for all locations, with the exception of [place], please call [phone] and [place], please call [phone].    Avoid sitting for prolonged periods of time, try to walk every hour during the day. If you have a leg incision, elevate your leg often when you are not walking.    Check your weight when you get home from the hospital and continue to check it daily through your recovery for at least a month. If you notice a weight gain of 2-3 pounds in a week, notify your primary care physician, cardiologist or surgeon.    Bowel activity may be slow after surgery. If necessary, you may take an over the counter laxative such as Milk  of Magnesia or Miralax. You may have stool softeners prescribed (docusate sodium, Senokot). We recommend using stool softeners while using narcotics for pain (oxycodone/percocet, hydrocodone/vicodin, hydromorphone/dilaudid).      Wean OFF of narcotics (oxycodone, dilaudid, hydrocodone) as soon as possible. You should continue taking acetaminophen as long as you have any surgical pain as the first choice for pain control and add narcotics as necessary for pain to be tolerable.      DO NOT SMOKE.  IF YOU NEED HELP QUITTING, PLEASE TALK WITH YOUR CARDIOLOGIST OR PRIMARY DOCTOR.    You are on a blood thinner for Atrial fibrillation, follow the instructions you were given in the hospital and DO NOT SKIP this medication. Try and take it the same time everyday. Your primary care physician or coumadin clinic will manage the dosing. INR goal is 2-3.    REGARDING PRESCRIPTION REFILLS.  If you need a refill on your pain medication contact us to discuss your pain and a possible one time refill.   All other medications will be adjusted, discontinued and re-filled by your primary care physician and/or your cardiologist as they were prior to your surgery. We have given you enough for one to three month with possibly one refill.    POST-OPERATIVE CLINIC VISITS  You have a follow up TELEPHONE visit with [facility] Surgery Advance Care Practitioners on 2/16/21 at 2 pm.  You will then return to the care of your primary provider and your cardiologist. Future medication refills should come from your PCP or Cardiologist.   You should see your primary care provider in 1-2 weeks after discharge from Rehab Center.   It is important to see your cardiologist (Dr Deutsch) about 4-6 weeks after discharge.      PRE-ADMISSION MEDICATIONS:  No current facility-administered medications on file prior to encounter.   No current outpatient medications on file prior to encounter.       DISCHARGE MEDICATIONS:    Silvio Melendez   Home Medication Instructions  NIKOLAS:[id number]    Printed on:02/01/21 0970   Medication Information                      acetaminophen (TYLENOL) 325 MG tablet  Take 3 tablets (975 mg) by mouth 3 times daily             albuterol (PROAIR HFA/PROVENTIL HFA/VENTOLIN HFA) 108 (90 Base) MCG/ACT inhaler  Inhale 2 puffs into the lungs every 6 hours as needed for wheezing or shortness of breath / dyspnea             amiodarone (PACERONE) 200 MG tablet  Take 400 mg PO daily until Feb 8th, then take 200 mg PO daily (end date 3/29/21)             aspirin 81 MG EC tablet  Take 81 mg by mouth every morning              atorvastatin (LIPITOR) 40 MG tablet  Take 40 mg by mouth every morning              BETA BLOCKER NOT PRESCRIBED (INTENTIONAL)  S/P CAB with EF 30-35%. Can restart when tolerated.             folic acid (FOLVITE) 1 MG tablet  Take 1 tablet (1 mg) by mouth daily for 13 days             furosemide (LASIX) 40 MG tablet  Take 1 tablet (40 mg) by mouth 2 times daily             HYDROmorphone (DILAUDID) 2 MG tablet  Take 1-2 tablets (2-4 mg) by mouth every 4 hours as needed for moderate to severe pain             insulin aspart (NOVOLOG PEN) 100 UNIT/ML pen  Inject 1-12 Units Subcutaneous 4 times daily (with meals and nightly) -164 =1 units.   -189 =2.   -214 =3.   -239 =4.   -264 =5.   -289 =6.   -314 =7.   -339 =8.   -364 =9.  BG greater than or equal to 365 give 10 units  To be given with meal insulin, based on pre-meal BG             insulin glargine (LANTUS PEN) 100 UNIT/ML pen  Inject 12 Units Subcutaneous every morning (before breakfast)             lisinopril (ZESTRIL) 5 MG tablet  Take 1 tablet (5 mg) by mouth daily             melatonin 1 MG TABS tablet  Take 1 tablet (1 mg) by mouth nightly as needed for sleep             metFORMIN (GLUCOPHAGE) 500 MG tablet  Take 500 mg by mouth daily (with breakfast)             methocarbamol (ROBAXIN) 500 MG tablet  Take 1 tablet (500 mg) by mouth 4  times daily             multivitamin w/minerals (THERA-VIT-M) tablet  Take 1 tablet by mouth daily             nitroGLYcerin (NITROSTAT) 0.4 MG sublingual tablet  Place 0.4 mg under the tongue every 5 minutes as needed for chest pain For chest pain place 1 tablet under the tongue every 5 minutes for 3 doses. If symptoms persist 5 minutes after 1st dose call 911.             pantoprazole (PROTONIX) 40 MG EC tablet  Take 1 tablet (40 mg) by mouth every morning (before breakfast) for 14 days             polyethylene glycol (MIRALAX) 17 GM/Dose powder  Take 17 g by mouth daily as needed for constipation             potassium chloride ER (KLOR-CON M) 20 MEQ CR tablet  Take 1 tablet (20 mEq) by mouth 2 times daily while on Lasix             senna-docusate (SENOKOT-S/PERICOLACE) 8.6-50 MG tablet  Take 1 tablet by mouth daily hold for loose stools             thiamine (B-1) 100 MG tablet  Take 1 tablet (100 mg) by mouth daily for 14 days             warfarin ANTICOAGULANT (COUMADIN) 2 MG tablet  Take 2 mg PO daily at 6 pm until next INR check, then dose per INR goal 2-3 for atrial fibrillation               CC:s  No Ref-Primary, Physician  Dr Oswaldo Deutsch      [facility]   Cardiothoracic Surgery  Office phone: [phone]  Office fax: [phone]

## 2021-02-01 NOTE — PLAN OF CARE
DISCHARGE   Discharged to: ARU at 13:45  Via: Automobile  Accompanied by: Family- daughter  Discharge Instructions: diet, activity, medications, follow up appointments, when to call the MD, and what to watchout for (i.e. s/s of infection, increasing SOB, palpitations, chest pain,)  Prescriptions: To be filled by ARU  pharmacy per pt's request; medication list reviewed & sent with pt  Follow Up Appointments: arranged; information given  Belongings: All sent with pt  IV: out  Telemetry: off  Pt exhibits understanding of above discharge instructions; all questions answered.  Discharge Paperwork: faxed     Hid rhythm was SB/SR 50-70's with a long QT with an occasional  couplet PVC, and 0-7 PVC/min, He is waiting to be picked up at 13:45. He left at 13:45

## 2021-02-01 NOTE — CONSULTS
[facility] Nurse Inpatient Wound Assessment   Reason for consultation: Evaluate and treat  Sacral and bottuck wounds    Assessment  Sacral and buttock wounds due to Hidradenitis Suppurativa and prior cysts   Status: initial assessment    Treatment Plan  Sacral and buttock wounds: Every 3 days     Cleanse the area with Microklenz and pat dry.    Apply No sting film barrier to periwound skin.    Cover wound with Mepilex. Sacral Mepilex (#[id number])     Change dressing Q 3 days or more frequently for drainage.    If Mepilex requiring changing more than once a shift ok to switch to cleanse BID and apply gauze dressings where able.          Orders Written  Recommended provider order: Recommend patient follow up outpatient with primary doctor or dermatologist to manage wounds as he has been dealing with recurring cysts for 15 years per patient.  [facility] Nurse follow-up plan:weekly  Nursing to notify the Provider(s) and re-consult the [facility] Nurse if wound(s) deteriorates or new skin concern.    Patient History  According to provider note(s):  Jaxon Melendez is a 50 year old male with PMH of HLD, HTN, DM II, tobacco use, heavy alcohol use, ICM with 10-20% EF, CAD s/p PCI to RCA and LCx in 2010, found to have triple vessel disease. Pre-op IABP, intra-op cardioversion x1, uneventful CABG x 3. IABP removed POD1. He was originally transferred to the floor on POD#2, on POD#3 he developed a-flutter with RVR, BBB, and hemodynamic stability. He was transferred back to the unit and required reintubation and cardioversion x 3 1/25. He required hemodynamic support and IV amiodarone. He was extubated 01/26  and has had intermittent afib post-extubation. He is currently on heparin drip and in sinus landy in the 50's.     Objective Data  Containment of urine/stool: Continent of bladder and Continent of bowel    Active Diet Order  Orders Placed This Encounter      Regular Diet Adult      Advance Diet as Tolerated      Output:   I/O last 3 completed  shifts:  In: 500 [P.O.:500]  Out: 1675 [Urine:1675]    Risk Assessment:   Sensory Perception: 4-->no impairment  Moisture: 4-->rarely moist  Activity: 3-->walks occasionally  Mobility: 3-->slightly limited  Nutrition: 3-->adequate  Friction and Shear: 3-->no apparent problem  Doni Score: 20                          Labs:   Recent Labs   Lab 02/01/21  0454   HGB 10.2*   INR 2.69*   WBC 11.6*       Physical Exam  Areas of skin assessed: focused sacrum/ buttock    Sacrum 2/1/21    Bilateral buttock 2/1/21    Wound History: Present on admission- Patient reports he had a cyst drained approximately 15 years ago and has had multiple spots open and drain since then. Writer discussed with patient that he should be sure to follow up with his primary doctor outpatient and update them about this ongoing issue.     Wound Base:  Difficult to assess due to how small openings are. Only able to visualize openings. Left upper sacral wound only actively draining wound at this time.     Palpation of the wound bed: normal, boggy and fluctuance      Drainage: small     Description of drainage: serosanguinous and cloudy     Measurements (length x width x depth, in cm)  Left upper sacrum 4 x 1 x utd cm area discolored epidermis with 2 openings within and one opening just inferior; right sacrum with 1.5 x 1.5 x utd cm region discoloer epidermis with 1 opening. Coccyx and gluteal cleft with mild MASD and maceration present. Right buttock 0.5 x 0.3 x 0.1/utd cm opening; left buttock 0.5 x 0.1 x 0.1/utd cm opening.      Tunneling and undermining dfficult to determine du to size of openings being so narrow/ small  Periwound skin: intact and macerated      Color: normal and consistent with surrounding tissue      Temperature: normal   Odor: none  Pain: mild, tender      Interventions  Visual inspection and assessment completed   Wound Care Rationale Protect periwound skin, Provide protection  and Pain reduction  Wound Care: done per plan of  care  Supplies: floor stock and discussed with RN  Current off-loading measures: Pillows  Current support surface: Standard  Atmos Air mattress  Education provided to: plan of care, wound progress and Moisture management  Discussed plan of care with Patient and Nurse    Madison Joshua RN, CWOCN

## 2021-02-01 NOTE — PROGRESS NOTES
Care Management Discharge Note    Discharge Date: 02/01/21  Expected Time of Departure: 1:45pm via w/c van to  ARU    Discharge Disposition: Acute Rehab   Acute Rehab  Admissions  [phone]  RN Station  [phone]    Discharge Services: None    Discharge DME: None    Discharge Transportation: [facility] transportation ( [phone])    Private pay costs discussed: Not applicable    PAS Confirmation Code:  N/A  Patient/family educated on Medicare website which has current facility and service quality ratings: (N/A)    Education Provided on the Discharge Plan:  yes  Persons Notified of Discharge Plans: Primary team [facility],  rehab liaison DONNA Rose, patient, pt states he will update his dtr Jonathan  Patient/Family in Agreement with the Plan: yes    Handoff Referral Completed: No- no PCP on file    Additional Information:  Pt discharging to  ARU today at 1:45pm.    RN please call report to [phone].    ADELITA Garcia, [facility]  6C   [facility]  Pager [phone]  Phone [phone]

## 2021-02-01 NOTE — PHARMACY-ANTICOAGULATION SERVICE
Clinical Pharmacy - Warfarin Dosing Consult     Pharmacy has been consulted to manage this patient s warfarin therapy.  Indication: Atrial Fibrillation  Therapy Goal: INR 2-3  Significant drug interactions: amiodarone, aspirin, atorvastatin  Recent documented change in oral intake/nutrition: Unknown    INR   Date Value Ref Range Status   02/01/2021 2.69 (H) 0.86 - 1.14 Final   01/31/2021 2.67 (H) 0.86 - 1.14 Final       Recommend warfarin 2 mg today.  Pharmacy will monitor Jaxon Melendez daily and order warfarin doses to achieve specified goal.      Please contact pharmacy as soon as possible if the warfarin needs to be held for a procedure or if the warfarin goals change.    Debbie Gunderson, ChaceD, BCPS February 1, 2021

## 2021-02-01 NOTE — PLAN OF CARE
D: Patient was admitted on 1/21/2021 for CAD.  S/p CABG x3 on 1/22/21 c/b pneumonia and afib/flutter POD #3 s/p DCCV (cardioversion) x3. PMH ICM EF 10-20%, CAD s/p PCI 2010, HTN, HLD, DM2, tobacco and ETOH abuse    I: Monitored vitals and assessed pt status. Encouraged frequent repositioning and pulmonary hygiene. Scheduled dilaudid 2-4mg q3hrs for pain management.     A: AOx4. VSS on room air. Denies any new pain, shortness of breath. Incisional pain managed with current regiment. Voiding adequately. Up with assist of 1. Blood sugar checks ACHS and 0200.     P: Anticipate possible discharge to rehab today pending referral. Continue to monitor and update CVTS with changes/concerns.

## 2021-02-01 NOTE — PROGRESS NOTES
"   21 0800   Living Arrangements   People in home alone   Able to Return to Prior Arrangements other (see comments)   Living Arrangement Comments Will DC to dtr home, multi-level but stay on main floor    Home Safety   Patient Feels Safe Living in Home? yes   CM/SW Discharge Planning   Patient/Family Anticipates Transition to family members' home   Concerns to be Addressed no discharge needs identified;denies needs/concerns at this time   Concerns Comments Daily alcohol use, denied resources or concerns    Patient/family educated on Medicare website which has current facility and service quality ratings no   Transportation Anticipated family or friend will provide   Anticipated Discharge Disposition (CM/SW) Home Care;Outpatient Rehab (PT, OT, SLP, Cardiac or Pulmonary)   Patient/Family in Agreement with Plan yes       Social Work: Initial Assessment with Discharge Plan    Patient Name: Jaxon Melendez  : 1970  Age: 50 year old  MRN: [id number]  Completed assessment with: Chart review and interview with pt at bedside   Admitted to ARU: 2021    Presenting Information   Date of SW assessment: 2021  Health Care Directive: Provided education and Health Care Directive Agent (if patient not able to make decisions)  Primary Health Care Agent: Patient   Secondary Health Care Agent: Pt children NOK   Living Situation: Lives alone in a house in [facility] [facility]. Single-family home with 2-3 YAN. Dtr lives close to pt. Patient plans to discharge her daughter's home (multilevel home 5-6 stairs to enter but will remain at the main level).   Previous Functional Status: Indep with ADLs and IADLs PTA. No medications prior to admission. Drive and work full-time. Manage own finances.   DME available: None reported   Patient and family understanding of hospitalization: \"Chest pain\". Denied any confusion or changes to memory.   Cultural/Language/Spiritual Considerations: English-speaking, denied identifying with " "specific Episcopal, single/ male.       Physical Health  Reason for admission: CABGx3    Jaxon \"Silvio\" Nora is a 50 year old male with PMH of HLD, HTN, DM II, tobacco use, heavy alcohol use (6-7 drinks daily), ICM with 10-20% EF, CAD s/p PCI to RCA and LCx in 2010. He continueds to smoke. He was more recently seen with retrosternal chest pain exacerbated by exertion, relieved with rest. Troponins were elevated. He was treated for acute coronary syndrome and taken to the cath lab where he was found to have severe multi-vessel disease including in-stent restenosis. His EF was ~10%. He was admitted to [facility] 1/21 and underwent CABG x3, requiring intra-operative cardioversion x1. His hospital course was complicated by Aflutter/Afib w/ RVR requiring cardioversion x3 on 1/25/21, acute hypoxic respiratory failure in setting of unstable tachycardia, aspiration pneumonitis, bradycardia, stress hyperglycemia, and shock. He has also required medical mgmt of his post-operative pain. He is now medically stable and ready for admission to acute rehab.     Provider Information   Primary Care Physician: PCP is Dr. Woodruff at [facility] in [place], [place] (last saw Friday prior to admission, Jan 18th).   : None reported     Mental Health/Chemical Dependency:   Diagnosis: Denied   Alcohol/Tobacco/Narcotis: Current smoker 1 pack, interested in quitting Dtr does not smoke, reports she can be a good support. Alcohol use, reported 6-7 drinks nightly. Denied alcohol treatment and denied resources or need for treatment. Denied other drug use.   Support/Services in Place: None reported   Services Needed/Recommended: Supportive services available by consult (Health Psychology and Lj services)   Sexuality/Intimacy: Not discussed     Support System  Marital Status: , single  Family support: Dtr Jonathan and son Harshal. Harshal lives in [place], [place]. Dtr lives in [facility] [place] [facility]. 1 " "grandchild. Sister lives in [place], [place]. Sister supportive.   Other support available: Few friends.     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 Full-time. Working on getting disability through employer.   Income Source: Wages/salary   Education: High school   Financial Concerns: Stated \"always concerns but working on disability with my employer\"   Insurance: PREFERREDONE/AETNA PREFERREDONE      Discharge Plan   Patient and family discharge goal: Home with HC vs OP pending progress, will go to dtr home  Provided Education on discharge plan: Yes  Patient agreeable to discharge plan:  Yes  Provided education and attained signature for Medicare IM and IRF Patient Rights and Privacy Information provided to patient : N/A  Provided patient with [facility] Resources: N/A  Barriers to discharge: None identified     Discharge Recommendations   Disposition: See above   Transportation Needs: Family assistance   Name of Transportation Company and Phone: N/A     Additional comments   ELOS 10-12 days. Discharge needs pending progress. Pt appeared A&O and pleasant. Denied any concerns, resources or needs at this time. SW will continue to follow and remain available.       Please invite to Care Conference:  Pt eliceo Castillo PH: [phone]    CHECO Champagne, [facility] Acute Rehab Unit   Phone: [phone]  I   Pager: [phone]          "

## 2021-02-02 ENCOUNTER — APPOINTMENT (OUTPATIENT)
Dept: PHYSICAL THERAPY | Facility: CLINIC | Age: 51
End: 2021-02-02
Payer: COMMERCIAL

## 2021-02-02 ENCOUNTER — APPOINTMENT (OUTPATIENT)
Dept: OCCUPATIONAL THERAPY | Facility: CLINIC | Age: 51
End: 2021-02-02
Payer: COMMERCIAL

## 2021-02-02 LAB
ANION GAP SERPL CALCULATED.3IONS-SCNC: 6 MMOL/L (ref 3–14)
BASOPHILS # BLD AUTO: 0.1 10E9/L (ref 0–0.2)
BASOPHILS NFR BLD AUTO: 0.5 %
BUN SERPL-MCNC: 9 MG/DL (ref 7–30)
CALCIUM SERPL-MCNC: 8.8 MG/DL (ref 8.5–10.1)
CHLORIDE SERPL-SCNC: 101 MMOL/L (ref 94–109)
CO2 SERPL-SCNC: 31 MMOL/L (ref 20–32)
CREAT SERPL-MCNC: 0.67 MG/DL (ref 0.66–1.25)
DIFFERENTIAL METHOD BLD: ABNORMAL
EOSINOPHIL # BLD AUTO: 0.3 10E9/L (ref 0–0.7)
EOSINOPHIL NFR BLD AUTO: 3 %
ERYTHROCYTE [DISTWIDTH] IN BLOOD BY AUTOMATED COUNT: 12.6 % (ref 10–15)
GFR SERPL CREATININE-BSD FRML MDRD: >90 ML/MIN/{1.73_M2}
GLUCOSE BLDC GLUCOMTR-MCNC: 112 MG/DL (ref 70–99)
GLUCOSE BLDC GLUCOMTR-MCNC: 112 MG/DL (ref 70–99)
GLUCOSE BLDC GLUCOMTR-MCNC: 140 MG/DL (ref 70–99)
GLUCOSE BLDC GLUCOMTR-MCNC: 141 MG/DL (ref 70–99)
GLUCOSE BLDC GLUCOMTR-MCNC: 152 MG/DL (ref 70–99)
GLUCOSE SERPL-MCNC: 114 MG/DL (ref 70–99)
HCT VFR BLD AUTO: 31.8 % (ref 40–53)
HGB BLD-MCNC: 10.4 G/DL (ref 13.3–17.7)
IMM GRANULOCYTES # BLD: 0.2 10E9/L (ref 0–0.4)
IMM GRANULOCYTES NFR BLD: 2 %
INR PPP: 2.42 (ref 0.86–1.14)
LYMPHOCYTES # BLD AUTO: 2.4 10E9/L (ref 0.8–5.3)
LYMPHOCYTES NFR BLD AUTO: 21.6 %
MCH RBC QN AUTO: 31.3 PG (ref 26.5–33)
MCHC RBC AUTO-ENTMCNC: 32.7 G/DL (ref 31.5–36.5)
MCV RBC AUTO: 96 FL (ref 78–100)
MONOCYTES # BLD AUTO: 0.8 10E9/L (ref 0–1.3)
MONOCYTES NFR BLD AUTO: 7.1 %
NEUTROPHILS # BLD AUTO: 7.4 10E9/L (ref 1.6–8.3)
NEUTROPHILS NFR BLD AUTO: 65.8 %
NRBC # BLD AUTO: 0 10*3/UL
NRBC BLD AUTO-RTO: 0 /100
PLATELET # BLD AUTO: 342 10E9/L (ref 150–450)
POTASSIUM SERPL-SCNC: 4.3 MMOL/L (ref 3.4–5.3)
RBC # BLD AUTO: 3.32 10E12/L (ref 4.4–5.9)
SODIUM SERPL-SCNC: 138 MMOL/L (ref 133–144)
WBC # BLD AUTO: 11.3 10E9/L (ref 4–11)

## 2021-02-02 PROCEDURE — 99222 1ST HOSP IP/OBS MODERATE 55: CPT | Performed by: PHYSICIAN ASSISTANT

## 2021-02-02 PROCEDURE — 128N000003 HC R&B REHAB

## 2021-02-02 PROCEDURE — 250N000013 HC RX MED GY IP 250 OP 250 PS 637: Performed by: PHYSICAL MEDICINE & REHABILITATION

## 2021-02-02 PROCEDURE — 250N000013 HC RX MED GY IP 250 OP 250 PS 637: Performed by: PHYSICIAN ASSISTANT

## 2021-02-02 PROCEDURE — 99233 SBSQ HOSP IP/OBS HIGH 50: CPT | Mod: GC | Performed by: PHYSICAL MEDICINE & REHABILITATION

## 2021-02-02 PROCEDURE — 97530 THERAPEUTIC ACTIVITIES: CPT | Mod: GO

## 2021-02-02 PROCEDURE — 85025 COMPLETE CBC W/AUTO DIFF WBC: CPT | Performed by: PHYSICIAN ASSISTANT

## 2021-02-02 PROCEDURE — G0463 HOSPITAL OUTPT CLINIC VISIT: HCPCS

## 2021-02-02 PROCEDURE — 97535 SELF CARE MNGMENT TRAINING: CPT | Mod: GO

## 2021-02-02 PROCEDURE — 80048 BASIC METABOLIC PNL TOTAL CA: CPT | Performed by: PHYSICIAN ASSISTANT

## 2021-02-02 PROCEDURE — 85610 PROTHROMBIN TIME: CPT | Performed by: PHYSICIAN ASSISTANT

## 2021-02-02 PROCEDURE — 97162 PT EVAL MOD COMPLEX 30 MIN: CPT | Mod: GP

## 2021-02-02 PROCEDURE — 999N001017 HC STATISTIC GLUCOSE BY METER IP

## 2021-02-02 PROCEDURE — 36415 COLL VENOUS BLD VENIPUNCTURE: CPT | Performed by: PHYSICIAN ASSISTANT

## 2021-02-02 PROCEDURE — 99207 PR CONSULT E&M CHANGED TO INITIAL LEVEL: CPT | Performed by: PHYSICIAN ASSISTANT

## 2021-02-02 PROCEDURE — 97530 THERAPEUTIC ACTIVITIES: CPT | Mod: GP

## 2021-02-02 PROCEDURE — 97165 OT EVAL LOW COMPLEX 30 MIN: CPT | Mod: GO

## 2021-02-02 PROCEDURE — 250N000012 HC RX MED GY IP 250 OP 636 PS 637: Performed by: PHYSICIAN ASSISTANT

## 2021-02-02 RX ORDER — METHOCARBAMOL 500 MG/1
500 TABLET, FILM COATED ORAL 4 TIMES DAILY PRN
Status: DISCONTINUED | OUTPATIENT
Start: 2021-02-02 | End: 2021-02-05 | Stop reason: HOSPADM

## 2021-02-02 RX ORDER — WARFARIN SODIUM 2.5 MG/1
2.5 TABLET ORAL
Status: COMPLETED | OUTPATIENT
Start: 2021-02-02 | End: 2021-02-02

## 2021-02-02 RX ORDER — AMOXICILLIN 250 MG
1 CAPSULE ORAL DAILY PRN
Status: DISCONTINUED | OUTPATIENT
Start: 2021-02-02 | End: 2021-02-05 | Stop reason: HOSPADM

## 2021-02-02 RX ADMIN — PANTOPRAZOLE SODIUM 40 MG: 40 TABLET, DELAYED RELEASE ORAL at 06:22

## 2021-02-02 RX ADMIN — METHOCARBAMOL 500 MG: 500 TABLET ORAL at 08:42

## 2021-02-02 RX ADMIN — AMIODARONE HYDROCHLORIDE 400 MG: 200 TABLET ORAL at 08:41

## 2021-02-02 RX ADMIN — METFORMIN HYDROCHLORIDE 500 MG: 500 TABLET ORAL at 08:42

## 2021-02-02 RX ADMIN — ACETAMINOPHEN 975 MG: 325 TABLET, FILM COATED ORAL at 08:42

## 2021-02-02 RX ADMIN — HYDROMORPHONE HYDROCHLORIDE 4 MG: 2 TABLET ORAL at 21:38

## 2021-02-02 RX ADMIN — FUROSEMIDE 40 MG: 20 TABLET ORAL at 08:42

## 2021-02-02 RX ADMIN — DOCUSATE SODIUM AND SENNOSIDES 1 TABLET: 8.6; 5 TABLET ORAL at 08:41

## 2021-02-02 RX ADMIN — LISINOPRIL 5 MG: 2.5 TABLET ORAL at 08:41

## 2021-02-02 RX ADMIN — ASPIRIN 81 MG: 81 TABLET, COATED ORAL at 08:42

## 2021-02-02 RX ADMIN — POTASSIUM CHLORIDE 20 MEQ: 750 TABLET, EXTENDED RELEASE ORAL at 20:27

## 2021-02-02 RX ADMIN — THIAMINE HCL TAB 100 MG 100 MG: 100 TAB at 08:42

## 2021-02-02 RX ADMIN — INSULIN GLARGINE 12 UNITS: 100 INJECTION, SOLUTION SUBCUTANEOUS at 09:33

## 2021-02-02 RX ADMIN — ACETAMINOPHEN 975 MG: 325 TABLET, FILM COATED ORAL at 20:27

## 2021-02-02 RX ADMIN — MULTIPLE VITAMINS W/ MINERALS TAB 1 TABLET: TAB at 08:42

## 2021-02-02 RX ADMIN — WARFARIN SODIUM 2.5 MG: 2.5 TABLET ORAL at 17:01

## 2021-02-02 RX ADMIN — FUROSEMIDE 40 MG: 20 TABLET ORAL at 16:52

## 2021-02-02 RX ADMIN — POTASSIUM CHLORIDE 20 MEQ: 750 TABLET, EXTENDED RELEASE ORAL at 08:42

## 2021-02-02 RX ADMIN — ACETAMINOPHEN 975 MG: 325 TABLET, FILM COATED ORAL at 14:10

## 2021-02-02 RX ADMIN — INSULIN ASPART 1 UNITS: 100 INJECTION, SOLUTION INTRAVENOUS; SUBCUTANEOUS at 12:36

## 2021-02-02 RX ADMIN — ATORVASTATIN CALCIUM 40 MG: 40 TABLET, FILM COATED ORAL at 08:41

## 2021-02-02 RX ADMIN — FOLIC ACID 1 MG: 1 TABLET ORAL at 08:42

## 2021-02-02 ASSESSMENT — ACTIVITIES OF DAILY LIVING (ADL): PREVIOUS_RESPONSIBILITIES: MEAL PREP;HOUSEKEEPING;LAUNDRY;SHOPPING;YARDWORK;MEDICATION MANAGEMENT;FINANCES;DRIVING

## 2021-02-02 ASSESSMENT — MIFFLIN-ST. JEOR: SCORE: 1733.08

## 2021-02-02 NOTE — PLAN OF CARE
FOCUS/GOAL  Safety management    ASSESSMENT, INTERVENTIONS AND CONTINUING PLAN FOR GOAL:  Pt is AOx4, able to make needs known, using call light appropriately.  Denies pain on writers shift, VSS, denies cough, SOB. A1 with walker.  Nursing will continue to monitor.

## 2021-02-02 NOTE — PLAN OF CARE
Discharge Planner Post-Acute Rehab OT:     Discharge Plan: to dtr's house where dtr can provide 24/7 supervision, HH vs OP OT    Precautions: sternal    Current Status:  ADLs: Pt recalling sternal precautions with min cues, generally adhering well with transfers.  Pt SBA progressing to distant supervision in the room and bathroom, cues for safe FWW use.  Full shower and ADL assessment tomorrow.   IADLs: Pt previously independent but reporting difficulty managing meds at home.  Recommend supervision for IADL initially at discharge.  Vision/Cognition: Pt with baseline memory deficits, reports he utilizes writing down information and having daughter keep track of things.  Pt scoring 6/28 on the short blessed test today where scores 5-9 indicate questionable impairment.  Pt would likely benefit from cognitive/neuropsych assessment to establish baseline and support safety with complex IADL including driving.      Assessment: Pt moving well and close to mod I with basic ADL.  Pt with new onset L hand weakness/incoordination as well as baseline cognitive deficits impacting safety with ADL/IADL.  Pt with excellent supports in place at home, ELOS 4 days to discharge home with dtr and HH vs OP OT.      Other Barriers to Discharge (DME, Family Training, etc): recommend shower chair for home

## 2021-02-02 NOTE — PROGRESS NOTES
WOC Consult    S: WOC Consult to evaluate and treat hydradenitis suppurativa on the buttocks.    B: Per Dr Eleanor Marion on 2/1/2021: Jaxon Melendez is a 50 year old male with past medical history of hyperlipidemia, hypertension, DM 2, hidradenitis suppurativa in the presacral region, tobacco use disorder, alcohol use disorder, ischemic cardiomyopathy with ejection fraction of 10 to 20%, CAD s/p PCI to RCA and LCx in 2010. He started having left sided chest pain while working in the cold, patient had previous episodes of chest pain in the past, so he came to the hospital and was admitted to [facility] on 1/21/2021 for retrosternal chest pain exacerbated by exertion and relieved by rest.  Troponins were elevated with 25-69.  He was treated for coronary artery syndrome, was taken to Cath Lab 1/22/2021 and was found to have multiple vessel disease including in-stent restenosis and received an IABP 1/22/2021 and was deemed appropriate for CABG.  Patient went through CABG x3 with intraoperative cardioversion x1.  Postop course patient had respiratory instability and was transferred to ICU on third postop day he developed atrial flutter with RVR, required intubation and cardioversion x3 on 1/25/2021.  He was started on IV amiodarone and required hemodynamic support.  Patient was extubated on 1/26/2021 and had intermittent A. fib post extubation.  He was started on heparin drip and maintained sinus bradycardia in 50s and was transferred to postsurgical telemetry on 1/20/2021.  He was weaned off of oxygen, continued amiodarone, EP saw and recommended 400 mg p.o. amiodarone daily due to bradycardia is in the low 60s.  Patient difficulty with pain management postop.  And was not scheduled Tylenol, Robaxin and p.o. Dilaudid.  Patient was discharged to acute rehab floor on 2/1/2021.    A/P: Chart assessed. Patient last assessed on 2/1/2021 just prior to transfer to ARU. Full assessment of wounds including photos can be found in  note from Madison Joshua RN CWOCN on 2/1/2021. Plan of care written and appropriate.     Patient will be seen weekly by WOC RN for follow up. Orders for wound care written based off assessment 2/1/2021.    Taty Ferrell RN, CWOCN

## 2021-02-02 NOTE — PROGRESS NOTES
"   02/02/21 1009   Quick Adds   Type of Visit Initial Occupational Therapy Evaluation   Living Environment   People in home alone   Current Living Arrangements house   Home Accessibility stairs within home;stairs to enter home   Number of Stairs, Main Entrance 5   Number of Stairs, Within Home, Primary 10   Transportation Anticipated family or friend will provide   Living Environment Comments Pt lives alone in a house with 2-3 YAN.  He plans to stay with dtr for some time at d/c.  Dtr's home has 5-6 YAN, pt can remain on main level except tub/shower is on the upper floor with 1 flight of stairs.  Pt also notes that sister lives in town and had a tub on the main level.  Pt has a tub/shower at home.     Self-Care   Usual Activity Tolerance moderate   Current Activity Tolerance fair   Regular Exercise No   Equipment Currently Used at Home none   Activity/Exercise/Self-Care Comment PT: IND with all ADLs and mobility at baseline. 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 Pt hopes to return to work. Reports drinking daily.    Disability/Function   Hearing Difficulty or Deaf no   Wear Glasses or Blind yes   Vision Management reading glasses   Concentrating, Remembering or Making Decisions Difficulty yes   Concentration Management Pt reporting memory deficits, compensates by having information written down or relying on dtr   Difficulty Communicating no   Difficulty Eating/Swallowing no   Walking or Climbing Stairs Difficulty no   Dressing/Bathing Difficulty no   Toileting issues no   Doing Errands Independently Difficulty (such as shopping) other (see comments)  (pt denies but also reporting difficulty managing meds)   Fall history within last six months yes   Change in Functional Status Since Onset of Current Illness/Injury yes   General Information   Onset of Illness/Injury or Date of Surgery 01/22/21   Referring Physician Rogelio Wong MD   Patient/Family Therapy Goal " Statement (OT) to get stronger, go home   Additional Occupational Profile Info/Pertinent History of Current Problem Per chart: Jaxon Melendez is a 50 year old male with a history of CAD s/p PCI to RCA and LCx (2010), DM2, HTN, HLD, and alcohol abuse admitted to OSH with ACS, found to have multivessel disease including in-stent restenosis and EF of 10%. Transferred to [facility] on 1/21/21 and underwent 3 vessel CABG on 1/22. Post-op course c/b acute hypoxic respiratory failure in setting of unstable wide complex tachycardia s/p DCCV x 3 and IV Amiodarone. Transferred to TCU on 2/1/21 for rehabilitation.   Existing Precautions/Restrictions sternal   Left Upper Extremity (Weight-bearing Status) other (see comments)  (no lifitng >10#)   Right Upper Extremity (Weight-bearing Status) other (see comments)  (no lifitng >10#)   Cognitive Status Examination   Orientation Status orientation to person, place and time   Affect/Mental Status (Cognitive) WFL   Safety Deficit minimal deficit;safety precautions follow-through/compliance   Memory Deficit moderate deficit;short-term memory;working memory   Executive Function Deficit insight/awareness of deficits;self-monitoring/self-correction   Cognitive Status Comments Pt reporting long-standing memory issues, relies on written information and daughter.  Pt was working and driving.  Pt reports feeling like he is back to baseline.  Pt does report having trouble managing meds at home.  Completed SBT with pt scoring 6/28   Visual Perception   Visual Impairment/Limitations corrective lenses for reading   Impact of Vision Impairment on Function (Vision) Pt noting vision changes recently, thinks he is due for a new set of glasses   Sensory   Sensory Comments reporting some numbness in L hand   Pain Assessment   Patient Currently in Pain Yes, see Vital Sign flowsheet  (2/10)   Integumentary/Edema   Integumentary/Edema Comments Mild edema in L hand   Range of Motion Comprehensive   Comment,  General Range of Motion B shoulder, elbows, wrists WFL.  L hand with impaired finger extension, abduction, and isolation over the last 2 days   Strength Comprehensive (MMT)   General Manual Muscle Testing (MMT) Assessment hand strength deficits identified   Comment, General Manual Muscle Testing (MMT) Assessment Not tested 2/2 sternal precautions, but grossly WFL except for L hand (see below)   Hand Strength   Left hand  (pounds) 27.5   Right hand  (pounds) 55   Hand Strength Comments Onset over the last 2 days   Coordination   Left hand, Box and Block test (cubes transferred in 1 minute) 33   Right hand, Box and Block test (cubes transerred in 1 minute) 35   Functional Limitations Decreased speed;Fine motor ADL performance impaired;Impaired ability to perform bilateral tasks;Object transport impaired   Coordination Comments Pt endorsing increased pain and effort with L hand box and blocks   ARC Assessment Only   Acute Rehab Functional Assessment See IP Rehab Daily Documentation Flowsheet for Functional Mobility/ADL Assessment   Instrumental Activities of Daily Living (IADL)   Previous Responsibilities meal prep;housekeeping;laundry;shopping;yardwork;medication management;finances;driving   IADL Comments Dtr to provide assist initially at d/c.     Clinical Impression   Criteria for Skilled Therapeutic Interventions Met (OT) yes;skilled treatment is necessary   OT Diagnosis decreased safety and I with ADL/IADL   OT Problem List-Impairments impacting ADL problems related to;activity tolerance impaired;cognition;post-surgical precautions;coordination   Assessment of Occupational Performance 1-3 Performance Deficits   Identified Performance Deficits bathing, IADL, dressing   Planned Therapy Interventions (OT) ADL retraining;IADL retraining;bed mobility training;cognition;fine motor coordination training;strengthening;stretching;transfer training   Clinical Decision Making Complexity (OT) low complexity   Therapy  Frequency (OT) Daily   Predicted Duration of Therapy 5 days   Anticipated Equipment Needs Upon Discharge (OT) shower chair   Risk & Benefits of therapy have been explained evaluation/treatment results reviewed;care plan/treatment goals reviewed;risks/benefits reviewed;current/potential barriers reviewed;participants voiced agreement with care plan;participants included;patient;daughter   Comment-Clinical Impression Pt presents with impaired ADL/IADL 2/2 the above deficits.  Pt will benefit from skilled OT to address and maximize safety and I with ADL/IADL within precautions   OT Discharge Planning    OT Discharge Recommendation (DC Rec) Home with assist;home with home care occupational therapy;home with outpatient occupational therapy   OT Rationale for DC Rec Pt will benefit from assist for heavy IADL 2/2 sternal precautions, supervision for IADL prn for cognition.  HH vs OP OT to progress L hand and cognition   OT Brief overview of current status  Pt SBA to supervision for basic ADL, cues for FWW safety   Total Evaluation Time (Minutes)   Total Evaluation Time (Minutes) 20

## 2021-02-02 NOTE — PLAN OF CARE
FOCUS/GOAL  Bladder management, Pain management, and Safety management    ASSESSMENT, INTERVENTIONS AND CONTINUING PLAN FOR GOAL:  Patient is alert and oriented x 4. Able to make his needs known. Also uses his call light appropriately. Incision on chest dry and intact and is open to air. Dressings (foam) on sacrum and buttocks present. Per patient, he had surgery done 5 years ago to remove his cyst on those sites (sacrum and buttocks). Sticky note left for MD if patient will need to be seen by WOC RN. Continent with both bowel and bladder. Uses the urinal at bedside. Transfers with assist of 1 using walker.  and 192. PRN Dilaudid given twice this shift for post op pain on chest which helped.

## 2021-02-02 NOTE — PROGRESS NOTES
"  General acute hospital   Acute Rehabilitation Unit  Daily progress note    INTERVAL HISTORY  Jaxon Melendez was seen and examined at bedside lying comfortably in bed.  He said that he was able to sleep on and off over the night and kept waking up every hour.  He denies any new complaints.  Again had a discussion with the patient about getting an addiction medicine consult, to which he said that he will think about it.    He denies any nausea, vomiting, headache, chest pain, shortness of breath, abdominal pain.    Patient will get his therapy evaluations today.        MEDICATIONS    acetaminophen  975 mg Oral TID     [START ON 2/8/2021] amiodarone  200 mg Oral Daily     amiodarone  400 mg Oral Daily     aspirin  81 mg Oral QAM     atorvastatin  40 mg Oral QAM     folic acid  1 mg Oral Daily     furosemide  40 mg Oral BID     insulin aspart  1-10 Units Subcutaneous TID AC     insulin aspart  1-7 Units Subcutaneous At Bedtime     insulin glargine  12 Units Subcutaneous QAM AC     lidocaine  1-3 patch Transdermal Q24H     lidocaine   Transdermal Q8H     lisinopril  5 mg Oral Daily     metFORMIN  500 mg Oral Daily with breakfast     methocarbamol  500 mg Oral 4x Daily     multivitamin w/minerals  1 tablet Oral Daily     pantoprazole  40 mg Oral QAM AC     potassium chloride ER  20 mEq Oral BID     senna-docusate  1 tablet Oral Daily     thiamine  100 mg Oral Daily        albuterol, glucose **OR** dextrose **OR** glucagon, HYDROmorphone, naloxone **OR** naloxone **OR** naloxone **OR** naloxone, nitroGLYcerin, - MEDICATION INSTRUCTIONS -, polyethylene glycol, Warfarin Therapy Reminder     PHYSICAL EXAM  /51 (BP Location: Right arm)   Pulse 69   Temp 96.6  F (35.9  C) (Oral)   Resp 24   Ht 1.778 m (5' 10\")   Wt 86.2 kg (190 lb)   SpO2 97%   BMI 27.26 kg/m    Gen: Alert, oriented, sitting comfortably in bed.  Not in acute distress.  HEENT: Neck supple, extraocular movements " intact.  Cardio: S1-S2 present, regular, rate, rhythm, no murmurs.  Pulm: Clear to auscultation bilaterally, breathing comfortably on room air.  Abd: soft, non-tender, audible bowel sounds  Ext: no pitting edema   Neuro/MSK: answers questions appropriately, moves limbs spontaneously, weakness in left index and thumb extension.  Skin: Visible skin without any skin changes.    LABS  CBC RESULTS:   Recent Labs   Lab Test 02/02/21  0519   WBC 11.3*   RBC 3.32*   HGB 10.4*   HCT 31.8*   MCV 96   MCH 31.3   MCHC 32.7   RDW 12.6         Last Comprehensive Metabolic Panel:  Sodium   Date Value Ref Range Status   02/02/2021 138 133 - 144 mmol/L Final     Potassium   Date Value Ref Range Status   02/02/2021 4.3 3.4 - 5.3 mmol/L Final     Chloride   Date Value Ref Range Status   02/02/2021 101 94 - 109 mmol/L Final     Carbon Dioxide   Date Value Ref Range Status   02/02/2021 31 20 - 32 mmol/L Final     Anion Gap   Date Value Ref Range Status   02/02/2021 6 3 - 14 mmol/L Final     Glucose   Date Value Ref Range Status   02/02/2021 114 (H) 70 - 99 mg/dL Final     Urea Nitrogen   Date Value Ref Range Status   02/02/2021 9 7 - 30 mg/dL Final     Creatinine   Date Value Ref Range Status   02/02/2021 0.67 0.66 - 1.25 mg/dL Final     GFR Estimate   Date Value Ref Range Status   02/02/2021 >90 >60 mL/min/[1.73_m2] Final     Comment:     Non  GFR Calc  Starting 12/18/2018, serum creatinine based estimated GFR (eGFR) will be   calculated using the Chronic Kidney Disease Epidemiology Collaboration   (CKD-EPI) equation.       Calcium   Date Value Ref Range Status   02/02/2021 8.8 8.5 - 10.1 mg/dL Final     Bilirubin Total   Date Value Ref Range Status   01/23/2021 0.9 0.2 - 1.3 mg/dL Final     Alkaline Phosphatase   Date Value Ref Range Status   01/23/2021 62 40 - 150 U/L Final     ALT   Date Value Ref Range Status   01/23/2021 10 0 - 70 U/L Final     AST   Date Value Ref Range Status   01/23/2021 21 0 - 45 U/L  Final             ASSESSMENT     Jaxon Melendez is a 50 year old male with past medical history of hyperlipidemia, hypertension, DM 2, hidradenitis suppurativa in the presacral region, tobacco use disorder, alcohol use disorder, ischemic cardiomyopathy with ejection fraction of 10 to 20%, CAD s/p PCI to RCA and LCx in 2010, was admitted to [facility] on 1/21/2021 for retrosternal chest pain and underwent CABG x3 for multivessel disease(left internal mammary artery, left anterior descending artery, saphenous vein graft to first diagonal artery, saphenous vein graft to obtuse marginal artery) on 1/22/2021.     Admission to acute inpatient rehab: February 1, 2021  Impairment group code: 09 Cardiac: CABGx3    PLAN  Rehabilitation - continue comprehensive acute inpatient rehabilitation program with multidisciplinary approach including therapies, rehab nursing, and physiatry following. See interval history for updates.      Medical Conditions  #S/p CABG x3  #Coronary artery disease  #Ischemic cardiomyopathy with low ejection fraction of 30-35%  Patient has rapid wide-complex tachycardia with cardioversion x3 on 1/25/2021 and amiodarone bolus was given.  EP was consulted and recommended amiodarone for 8 weeks.  Amiodarone load given on 1/27/2021 and transition 400 mg daily due to heart rate is in 50s on 1/29/2021.  Warfarin bridged with heparin infusion.  Echocardiogram done on 1/29/2021 showed ejection fraction of 30 to 25%, no LifeVest per EP.  Will follow with outpatient EP with echo in 3 months.  -Daily weights  -Sternal precautions, not lifting weight more than 10 pounds for 6 weeks.  -Continue amiodarone 400 mg daily and then 200 mg daily(starting 2/8/2021)  -Aspirin 81 mg daily  -Continue warfarin daily dosed by pharmacy.  -Hold beta-blockers as patient is bradycardic.  -Furosemide 40 mg twice daily.  -NTG 0.4 mg ever 5 min prn for chest pain.     #Postoperative pain management  Patient was continued on scheduled  Tylenol and Robaxin with Dilaudid 4 mg every 3 hours.  As patient has history of alcohol abuse he may require higher amount of medications for pain management.  -Continue scheduled Tylenol, Robaxin  -Continue as needed dilaudid 2-4 mg q4 hours  -Lidocaine patches q shift.     #Left hand weakness  Noted that he had temporary paresthesia in fingers b/l after surgery which resolved. Has noted some weakness in his left hand since last night 1/31 with no paresthesia or numbness. Exam showed weakness with finger ext at 2/5, WE was 4-/5, and other radially innervated muscles were also relatively weak but as significant as EDC.  -Will monitor and discuss with the therapy team regarding ROM and strengthening exercises   -Will need NCS/EMG as outpatient if persistent .     #Acute postop respiratory failure(resolving)  #Aspiration pneumonia?  Patient was reintubated postop.  On 1/21/2021 and extubated on 1/26/2021 and improved with diuresis.  Patient infective work-up was negative for the UA, Gram stain negative for NG tube cultures.  Was started on cefepime and vancomycin (1/25-1/28/2021).  -Albuterol nebs as needed.  -We will continue to monitor.     #Diabetes mellitus  -Continue Lantus 12 units on 1/29/2021(PTA dose of 25 units)  -Metformin 500 mg daily with breakfast.  -Continue high dose sliding scale     #Hypertension  -Continue lisinopril 5 mg daily(started on 1/30/2021)     #Hyperlipidemia  -Continue atorvastatin 40 mg daily     #Alcohol use disorder  #Tobacco use disorder  -Education for alcohol and tobacco cessation  -Continue thiamine/folate.  -Continue gabapentin as above.     #Hypospadias  -Blackwell discontinued on 1/27/2021, voiding independently.     #Hidradenitis suppurativa in the presacral region  -Wound nurse consult done on 2/1/2021, appreciate recs  -As per wound nurse consult note.  Sacral and buttock wounds: Every 3 days     Cleanse the area with Microklenz and pat dry.    Apply No sting film barrier to  periwound skin.    Cover wound with Mepilex. Sacral Mepilex (#[id number])     Change dressing Q 3 days or more frequently for drainage.    If Mepilex requiring changing more than once a shift ok to switch to cleanse BID and apply gauze dressings where able.      1. Adjustment to disability:  Clinical psychology to eval and treat as indicated  2. FEN: regular diet, thin liquids  3. Bowel: continent  4. Bladder: continent  5. DVT Prophylaxis: warfarin and aspirin  6. GI Prophylaxis: pantoprazole 40 mg daily  7. Code: Full   8. Disposition: Home   9. ELOS: 5-7 days  10. Rehab prognosis:  Good  11. Follow up Appointments on Discharge: EP(cardiology) in 3 months with ECHO, PCP in 1 week.     Patient was seen and discussed with my staff physician Dr. Marion, who agrees with my assessment and plan.     Rogelio Wong   PGY 2  Physical Medicine and Rehabilitation  Pager: [phone]          I, Eleanor Marion MD, saw this patient with my resident Dr. Wong and agree with the findings and plan of care as documented in his note.     I personally reviewed the chart (vitals signs, medications, and labs).     My key findings and young manege ment decisions made by me:  Silvio admitted yesterday and started his rehab course today. He has long standing h/o cognitive deficits with no formal evaluation in the past. Will benefit from neuropsych evaluation, as IP if possible otherwise as outpatient. Will have a short stay as anticipated; PT with work on balance and safety and OT will focus of functional cognition.     From medical perspective we will try to   --taper down dilaudid as much as tolerated   --changed robaxin to prn and will possibly discontinue at discharge  --monitor weight and finalize the dose for lasix  --monitor leukocytosis and any clinical s/s of infection   --finalize the plan for DM management     With nursing he needs education for DM management and insulin injections, wound care (sternal and sacral areas) and  anticoagulation.     Will plan for discharge to home on Friday 2/5 with outpatient therapies.     I spent a total of 40 minutes face-to-face and managing the care of Jaxon Melendez. Over 50% of my time on the unit was spent counseling the patient and coordinating care. See note for details.

## 2021-02-02 NOTE — PLAN OF CARE
Patient is A&O x 4. Pain managed with scheduled Tylenol 975 mg Sternal precautions, incision well approximated with liquid bandage.drain sites covered with a dressing. Transfers with a walker to . CMS intact. BG's 114 and 140. Cont of B/B. LBM 2/2. New orders for WOCN to assess hidradenitis suppurativa in the gluteal area. Continue with POC.

## 2021-02-02 NOTE — PROGRESS NOTES
02/02/21 1300   Signing Clinician's Name / Credentials   Signing clinician's name / credentials CORBIN Hi   Functional Gait Assessment (JACIEL Stoner., EMI Berger, et al. (2004))   1. GAIT LEVEL SURFACE 2   2. CHANGE IN GAIT SPEED 1   3. GAIT WITH HORIZONTAL HEAD TURNS 2   4. GAIT WITH VERTICAL HEAD TURNS 3   5. GAIT AND PIVOT TURN 2   6. STEP OVER OBSTACLE 2   7. GAIT WITH NARROW BASE OF SUPPORT 3   8. GAIT WITH EYES CLOSED 2   9. AMBULATING BACKWARDS 2   10. STEPS 2   Total Functional Gait Assessment Score   TOTAL SCORE: (MAXIMUM SCORE 30) 21     Functional Gait Assessment (FGA): The FGA assesses postural stability during various walking tasks.   Gait assistive device used: None    Patient Score: 21/30  Scores of <22/30 have been correlated with predicting falls in community-dwelling older adults according to Herbie & Sterling 2010.   Scores of <18/30 have been correlated with increased risk for falls in patients with Parkinsons Disease according to Jovan Larry Zhou et al 2014.  Minimal Detectable Change for patients with acute/chronic stroke = 4.2 according to Thistevan & Ritschel 2009  Minimal Detectable Change for patients with vestibular disorder = 8 according to Herbie & Sterling 2010    Assessment (rationale for performing, application to patient s function & care plan): Completed test to assess dynamic balance and to establish a baseline objective measure of pt's performance. Pt's score of 21/30 is less than the average score for age matched norms (28.9/30 according to Walker et al. 2007). Pt's performance is likely below baseline. A majority of points were deducted due to slow gait speed and not due to gait deviations or LOB. Corrective steps needed during horizontal head turns and tandem walking. Currently recommending continued use of walker outside of PT.     MCID: 4 points for community dwelling older adults (MCID not yet determined in pt's age group)    Minutes billed as physical performance  test: 20 minutes

## 2021-02-02 NOTE — PLAN OF CARE
Discharge Planner Post-Acute Rehab PT:     Discharge Plan: to daughter's home with 24/7 supervision     Precautions: sternal     Current Status:  Bed Mobility: IND from elevated bed, follows sternal precuations  Transfer: FWW and SBA   Gait: ~200ft with FWW and SBA, 2x50ft without AD and CGA   Stairs: 12 stairs, reciprocal step pattern, 1 rail, SBA   Balance: intact sitting and standing without UE support     Assessment: Pt presents with no significant functional mobility impairments that are barriers to returning home. Deficits are related to deconditioning s/p CABGx3 and 1.5 week hospital stay. Pt will benefit from strength and conditioning during stay and OP cardiac rehab upon discharge. Pt will be safe to return home with 24/7 assist from daughter when medically stable.     Other Barriers to Discharge (DME, Family Training, etc): none

## 2021-02-02 NOTE — CONSULTS
[facility]   Consult Note - Hospitalist Service     Date of Admission:  2/1/2021  Consult Requested by: Dr. Marion  Reason for Consult: S/p CABG    Assessment & Plan   Jaxon Melendez is a 50 year old male with a history of CAD s/p PCI to RCA and LCx (2010), DM2, HTN, HLD, and alcohol abuse admitted to OSH with ACS, found to have multivessel disease including in-stent restenosis and EF of 10%. Transferred to [facility] on 1/21/21 and underwent 3 vessel CABG on 1/22. Post-op course c/b acute hypoxic respiratory failure in setting of unstable wide complex tachycardia s/p DCCV x 3 and IV Amiodarone. Transferred to TCU on 2/1/21 for rehabilitation.    #CAD s/p 3v CABG (1/22/21) and PCI to RCA and LCx (2010)  #ICM (LVEF 30-35%)   Post-op Echo (1/29/21) with EF 30-35%, no WMAs, indeterminate diastolic function. No lifevest per EP. Appears nearly euvolemic. Weight 190 lbs (195 lbs). Pain controlled.  - Lasix 40 mg BID + KCl 20 mEq BID. Daily weights, I/Os  - ASA 81 mg daily and Atorvastatin  - Lisinopril 5 mg daily. BB deferred d/t recent bradycardia.  - Pain: Tylenol TID, Robaxin QID, Lidocaine patches, Dilaudid 2-4 mg q 4h PRN (taper as able)  - Repeat Echo in 3 months    #Wide Complex Tachycardia - On 1/25/21. Suspected AF with aberrancy. S/p DDCV x 3 and IV Amiodarone. CHADSVasc 4.  - Amiodarone 400 mg daily until 2/8, then 200 mg daily until 3/29 to complete 8 week course.  - Coumadin per pharmacy dosing. INR goal 2-3.     #Acute Blood Loss Anemia - BL Hgb 14-15. Hgb stable ~9-10 post-op. No s/sx of bleeding.  - CBC q M/Th    #Mild Leukocytosis - Intermittent since surgery likely 2/2 stress response. Peak of 37.1 on 1/24. WBC 11.3 (11.6) on 2/2. Afebrile. No s/sx of infection.  - Pan-culture if febrile >100.4    #DM2 - A1c 11.4 on 1/21. On Metformin PTA. BG controlled.  - Lantus 12 units daily + Metformin 500 mg daily  - Discontinue sliding scale   - Diabetes education prior to  discharge    #HTN - BP normotensive.  - Lisinopril 5 mg daily (started 1/30)    #Hx of Alcohol Abuse - Consuming 5-6 drinks daily PTA.   - Folic acid, MVI, Thiamine    #Sacral and Buttocks Wounds 2/2 Hidradenitis Suppurativa  - WOCN following.    Brittany Pradhan PA-C  [facility]   Contact information available via [facility] Paging/Directory  _________________________________________________________________    Chief Complaint   Deconditioning    History is obtained from the patient and electronic health record    History of Present Illness   Jaxon Melendez is a 50 year old male with a history of CAD s/p PCI to RCA and LCx (2010), DM2, HTN, HLD, alcohol abuse, and tobacco abuse admitted to OSH with ACS, found to have multivessel disease including in-stent restenosis and EF of 10%. Transferred to [facility] on 1/21/21 for further management. He underwent 3 vessel CABG on 1/22/21 with Dr. Gibbons. Post-op course c/b acute hypoxic respiratory failure in setting of unstable wide complex tachycardia s/p DCCV x 3 and IV Amiodarone. Transferred to TCU on 2/1/21 for rehabilitation.    Currently, Silvio is feeling well. Pain controlled on current regimen, has not required opiates for ~12 hours. Having regular BMs. Denies SOB, n/v/c/d, abdominal pain, or dysuria.    Review of Systems   The 10 point Review of Systems is negative other than noted in the HPI or here.     Past Medical History    I have reviewed this patient's medical history and updated it with pertinent information if needed.   Past Medical History:   Diagnosis Date     Acute systolic heart failure (H)     EF 10%     CAD (coronary artery disease)      Diabetes mellitus, type 2 (H)     uncontrolled     Essential hypertension      Hidradenitis suppurativa     presacral area     Hyperlipidemia LDL goal <100      NSTEMI (non-ST elevated myocardial infarction) (H)        Past Surgical History   I have reviewed this patient's surgical history  and updated it with pertinent information if needed.  Past Surgical History:   Procedure Laterality Date     ANGIOGRAM      2010, 2021     BYPASS GRAFT ARTERY CORONARY N/A 1/22/2021    Procedure: Median Sternotomy, Takedown of Left Internal Mammary Artery, Endovein [place] of Left Greater Saphenous Vein, Coronary Artery Bypass Grafting x3, On Cardiopulmonary Bypass, Transesophageal Echocardiogram per Anesthesia, Blackwell Catheter Insertion per Urology;  Surgeon: Jose Gibbons MD;  Location: [facility] OR     CV INTRA-AORTIC BALLOON PUMP INSERTION N/A 1/21/2021    Procedure: CV INTRA-AORTIC BALLOON PUMP INSERTION;  Surgeon: Dustin Nuñez MD;  Location:  HEART CARDIAC CATH LAB     CV SWAN RENETTA PROCEDURE N/A 1/21/2021    Procedure: [place] Renetta Procedure;  Surgeon: Dustin Nuñez MD;  Location:  HEART CARDIAC CATH LAB       Social History   I have reviewed this patient's social history and updated it with pertinent information if needed.  Social History     Tobacco Use     Smoking status: Current Every Day Smoker     Packs/day: 1.00     Years: 25.00     Pack years: 25.00     Types: Cigarettes     Smokeless tobacco: Never Used     Tobacco comment: Now down to 1/2 PPD smoking   Substance Use Topics     Alcohol use: Yes     Comment: 6-7 mixed drinks nightly     Drug use: Never       Family History   I have reviewed this patient's family history and updated it with pertinent information if needed.  Family History   Problem Relation Age of Onset     Brain Tumor Mother      Heart Disease Other      Diabetes Other        Medications   Medications Prior to Admission   Medication Sig Dispense Refill Last Dose     acetaminophen (TYLENOL) 325 MG tablet Take 3 tablets (975 mg) by mouth 3 times daily   2/1/2021 at 0745     amiodarone (PACERONE) 200 MG tablet Take 400 mg PO daily until Feb 8th, then take 200 mg PO daily (end date 3/29/21)   2/1/2021 at 0746     aspirin 81 MG EC tablet Take 81 mg by mouth every  morning    2/1/2021 at 0746     atorvastatin (LIPITOR) 40 MG tablet Take 40 mg by mouth every morning    2/1/2021 at 0748     folic acid (FOLVITE) 1 MG tablet Take 1 tablet (1 mg) by mouth daily for 13 days   2/1/2021 at 0747     furosemide (LASIX) 40 MG tablet Take 1 tablet (40 mg) by mouth 2 times daily   2/1/2021 at 0748     HYDROmorphone (DILAUDID) 2 MG tablet Take 1-2 tablets (2-4 mg) by mouth every 4 hours as needed for moderate to severe pain  0 2/1/2021 at 1149     insulin aspart (NOVOLOG PEN) 100 UNIT/ML pen Inject 1-12 Units Subcutaneous 4 times daily (with meals and nightly) -164 =1 units.   -189 =2.   -214 =3.   -239 =4.   -264 =5.   -289 =6.   -314 =7.   -339 =8.   -364 =9.  BG greater than or equal to 365 give 10 units  To be given with meal insulin, based on pre-meal BG   2/1/2021 at 1154     insulin glargine (LANTUS PEN) 100 UNIT/ML pen Inject 12 Units Subcutaneous every morning (before breakfast)   2/1/2021 at 0752     lisinopril (ZESTRIL) 5 MG tablet Take 1 tablet (5 mg) by mouth daily   2/1/2021 at 0746     metFORMIN (GLUCOPHAGE) 500 MG tablet Take 500 mg by mouth daily (with breakfast)   2/1/2021 at 0937     methocarbamol (ROBAXIN) 500 MG tablet Take 1 tablet (500 mg) by mouth 4 times daily   2/1/2021 at 1149     pantoprazole (PROTONIX) 40 MG EC tablet Take 1 tablet (40 mg) by mouth every morning (before breakfast) for 14 days   2/1/2021 at 0748     potassium chloride ER (KLOR-CON M) 20 MEQ CR tablet Take 1 tablet (20 mEq) by mouth 2 times daily while on Lasix   2/1/2021 at 0745     thiamine (B-1) 100 MG tablet Take 1 tablet (100 mg) by mouth daily for 14 days   2/1/2021 at 0747     warfarin ANTICOAGULANT (COUMADIN) 2 MG tablet Take 2 mg PO daily at 6 pm until next INR check, then dose per INR goal 2-3 for atrial fibrillation   1/31/2021 at 1750     albuterol (PROAIR HFA/PROVENTIL HFA/VENTOLIN HFA) 108 (90 Base) MCG/ACT inhaler Inhale 2 puffs  into the lungs every 6 hours as needed for wheezing or shortness of breath / dyspnea   Unknown at Unknown time     BETA BLOCKER NOT PRESCRIBED (INTENTIONAL) S/P CAB with EF 30-35%. Can restart when tolerated.   Unknown at Unknown time     melatonin 1 MG TABS tablet Take 1 tablet (1 mg) by mouth nightly as needed for sleep   1/28/2021 at 2159     multivitamin w/minerals (THERA-VIT-M) tablet Take 1 tablet by mouth daily   02/01/2021 at 0747     nitroGLYcerin (NITROSTAT) 0.4 MG sublingual tablet Place 0.4 mg under the tongue every 5 minutes as needed for chest pain For chest pain place 1 tablet under the tongue every 5 minutes for 3 doses. If symptoms persist 5 minutes after 1st dose call 911.   Unknown at Unknown time     polyethylene glycol (MIRALAX) 17 GM/Dose powder Take 17 g by mouth daily as needed for constipation 510 g  02/01/2021 at 0749     senna-docusate (SENOKOT-S/PERICOLACE) 8.6-50 MG tablet Take 1 tablet by mouth daily hold for loose stools   Unknown at Unknown time       Allergies   No Known Allergies    Physical Exam   Vital Signs: Temp: 96.6  F (35.9  C) Temp src: Oral BP: 123/51 Pulse: 69   Resp: 24 SpO2: 97 % O2 Device: None (Room air)    Weight: 190 lbs 0 oz    General: Awake. Sitting up in bed. NAD.  HEENT: NC/AT. Anicteric sclera. MMM.  CV: RRR. Sternal incision c/d/i.  Respiratory: Normal effort on RA. Lungs CTA anterolaterally.  GI: Abdomen is soft, non-tender, and non-distended. Bowel sounds present.  Extremities: Trace BLE edema. Warm and well perfused.  Neuro: A&O x 3. Moves all extremities.  Skin: No rashes or jaundice on exposed areas. Dressing over old chest tube sites.    Data   Results for orders placed or performed during the hospital encounter of 02/01/21 (from the past 24 hour(s))   Glucose by meter   Result Value Ref Range    Glucose 119 (H) 70 - 99 mg/dL   Glucose by meter   Result Value Ref Range    Glucose 192 (H) 70 - 99 mg/dL   Glucose by meter   Result Value Ref Range     Glucose 152 (H) 70 - 99 mg/dL   INR   Result Value Ref Range    INR 2.42 (H) 0.86 - 1.14   Basic metabolic panel   Result Value Ref Range    Sodium 138 133 - 144 mmol/L    Potassium 4.3 3.4 - 5.3 mmol/L    Chloride 101 94 - 109 mmol/L    Carbon Dioxide 31 20 - 32 mmol/L    Anion Gap 6 3 - 14 mmol/L    Glucose 114 (H) 70 - 99 mg/dL    Urea Nitrogen 9 7 - 30 mg/dL    Creatinine 0.67 0.66 - 1.25 mg/dL    GFR Estimate >90 >60 mL/min/[1.73_m2]    GFR Estimate If Black >90 >60 mL/min/[1.73_m2]    Calcium 8.8 8.5 - 10.1 mg/dL   CBC with platelets differential   Result Value Ref Range    WBC 11.3 (H) 4.0 - 11.0 10e9/L    RBC Count 3.32 (L) 4.4 - 5.9 10e12/L    Hemoglobin 10.4 (L) 13.3 - 17.7 g/dL    Hematocrit 31.8 (L) 40.0 - 53.0 %    MCV 96 78 - 100 fl    MCH 31.3 26.5 - 33.0 pg    MCHC 32.7 31.5 - 36.5 g/dL    RDW 12.6 10.0 - 15.0 %    Platelet Count 342 150 - 450 10e9/L    Diff Method Automated Method     % Neutrophils 65.8 %    % Lymphocytes 21.6 %    % Monocytes 7.1 %    % Eosinophils 3.0 %    % Basophils 0.5 %    % Immature Granulocytes 2.0 %    Nucleated RBCs 0 0 /100    Absolute Neutrophil 7.4 1.6 - 8.3 10e9/L    Absolute Lymphocytes 2.4 0.8 - 5.3 10e9/L    Absolute Monocytes 0.8 0.0 - 1.3 10e9/L    Absolute Eosinophils 0.3 0.0 - 0.7 10e9/L    Absolute Basophils 0.1 0.0 - 0.2 10e9/L    Abs Immature Granulocytes 0.2 0 - 0.4 10e9/L    Absolute Nucleated RBC 0.0    Glucose by meter   Result Value Ref Range    Glucose 112 (H) 70 - 99 mg/dL   Glucose by meter   Result Value Ref Range    Glucose 140 (H) 70 - 99 mg/dL

## 2021-02-03 ENCOUNTER — APPOINTMENT (OUTPATIENT)
Dept: OCCUPATIONAL THERAPY | Facility: CLINIC | Age: 51
End: 2021-02-03
Payer: COMMERCIAL

## 2021-02-03 ENCOUNTER — APPOINTMENT (OUTPATIENT)
Dept: PHYSICAL THERAPY | Facility: CLINIC | Age: 51
End: 2021-02-03
Payer: COMMERCIAL

## 2021-02-03 LAB
GLUCOSE BLDC GLUCOMTR-MCNC: 119 MG/DL (ref 70–99)
GLUCOSE BLDC GLUCOMTR-MCNC: 124 MG/DL (ref 70–99)
GLUCOSE BLDC GLUCOMTR-MCNC: 131 MG/DL (ref 70–99)
GLUCOSE BLDC GLUCOMTR-MCNC: 149 MG/DL (ref 70–99)
GLUCOSE BLDC GLUCOMTR-MCNC: 157 MG/DL (ref 70–99)
INR PPP: 1.96 (ref 0.86–1.14)

## 2021-02-03 PROCEDURE — 97535 SELF CARE MNGMENT TRAINING: CPT | Mod: GO | Performed by: OCCUPATIONAL THERAPIST

## 2021-02-03 PROCEDURE — 99233 SBSQ HOSP IP/OBS HIGH 50: CPT | Performed by: PHYSICIAN ASSISTANT

## 2021-02-03 PROCEDURE — 97110 THERAPEUTIC EXERCISES: CPT | Mod: GO | Performed by: OCCUPATIONAL THERAPIST

## 2021-02-03 PROCEDURE — 128N000003 HC R&B REHAB

## 2021-02-03 PROCEDURE — 250N000013 HC RX MED GY IP 250 OP 250 PS 637: Performed by: PHYSICAL MEDICINE & REHABILITATION

## 2021-02-03 PROCEDURE — 99232 SBSQ HOSP IP/OBS MODERATE 35: CPT | Performed by: PHYSICAL MEDICINE & REHABILITATION

## 2021-02-03 PROCEDURE — 250N000013 HC RX MED GY IP 250 OP 250 PS 637: Performed by: PHYSICIAN ASSISTANT

## 2021-02-03 PROCEDURE — 999N001017 HC STATISTIC GLUCOSE BY METER IP

## 2021-02-03 PROCEDURE — 85610 PROTHROMBIN TIME: CPT | Performed by: PHYSICIAN ASSISTANT

## 2021-02-03 PROCEDURE — 97110 THERAPEUTIC EXERCISES: CPT | Mod: GP | Performed by: PHYSICAL THERAPIST

## 2021-02-03 PROCEDURE — 36415 COLL VENOUS BLD VENIPUNCTURE: CPT | Performed by: PHYSICIAN ASSISTANT

## 2021-02-03 RX ORDER — FOLIC ACID 1 MG/1
1 TABLET ORAL DAILY
Qty: 30 TABLET | Refills: 0 | Status: SHIPPED | OUTPATIENT
Start: 2021-02-03 | End: 2021-03-05

## 2021-02-03 RX ORDER — LISINOPRIL 5 MG/1
5 TABLET ORAL DAILY
Qty: 30 TABLET | Refills: 0 | Status: SHIPPED | OUTPATIENT
Start: 2021-02-03 | End: 2021-03-05

## 2021-02-03 RX ORDER — AMIODARONE HYDROCHLORIDE 400 MG/1
400 TABLET ORAL DAILY
Qty: 4 TABLET | Refills: 0 | Status: SHIPPED | OUTPATIENT
Start: 2021-02-04 | End: 2021-02-08

## 2021-02-03 RX ORDER — ATORVASTATIN CALCIUM 40 MG/1
40 TABLET, FILM COATED ORAL EVERY MORNING
Qty: 30 TABLET | Refills: 0 | Status: SHIPPED | OUTPATIENT
Start: 2021-02-03 | End: 2021-03-05

## 2021-02-03 RX ORDER — MULTIPLE VITAMINS W/ MINERALS TAB 9MG-400MCG
1 TAB ORAL DAILY
Qty: 30 TABLET | Refills: 0 | Status: SHIPPED | OUTPATIENT
Start: 2021-02-03 | End: 2021-03-05

## 2021-02-03 RX ORDER — ASPIRIN 81 MG/1
81 TABLET ORAL EVERY MORNING
Qty: 30 TABLET | Refills: 0 | Status: SHIPPED | OUTPATIENT
Start: 2021-02-03 | End: 2021-03-05

## 2021-02-03 RX ORDER — POTASSIUM CHLORIDE 750 MG/1
20 TABLET, EXTENDED RELEASE ORAL DAILY
Status: DISCONTINUED | OUTPATIENT
Start: 2021-02-04 | End: 2021-02-05 | Stop reason: HOSPADM

## 2021-02-03 RX ORDER — PANTOPRAZOLE SODIUM 40 MG/1
40 TABLET, DELAYED RELEASE ORAL
Qty: 30 TABLET | Refills: 0 | Status: SHIPPED | OUTPATIENT
Start: 2021-02-03 | End: 2021-03-05

## 2021-02-03 RX ORDER — WARFARIN SODIUM 3 MG/1
3 TABLET ORAL
Status: COMPLETED | OUTPATIENT
Start: 2021-02-03 | End: 2021-02-03

## 2021-02-03 RX ORDER — LIDOCAINE 4 G/G
1-3 PATCH TOPICAL EVERY 24 HOURS
Qty: 15 PATCH | Refills: 0 | Status: SHIPPED | OUTPATIENT
Start: 2021-02-03 | End: 2021-02-08

## 2021-02-03 RX ORDER — LANOLIN ALCOHOL/MO/W.PET/CERES
100 CREAM (GRAM) TOPICAL DAILY
Qty: 30 TABLET | Refills: 0 | Status: SHIPPED | OUTPATIENT
Start: 2021-02-03 | End: 2021-03-05

## 2021-02-03 RX ORDER — AMIODARONE HYDROCHLORIDE 200 MG/1
200 TABLET ORAL DAILY
Qty: 30 TABLET | Refills: 0 | Status: SHIPPED | OUTPATIENT
Start: 2021-02-09 | End: 2021-03-11

## 2021-02-03 RX ORDER — FUROSEMIDE 20 MG
40 TABLET ORAL DAILY
Status: DISCONTINUED | OUTPATIENT
Start: 2021-02-04 | End: 2021-02-05 | Stop reason: HOSPADM

## 2021-02-03 RX ORDER — FUROSEMIDE 40 MG
40 TABLET ORAL DAILY
Qty: 30 TABLET | Refills: 0 | Status: SHIPPED | OUTPATIENT
Start: 2021-02-03 | End: 2021-03-05

## 2021-02-03 RX ORDER — NICOTINE POLACRILEX 4 MG
15-30 LOZENGE BUCCAL
Qty: 37.5 ML | Refills: 0 | Status: SHIPPED | OUTPATIENT
Start: 2021-02-03 | End: 2021-02-04

## 2021-02-03 RX ADMIN — MULTIPLE VITAMINS W/ MINERALS TAB 1 TABLET: TAB at 07:59

## 2021-02-03 RX ADMIN — THIAMINE HCL TAB 100 MG 100 MG: 100 TAB at 08:00

## 2021-02-03 RX ADMIN — FOLIC ACID 1 MG: 1 TABLET ORAL at 08:00

## 2021-02-03 RX ADMIN — FUROSEMIDE 40 MG: 20 TABLET ORAL at 07:59

## 2021-02-03 RX ADMIN — LIDOCAINE 1 PATCH: 560 PATCH PERCUTANEOUS; TOPICAL; TRANSDERMAL at 13:39

## 2021-02-03 RX ADMIN — LISINOPRIL 5 MG: 2.5 TABLET ORAL at 07:59

## 2021-02-03 RX ADMIN — AMIODARONE HYDROCHLORIDE 400 MG: 200 TABLET ORAL at 07:58

## 2021-02-03 RX ADMIN — POTASSIUM CHLORIDE 20 MEQ: 750 TABLET, EXTENDED RELEASE ORAL at 08:00

## 2021-02-03 RX ADMIN — PANTOPRAZOLE SODIUM 40 MG: 40 TABLET, DELAYED RELEASE ORAL at 06:14

## 2021-02-03 RX ADMIN — WARFARIN SODIUM 3 MG: 3 TABLET ORAL at 17:44

## 2021-02-03 RX ADMIN — INSULIN GLARGINE 12 UNITS: 100 INJECTION, SOLUTION SUBCUTANEOUS at 07:57

## 2021-02-03 RX ADMIN — METFORMIN HYDROCHLORIDE 500 MG: 500 TABLET ORAL at 07:59

## 2021-02-03 RX ADMIN — ATORVASTATIN CALCIUM 40 MG: 40 TABLET, FILM COATED ORAL at 08:00

## 2021-02-03 RX ADMIN — ASPIRIN 81 MG: 81 TABLET, COATED ORAL at 08:02

## 2021-02-03 RX ADMIN — ACETAMINOPHEN 975 MG: 325 TABLET, FILM COATED ORAL at 07:59

## 2021-02-03 RX ADMIN — ACETAMINOPHEN 975 MG: 325 TABLET, FILM COATED ORAL at 19:45

## 2021-02-03 RX ADMIN — ACETAMINOPHEN 975 MG: 325 TABLET, FILM COATED ORAL at 13:44

## 2021-02-03 ASSESSMENT — MIFFLIN-ST. JEOR: SCORE: 1703.15

## 2021-02-03 NOTE — PROGRESS NOTES
"  [facility]   Acute Rehabilitation Unit  Daily progress note    INTERVAL HISTORY  Jaxon Melendez was seen and examined in his room. He was doing well. Didn't sleep ok last night; no pain or other issues, just couldn't get comfortable. No new symptoms today. Working well with therapies. Discussed the plan for neuropsych testing as outpatient and he was agreeable.     He has not had any methocarbamol since yesterday and had only one dose if dilaudid; may not even need these for home. Hospitalist team dropped lasix dose to daily.     Will plan for Friday with outpatient therapies. Will need supervision with all iADLs due to cognitive deficits. Nursing will also coordinate education for wound care, anticoagulation, and DM management.       MEDICATIONS    acetaminophen  975 mg Oral TID     [START ON 2/8/2021] amiodarone  200 mg Oral Daily     amiodarone  400 mg Oral Daily     aspirin  81 mg Oral QAM     atorvastatin  40 mg Oral QAM     folic acid  1 mg Oral Daily     furosemide  40 mg Oral BID     insulin glargine  12 Units Subcutaneous QAM AC     lidocaine  1-3 patch Transdermal Q24H     lidocaine   Transdermal Q8H     lisinopril  5 mg Oral Daily     metFORMIN  500 mg Oral Daily with breakfast     multivitamin w/minerals  1 tablet Oral Daily     pantoprazole  40 mg Oral QAM AC     potassium chloride ER  20 mEq Oral BID     thiamine  100 mg Oral Daily     warfarin ANTICOAGULANT  3 mg Oral ONCE at 18:00        albuterol, glucose **OR** dextrose **OR** glucagon, HYDROmorphone, methocarbamol, naloxone **OR** naloxone **OR** naloxone **OR** naloxone, nitroGLYcerin, - MEDICATION INSTRUCTIONS -, polyethylene glycol, senna-docusate, Warfarin Therapy Reminder     PHYSICAL EXAM  /53 (BP Location: Right arm)   Pulse 66   Temp 97.8  F (36.6  C) (Oral)   Resp 20   Ht 1.778 m (5' 10\")   Wt 83.2 kg (183 lb 6.4 oz)   SpO2 95%   BMI 26.32 kg/m      Gen: NAD, resting in bed  Cardio: " regular pulse   Pulm: non-labored in room air - wounds were not examined today   Abd: soft, non-tender  Ext: no edema in bilateral lower extremities    Neuro/MSK: alert and oriented, no focal weakness other than L hand (FE), not able to remember previous discussions and his meds    LABS  CBC RESULTS:   Recent Labs   Lab Test 02/02/21  0519   WBC 11.3*   RBC 3.32*   HGB 10.4*   HCT 31.8*   MCV 96   MCH 31.3   MCHC 32.7   RDW 12.6         Last Comprehensive Metabolic Panel:  Sodium   Date Value Ref Range Status   02/02/2021 138 133 - 144 mmol/L Final     Potassium   Date Value Ref Range Status   02/02/2021 4.3 3.4 - 5.3 mmol/L Final     Chloride   Date Value Ref Range Status   02/02/2021 101 94 - 109 mmol/L Final     Carbon Dioxide   Date Value Ref Range Status   02/02/2021 31 20 - 32 mmol/L Final     Anion Gap   Date Value Ref Range Status   02/02/2021 6 3 - 14 mmol/L Final     Glucose   Date Value Ref Range Status   02/02/2021 114 (H) 70 - 99 mg/dL Final     Urea Nitrogen   Date Value Ref Range Status   02/02/2021 9 7 - 30 mg/dL Final     Creatinine   Date Value Ref Range Status   02/02/2021 0.67 0.66 - 1.25 mg/dL Final     GFR Estimate   Date Value Ref Range Status   02/02/2021 >90 >60 mL/min/[1.73_m2] Final     Comment:     Non  GFR Calc  Starting 12/18/2018, serum creatinine based estimated GFR (eGFR) will be   calculated using the Chronic Kidney Disease Epidemiology Collaboration   (CKD-EPI) equation.       Calcium   Date Value Ref Range Status   02/02/2021 8.8 8.5 - 10.1 mg/dL Final     Bilirubin Total   Date Value Ref Range Status   01/23/2021 0.9 0.2 - 1.3 mg/dL Final     Alkaline Phosphatase   Date Value Ref Range Status   01/23/2021 62 40 - 150 U/L Final     ALT   Date Value Ref Range Status   01/23/2021 10 0 - 70 U/L Final     AST   Date Value Ref Range Status   01/23/2021 21 0 - 45 U/L Final             ASSESSMENT / PLAN    Jaxon SAPNA Melendez is a 50 year old male with past medical  history of hyperlipidemia, hypertension, DM 2, hidradenitis suppurativa in the presacral region, tobacco use disorder, alcohol use disorder, ischemic cardiomyopathy with ejection fraction of 10 to 20%, CAD s/p PCI to RCA and LCx in 2010, was [facility] to [facility]21 for retrosternal chest pain and underwent CABG x3 for multivessel disease(left internal mammary artery, left anterior descending artery, saphenous vein graft to first diagonal artery, saphenous vein graft to obtuse marginal artery) on 1/22/2021. Admission to acute inpatient rehab: February 1, 2021.        Rehabilitation - continue comprehensive acute inpatient rehabilitation program with multidisciplinary approach including therapies, rehab nursing, and physiatry following. See interval history for updates.      Medical Conditions  #S/p CABG x3  #Coronary artery disease  #Ischemic cardiomyopathy with low ejection fraction of 30-35%  Patient has rapid wide-complex tachycardia with cardioversion x3 on 1/25/2021 and amiodarone bolus was given.  EP was consulted and recommended amiodarone for 8 weeks.  Amiodarone load given on 1/27/2021 and transition 400 mg daily due to heart rate is in 50s on 1/29/2021.  Warfarin bridged with heparin infusion.  Echocardiogram done on 1/29/2021 showed ejection fraction of 30 to 25%, no LifeVest per EP.  Will follow with outpatient EP with echo in 3 months.  -Daily weights  -Sternal precautions, not lifting weight more than 10 pounds for 6 weeks.  -Continue amiodarone 400 mg daily and then 200 mg daily(starting 2/8/2021)  -Aspirin 81 mg daily  -Continue warfarin daily dosed by pharmacy.  -Hold beta-blockers as patient is bradycardic.  -Furosemide 40 mg twice daily. 02/03/21 was changed to daily per hospitalist team   -NTG 0.4 mg ever 5 min prn for chest pain.     #Postoperative pain management  Patient was continued on scheduled Tylenol and Robaxin with Dilaudid 4 mg every 3 hours.  As patient has history of alcohol  abuse he may require higher amount of medications for pain management.  -Continue scheduled Tylenol, Robaxin  -Continue as needed dilaudid 2-4 mg q4 hours  -Lidocaine patches q shift.  02/03/21 He has not had any methocarbamol since yesterday and had only one dose if dilaudid; may not even need these for home. Hospitalist team dropped lasix dose to daily.      #Left hand weakness  Noted that he had temporary paresthesia in fingers b/l after surgery which resolved. Has noted some weakness in his left hand since last night 1/31 with no paresthesia or numbness. Exam showed weakness with finger ext at 2/5, WE was 4-/5, and other radially innervated muscles were also relatively weak but as significant as EDC. Suspect PIN neuropathy with no known injury at this point.   -Will monitor and discuss with the therapy team regarding ROM and strengthening exercises   -Will need NCS/EMG as outpatient if persistent .     #Acute postop respiratory failure(resolving)  #Aspiration pneumonia?  Patient was reintubated postop.  On 1/21/2021 and extubated on 1/26/2021 and improved with diuresis.  Patient infective work-up was negative for the UA, Gram stain negative for NG tube cultures.  Was started on cefepime and vancomycin (1/25-1/28/2021).  -Albuterol nebs as needed.  -We will continue to monitor.     #Diabetes mellitus  -Continue Lantus 12 units on 1/29/2021(PTA dose of 25 units)  -Metformin 500 mg daily with breakfast.  -Continue high dose sliding scale     #Hypertension  -Continue lisinopril 5 mg daily(started on 1/30/2021)     #Hyperlipidemia  -Continue atorvastatin 40 mg daily     #Alcohol use disorder  #Tobacco use disorder  -Education for alcohol and tobacco cessation  -Continue thiamine/folate.  -Continue gabapentin as above.     #Hypospadias  -Blackwell discontinued on 1/27/2021, voiding independently.     #Hidradenitis suppurativa in the presacral region  -Wound nurse consult done on 2/1/2021, appreciate recs  -As per wound  nurse consult note.  Sacral and buttock wounds: Every 3 days     Cleanse the area with Microklenz and pat dry.    Apply No sting film barrier to periwound skin.    Cover wound with Mepilex. Sacral Mepilex (#[id number])     Change dressing Q 3 days or more frequently for drainage.    If Mepilex requiring changing more than once a shift ok to switch to cleanse BID and apply gauze dressings where able.      1. Adjustment to disability:  Clinical psychology to eval and treat as indicated  2. FEN: regular diet, thin liquids  3. Bowel: continent  4. Bladder: continent  5. DVT Prophylaxis: warfarin and aspirin  6. GI Prophylaxis: pantoprazole 40 mg daily  7. Code: Full   8. Disposition: Home   9. ELOS: 5-7 days  10. Rehab prognosis:  Good  11. Follow up Appointments on Discharge: EP(cardiology) in 3 months with ECHO, PCP in 1 week.       Eleanor Marion MD  Physical Medicine & Rehabilitation     Time Spent on this Encounter   I spent a total of 30 minutes face to face and coordinating care of Jaxon Melendez.  Over 50% of my time on the unit was spent counseling the patient and /or coordinating care; see note for details.

## 2021-02-03 NOTE — PLAN OF CARE
Denies pain, voided spontaneously on the toilet. Chest incisions cdi  and approximated. Dressing to buttocks changed, scant amount of yellow drainage noted, no foul odor noted. Scant amount of drainage noted on scrotum wound, area cleansed with wound cleanser abd barrier cream applied. Ate good at breakfast and lunch, Bg's today 131 149, will continue POC

## 2021-02-03 NOTE — DISCHARGE SUMMARY
[facility]   Acute Rehabilitation Unit  Discharge summary     Date of Admission: 2/1/2021  Date of Discharge: 2/5/2021  Disposition: Home  Primary Care Physician: No Ref-Primary, Physician  Attending physician: Eleanor Marion MD  Other significant physician provider(s): Jose Gibbons MD (Cardiothoracic Surgery)      DISCHARGE DIAGNOSIS      CABG times 3 (1/22/2021) Impairment group code: 0.09    DM, HTN, HLD, Alcohol/Tobbacco use disorder      BRIEF SUMMARY from [facility] [place]  Jaxon Melendez is a 50 year old male with past medical history of hyperlipidemia, hypertension, DM 2, hidradenitis suppurativa in the presacral region, tobacco use disorder, alcohol use disorder, ischemic cardiomyopathy with ejection fraction of 10 to 20%, CAD s/p PCI to RCA and LCx in 2010. He started having left sided chest pain while working in the cold, patient had previous episodes of chest pain in the past, so he came to the hospital and was admitted to [facility] on 1/21/2021 for retrosternal chest pain exacerbated by exertion and relieved by rest.  Troponins were elevated with 25-69.  He was treated for coronary artery syndrome, was taken to Cath Lab 1/22/2021 and was found to have multiple vessel disease including in-stent restenosis and received an IABP 1/22/2021 and was deemed appropriate for CABG.  Patient went through CABG x3 with intraoperative cardioversion x1.  Postop course patient had respiratory instability and was transferred to ICU on third postop day he developed atrial flutter with RVR, required intubation and cardioversion x3 on 1/25/2021.  He was started on IV amiodarone and required hemodynamic support.  Patient was extubated on 1/26/2021 and had intermittent A. fib post extubation.  He was started on heparin drip and maintained sinus bradycardia in 50s and was transferred to postsurgical telemetry on 1/20/2021.  He was weaned off of oxygen, continued amiodarone, EP saw and recommended 400  mg p.o. amiodarone daily due to bradycardia is in the low 60s.  Patient difficulty with pain management postop.  And was not scheduled Tylenol, Robaxin and p.o. Dilaudid.  Patient was discharged to acute rehab floor on 2/1/2021.     During his stay at acute rehab unit, patient was medically stable and progressed well in his functional status.    REHABILITATION COURSE  Jaxon Melendez was admitted to the [facility] acute inpatient rehabilitation unit on 2/1/2021. During admission, he participated in PT, OT, and speech therapies for 3 hrs/day. During his stay he had no major medical complications    As per PT:  Bed Mobility: IND from elevated bed, follows sternal precuations  Transfer: FWW and SBA   Gait: ~200ft with FWW and SBA, 2x50ft without AD and CGA   Stairs: 12 stairs, reciprocal step pattern, 1 rail, SBA   Balance: intact sitting and standing without UE support      OP cardiac rehab to safely progress functional CV endurance..  Pt is discharging IND with mobility, good sternal precaution adherence. 27/30 on FGA. Safe discharge plan to discharge home with daughter's support.      As per OT:  ADLs: Pt. Completed full shower in standing without assist. Pt. Sat to complete drying self for safety.  Pt. Had no LOB and demonstrated safety with task.   IADLs: Pt previously independent but reporting difficulty managing meds at home.  Recommend supervision for IADL initially at discharge.  Vision/Cognition: Pt with baseline memory deficits, reports he utilizes writing down information and having daughter keep track of things.  Pt scoring 6/28 on the short blessed test today where scores 5-9 indicate questionable impairment.  Pt would likely benefit from cognitive/neuropsych assessment to establish baseline and support safety with complex IADL including driving.      Pt is discharging with 24/7 due to cognitive deficits that may be baseline but that need to be looked into further since pt was not managing his meds safely.  Pt is going to [facility] home where he will have supervision for IADL and assist with higher level IADL until further notice. Recommend continued therapy for higher level cognition as well as R hand deficit in finger extension .          MEDICAL COURSE  #S/p CABG x3  #Coronary artery disease  #Ischemic cardiomyopathy with low ejection fraction of 30-35%  Patient has rapid wide-complex tachycardia with cardioversion x3 on 1/25/2021 and amiodarone bolus was given.  EP was consulted and recommended amiodarone for 8 weeks.  Amiodarone load given on 1/27/2021 and transition 400 mg daily due to heart rate is in 50s on 1/29/2021.  Warfarin bridged with heparin infusion.  Echocardiogram done on 1/29/2021 showed ejection fraction of 30 to 25%, no LifeVest per EP.  Will follow with outpatient EP with echo in 3 months.  -Daily weights  -Sternal precautions, not lifting weight more than 10 pounds for 6 weeks.  -Continue amiodarone 400 mg daily and then 200 mg daily(starting 2/8/2021)  -Aspirin 81 mg daily  -Continue warfarin daily dosed pharmacy while in inpatient. Continue 3 mg daily at discharge and check INR on 2/8/2021.  -Hold beta-blockers as patient is bradycardic.  -Furosemide 40 mg twice daily. 02/03/21 was changed to daily per hospitalist team   -KCl 20 mEq every day  - Lisinopril 5 mg daily.  -NTG 0.4 mg ever 5 min prn for chest pain  -Daily weights  - Repeat Echo in 3 months     #Postoperative pain management  Patient was continued on scheduled Tylenol and Robaxin with Dilaudid 4 mg every 3 hours.  As patient has history of alcohol abuse he may require higher amount of medications for pain management.  -Continue scheduled Tylenol, Robaxin  -Continue as needed dilaudid 2-4 mg q4 hours  -Lidocaine patches q shift.  02/03/21 He has not had any methocarbamol since yesterday and had only one dose if dilaudid; may not even need these for home. Discontinued at discharge     #Left hand weakness  Noted that he had temporary  paresthesia in fingers b/l after surgery which resolved. Has noted some weakness in his left hand since last night 1/31 with no paresthesia or numbness. Exam showed weakness with finger ext at 2/5, WE was 4-/5, and other radially innervated muscles were also relatively weak but as significant as EDC. Suspect PIN neuropathy with no known injury at this point.   -Will monitor and discuss with the therapy team regarding ROM and strengthening exercises   -Will need NCS/EMG as outpatient if persistent .     #Swelling in the right groin region  #Leukocytosis  Patient started having swelling 5 to 5 cm in the right groin region, patient does not recall that if it is increased in size or not. Swelling is tender and he has a white count of 15, proCal <0.05, repeat CBC with WBC count of 14.2 on 2/5/2021. Digitally expressed out 5 ml pus mixed blood on 2/5/2021  -As per Hospitalist team recs: Testicular US with complex mixed echogenicity collection in the lateral R scrotum most c/w abscess; no evidence of epididymoorchitis. Started Doxycycline 100 mg BID x 5 days. Follow up with PCP within 1 week for re-evaluation. PCP to consider Urology referral.  -Benzyl Peroxide 5% wash and Clindamycin lotion PRN to active lesions.  - Dermatology will arrange virtual visit with patient within 1 month of discharge for ongoing management of hidradenitis.    #Acute postop respiratory failure(resolving)  #Aspiration pneumonia?  Patient was reintubated postop.  On 1/21/2021 and extubated on 1/26/2021 and improved with diuresis.  Patient infective work-up was negative for the UA, Gram stain negative for NG tube cultures.  Was started on cefepime and vancomycin (1/25-1/28/2021).  -Albuterol nebs as needed.    #Diabetes mellitus  -Continue Lantus 12 units on 1/29/2021(PTA dose of 25 units)  -Metformin 500 mg daily with breakfast.  -Continue high dose sliding scale; discontinued at discharge   - Diabetes education prior to  discharge     #Hypertension  -Continue lisinopril 5 mg daily(started on 1/30/2021)     #Hyperlipidemia  -Continue atorvastatin 40 mg daily     #Alcohol use disorder  #Tobacco use disorder  -Education for alcohol and tobacco cessation  -Continue thiamine/folate.  -Continue gabapentin as above.     #Hypospadias  -Blackwell discontinued on 1/27/2021, voiding independently.     #Hidradenitis suppurativa in the presacral region  -Wound nurse consult done on 2/1/2021, appreciate recs  -As per wound nurse consult note.  Sacral and buttock wounds: Every 3 days     Cleanse the area with Microklenz and pat dry.    Apply No sting film barrier to periwound skin.    Cover wound with Mepilex. Sacral Mepilex (#[id number])     Change dressing Q 3 days or more frequently for drainage.    If Mepilex requiring changing more than once a shift ok to switch to cleanse BID and apply gauze dressings where able.      - Dermatology will arrange virtual visit with patient within 1 month of discharge for ongoing management of hidradenitis.    #Concern for Impaired Cognition - A&O x 3 with short term memory deficits. Poor recall of conversations and medical conditions. Suspect multifactorial r/t alcohol abuse, recent surgery and hospitalization, opiate use.  - Outpatient Neuropsych testing      DISCHARGE MEDICATIONS  Current Discharge Medication List      START taking these medications    Details   benzoyl peroxide 5 % external liquid Wash the groin areas and any other raised area daily  Qty: 148 mL, Refills: 0    Associated Diagnoses: H/O three vessel coronary artery bypass      clindamycin (CLEOCIN T) 1 % external lotion Apply topically 2 times daily for 10 days  Qty: 60 mL, Refills: 0    Associated Diagnoses: H/O three vessel coronary artery bypass      doxycycline hyclate (VIBRAMYCIN) 100 MG capsule Take 1 capsule (100 mg) by mouth every 12 hours for 10 doses  Qty: 10 capsule, Refills: 0    Associated Diagnoses: H/O three vessel coronary artery  bypass      Lidocaine (LIDOCARE) 4 % Patch Place 1-3 patches onto the skin every 24 hours for 5 days To prevent lidocaine toxicity, patient should be patch free for 12 hrs daily.  Qty: 15 patch, Refills: 0    Associated Diagnoses: H/O three vessel coronary artery bypass         CONTINUE these medications which have CHANGED    Details   acetaminophen (TYLENOL) 325 MG tablet Take 2 tablets (650 mg) by mouth every 4 hours as needed for mild pain    Associated Diagnoses: S/P CABG (coronary artery bypass graft)      !! amiodarone (PACERONE) 200 MG tablet Take 1 tablet (200 mg) by mouth daily  Qty: 30 tablet, Refills: 0    Associated Diagnoses: H/O three vessel coronary artery bypass      !! amiodarone (PACERONE) 400 MG tablet Take 1 tablet (400 mg) by mouth daily for 4 days  Qty: 4 tablet, Refills: 0    Associated Diagnoses: H/O three vessel coronary artery bypass      aspirin 81 MG EC tablet Take 1 tablet (81 mg) by mouth every morning  Qty: 30 tablet, Refills: 0    Associated Diagnoses: H/O three vessel coronary artery bypass      atorvastatin (LIPITOR) 40 MG tablet Take 1 tablet (40 mg) by mouth every morning  Qty: 30 tablet, Refills: 0    Associated Diagnoses: H/O three vessel coronary artery bypass      folic acid (FOLVITE) 1 MG tablet Take 1 tablet (1 mg) by mouth daily  Qty: 30 tablet, Refills: 0    Associated Diagnoses: S/P CABG (coronary artery bypass graft)      !! furosemide (LASIX) 40 MG tablet Take 1 tablet (40 mg) by mouth daily  Qty: 30 tablet, Refills: 0    Associated Diagnoses: H/O three vessel coronary artery bypass      !! furosemide (LASIX) 40 MG tablet Take 1 tablet (40 mg) by mouth daily  Qty: 30 tablet, Refills: 0    Associated Diagnoses: S/P CABG (coronary artery bypass graft)      insulin glargine (LANTUS PEN) 100 UNIT/ML pen Inject 12 Units Subcutaneous every morning (before breakfast)  Qty: 3.6 mL, Refills: 0    Comments: If Lantus is not covered by insurance, may substitute Basaglar at same  dose and frequency.    Associated Diagnoses: S/P CABG (coronary artery bypass graft)      lisinopril (ZESTRIL) 5 MG tablet Take 1 tablet (5 mg) by mouth daily  Qty: 30 tablet, Refills: 0    Associated Diagnoses: S/P CABG (coronary artery bypass graft)      multivitamin w/minerals (THERA-VIT-M) tablet Take 1 tablet by mouth daily  Qty: 30 tablet, Refills: 0    Associated Diagnoses: S/P CABG (coronary artery bypass graft)      pantoprazole (PROTONIX) 40 MG EC tablet Take 1 tablet (40 mg) by mouth every morning (before breakfast)  Qty: 30 tablet, Refills: 0    Associated Diagnoses: S/P CABG (coronary artery bypass graft)      potassium chloride ER (KLOR-CON M) 20 MEQ CR tablet Take 1 tablet (20 mEq) by mouth daily  Qty: 30 tablet, Refills: 0    Associated Diagnoses: Coronary artery disease involving native coronary artery of native heart with other form of angina pectoris (H); Ischemic cardiomyopathy; Type 2 diabetes mellitus without complication, without long-term current use of insulin (H); H/O three vessel coronary artery bypass      thiamine (B-1) 100 MG tablet Take 1 tablet (100 mg) by mouth daily  Qty: 30 tablet, Refills: 0    Associated Diagnoses: S/P CABG (coronary artery bypass graft)      !! warfarin ANTICOAGULANT (COUMADIN) 1 MG tablet Take one 1mg tablet and one 2mg tablet (3mg) daily until next INR check  Qty: 30 tablet, Refills: 0    Comments: Please do INR check on 2/8/2021  Associated Diagnoses: Coronary artery disease involving native coronary artery of native heart with other form of angina pectoris (H); Ischemic cardiomyopathy; Type 2 diabetes mellitus without complication, without long-term current use of insulin (H); H/O three vessel coronary artery bypass      !! warfarin ANTICOAGULANT (COUMADIN) 2 MG tablet Take one 1mg tablet and one 2mg tablet (3mg) daily until next INR check  Qty: 30 tablet, Refills: 0    Comments: Please do INR check on 2/8/2021  Associated Diagnoses: Coronary artery disease  involving native coronary artery of native heart with other form of angina pectoris (H); Ischemic cardiomyopathy; Type 2 diabetes mellitus without complication, without long-term current use of insulin (H); H/O three vessel coronary artery bypass       !! - Potential duplicate medications found. Please discuss with provider.      CONTINUE these medications which have NOT CHANGED    Details   albuterol (PROAIR HFA/PROVENTIL HFA/VENTOLIN HFA) 108 (90 Base) MCG/ACT inhaler Inhale 2 puffs into the lungs every 6 hours as needed for wheezing or shortness of breath / dyspnea  Qty:      Comments: Pharmacy may dispense brand covered by insurance (Proair, or proventil or ventolin or generic albuterol inhaler)  Associated Diagnoses: S/P CABG (coronary artery bypass graft)      melatonin 1 MG TABS tablet Take 1 tablet (1 mg) by mouth nightly as needed for sleep  Qty:      Associated Diagnoses: S/P CABG (coronary artery bypass graft)      polyethylene glycol (MIRALAX) 17 GM/Dose powder Take 17 g by mouth daily as needed for constipation  Qty: 510 g    Associated Diagnoses: S/P CABG (coronary artery bypass graft)      senna-docusate (SENOKOT-S/PERICOLACE) 8.6-50 MG tablet Take 1 tablet by mouth daily hold for loose stools    Associated Diagnoses: S/P CABG (coronary artery bypass graft)         STOP taking these medications       BETA BLOCKER NOT PRESCRIBED (INTENTIONAL) Comments:   Reason for Stopping:         HYDROmorphone (DILAUDID) 2 MG tablet Comments:   Reason for Stopping:         insulin aspart (NOVOLOG PEN) 100 UNIT/ML pen Comments:   Reason for Stopping:         metFORMIN (GLUCOPHAGE) 500 MG tablet Comments:   Reason for Stopping:         methocarbamol (ROBAXIN) 500 MG tablet Comments:   Reason for Stopping:         nitroGLYcerin (NITROSTAT) 0.4 MG sublingual tablet Comments:   Reason for Stopping:                 DISCHARGE INSTRUCTIONS AND FOLLOW UP  Discharge Procedure Orders   NEUROPSYCHOLOGY REFERRAL   Referral  Priority: Routine Referral Type: Mental Health Outpatient   Number of Visits Requested: 1     Occupational Therapy Referral   Standing Status: Future   Referral Priority: Routine Referral Type: Occupational Therapy   Number of Visits Requested: 1     CARDIAC REHAB REFERRAL   Standing Status: Future   Referral Priority: Routine Referral Type: Rehab Therapy Cardiac Therapy   Number of Visits Requested: 1     Reason for your hospital stay   Order Comments: Coronary Artery Bypass surgery times 3     Activity   Order Comments: Sternal precautions  Do not lift weight more than 10 pounds for 6 weeks post surgery and no more than 20 pounds for the next 6 weeks.    --Activity as tolerated in moderation and in accordance with the safety precautions instructed by your therapists   --No driving until approved by a physician familiar with your medical complexity   --Recommend formal 's evaluation prior to returning to driving.  Please discuss with your occupational therapist or PM&R physician for further details.     Order Specific Question Answer Comments   Is discharge order? Yes      Wound care   Order Comments: --Sternal incision and chest tube sites  Wash daily and pad dry  Keep clean and dry       --Sacral and buttock wounds: Every 3 days   Cleanse the area with Microklenz and pat dry.  Apply No sting film barrier to periwound skin.  Cover wound with Mepilex. Sacral Mepilex (#[id number])   Change dressing Q 3 days or more frequently for drainage.  If Mepilex requiring changing more than once a shift ok to switch to cleanse BID and apply gauze dressings where able.      Monitor and record   Order Comments: blood glucose 4 times a day, before meals and at bedtime  blood pressure daily  weight every day  make diary of readings to bring to the appointment with your PCP     Follow Up and recommended labs and tests   Order Comments: - Follow up with Primary Care  You are scheduled to see Dr. Woodruff on Thursday, February  11th at 8:45 AM.    Address  [facility]                          [address] [place] [address]   Phone   [phone]  Fax                  [phone]    - Follow up with Cardiology  You are scheduled to see Dr. Olvera on Thursday, March 4th at 1:40 PM. Please arrive at 1:20 PM for check in.    Address  [facility] [place]                          [address]                          [address]                          [place] [address]  Phone   [phone]    - Follow up for neuropsychology evaluation   You are scheduled to see Dr. Dodd on Tuesday, March 30th at 8:00 AM. This is scheduled as a virtual visit.     Address  [facility]  Phone   [phone]    - Follow up with urology team (PCP can place the referral if agreeable)    - Follow up with dermatology regarding history of hidradenitis (please arrange for virtual clinic visit within 4 weeks)    -Please do an INR check on 2/8/2021     Full Code     Order Specific Question Answer Comments   Code status determined by: Discussion with patient/ legal decision maker      Diet   Order Comments: Follow this diet upon discharge: Orders Placed This Encounter      Room Service      Regular Diet Adult Thin Liquids (water, ice chips, juice, milk, gelatin, ice cream, etc)     Order Specific Question Answer Comments   Is discharge order? Yes           PHYSICAL EXAMINATION    Most recent Vital Signs:   Vitals:    02/04/21 0730 02/04/21 1738 02/05/21 0624 02/05/21 0925   BP: 133/63 118/57  108/63   BP Location: Right arm Right arm  Right arm   Pulse: 66 71  74   Resp: 20 18  16   Temp: 98.9  F (37.2  C) 97.9  F (36.6  C)  97  F (36.1  C)   TempSrc: Oral Oral  Oral   SpO2: 96% 97%  99%   Weight:   82.1 kg (181 lb)    Height:           Gen: Alert, oriented, lying comfortably in bed, not in acute distress  Cardio: S1 S2 present RRR  Pulm: CTAB, Wounds healing well  with scab formation  Abd: soft, non-tender, non distended, bowel sounds present  Ext: no edema in bilateral lower extremities    Neuro/MSK: alert and oriented, no focal weakness other than L hand (FE),   Gentourinary: Right groin region 2 into 4 cm abscess with a small opening, digitally expressed out 5 ml pus mixed blood     Patient was evaluated on day of discharge by attending physician, Eleanor Marion MD, who agrees with plan of care.    Discharge summary was forwarded to No Ref-Primary, Physician (PCP) at the time of discharge, so as to bridge from hospital to outpatient care.     It was our pleasure to care for Jaxon Melendez during this hospitalization. Please do not hesitate to contact me should there be questions regarding the hospital course or discharge plan.      Patient was seen and discussed with my staff physician Dr. Marion, who agrees with my discharge summary.    Rogelio Wong   PGY 2  Physical Medicine and Rehabilitation  Pager: [phone]        I, Eleanor aMrion MD, saw and evaluated this patient prior to discharge. I discussed the patient with Dr. Wong and agree with the plan of care as documented in his note.    I personally reviewed the chart (vitals signs, medications, labs and imaging), and spent 40 minutes on discharge activities.

## 2021-02-03 NOTE — PLAN OF CARE
Discharge Planner Post-Acute Rehab PT:      Discharge Plan: to daughter's home with 24/7 supervision      Precautions: sternal      Current Status:  Bed Mobility: IND from flat bed follows sternal precuations  Transfer: IND  Gait: >1100' with SBA and no assistive device   Stairs: 12 stairs, reciprocal step pattern, no railing, SBA  Balance: intact sitting and standing without UE support      Assessment: Patient progressing towards goals.  He tolerated aerobic exercise with stable vitals.  Ambulated for 9 minutes at a slow pace without stopping.  Pt is independent in his room without the walker. Patient will benefit from strength and conditioning during stay and OP cardiac rehab upon discharge.     Other Barriers to Discharge (DME, Family Training, etc): none

## 2021-02-03 NOTE — CONSULTS
Patient and daughter had warfarin class on 2/1- no need for further education.    Josiah Messina RN  [facility]

## 2021-02-03 NOTE — PROGRESS NOTES
"ARU Care Coordinator Progress Note    Admission date: 2/1/2021    Data: Silvio Melendez is a 51 yo male on ARU for rehab following hospitalization for CABG x3.     Intervention: Met with patient to introduce the role of Care Coordinator and to begin discussion of anticipated discharge planning needs. Spoke to Lila at [facility] (Ph [phone], fax [phone]), confirmed that PCP and she will manage Coumadin/INR. Contacted Diabetes RN Educator Sarah, who will plan insulin teaching review tomorrow afternoon, time TBD, she will call ARU charge nurse.    Assessment: Patient will do outpatient therapy, He will stay with his daughter in nearby town \"for a couple of weeks\". He is fairly new diabetic, on insulin only several days at home before being hospitalized, has glucometer, would benefit from insulin/diabetes teaching review. He had PLC anticoagulation class already. He has hidradenitis suppurativa wound to buttock that he has been managing at home for 15 years. Currently has Mepilex sacral dressing to change every 3 days and as needed. Reviewed that dressings not generally covered by insurance, but are easily purchased on [employer] or other online stores. He states he will decide what to do, typically uses gauze at home, sometimes needs no dressing if no drainage.     Plan: [employer] discharge date is 2/5/2021, daughter to provide ride to her home where he will be staying. PCP follow-up appointment already schedule 2/11. Cardiology follow-up appointment scheduled 2/16.    Tera Khoury RN, BSN, Patient Care Management Coordinator  [facility]  [address] [place] [address]  [email or web address]  [email or web address]   Desk: [phone] TCU Main [phone] Fax [phone] Pager [phone]    "

## 2021-02-03 NOTE — PLAN OF CARE
FOCUS/GOAL  Safety management    ASSESSMENT, INTERVENTIONS AND CONTINUING PLAN FOR GOAL:  Pt A1 with walker, Aox4 able to make needs known.  Using call light appropriately.  Slept well.  Sternal pain controlled with scheduled Tylenol and PRN Dilaudid.  Nursing will continue to monitor.

## 2021-02-03 NOTE — PLAN OF CARE
FOCUS/GOAL  Bowel management, Bladder management, Pain management, and Safety management    ASSESSMENT, INTERVENTIONS AND CONTINUING PLAN FOR GOAL:  Patient is alert and oriented x 4. Uses his call light appropriately. On sternal precaution. Denied pain early in the shift but eventually asked for PRN Dilaudid at hs for pain on post-op site which helped. Transfers with minimum assist of 1 using gait belt and walker. Continent with both bowel and bladder. Uses the urinal at bed side. Dressing on coccyx and buttocks in place.

## 2021-02-03 NOTE — CONSULTS
I consulted with Dr. Marion and Dr. Wong and it was decided that an outpatient telehealth neuropsychological evaluation would be the best option for this patient, given his very receent CABG and planned discharge.  We will expedite scheduling the patient once we receive the outpatient consultation request. Thank you for the referral.    Hawk Dodd, PhD, ABPP

## 2021-02-03 NOTE — PROGRESS NOTES
[employer] insurance RN CM called and left  for SW offering assistance with discharge planning. Insurance RN CM stated that if no needs, no need to return call. No call returned as no SW needs identified at this time.     Rosaura PH: [phone]    CHECO Champagne, [facility] Acute Rehab Unit   Phone: [phone]  I   Pager: [phone]

## 2021-02-03 NOTE — PROGRESS NOTES
[facility]     Medicine Progress Note - Hospitalist Service       Date of Admission:  2/1/2021    Assessment and Plan  Jaxon Melendez is a 50 year old male with a history of CAD s/p PCI to RCA and LCx (2010), DM2, HTN, HLD, and alcohol abuse admitted to OSH with ACS, found to have multivessel disease including in-stent restenosis and EF of 10%. Transferred to [facility] on 1/21/21 and underwent 3 vessel CABG on 1/22. Post-op course c/b acute hypoxic respiratory failure in setting of unstable wide complex tachycardia s/p DCCV x 3 and IV Amiodarone. Transferred to TCU on 2/1/21 for rehabilitation.     #CAD s/p 3v CABG (1/22/21) and PCI to RCA and LCx (2010)  #ICM (LVEF 30-35%)   Post-op Echo (1/29/21) with EF 30-35%, no WMAs, indeterminate diastolic function. No lifevest per EP. Appears euvolemic. Weight 91.8 kg on hospital admit --> 83 kg today. Pain controlled.  - Decrease Lasix to 40 mg QD and KCl to 20 mEq QD. Daily weights, I/Os  - ASA 81 mg daily and Atorvastatin  - Lisinopril 5 mg daily. BB deferred d/t recent bradycardia.  - Pain: Tylenol TID, Robaxin QID PRN, Lidocaine patches, Dilaudid 2-4 mg q 4h PRN (taper as able)  - Repeat Echo in 3 months     #Wide Complex Tachycardia - On 1/25/21. Suspected AF with aberrancy. S/p DDCV x 3 and IV Amiodarone. CHADSVasc 4. INR 1.96 today - no need for bridging per CVTS.  - Amiodarone 400 mg daily until 2/8, then 200 mg daily until 3/29 to complete 8 week course.  - Coumadin per pharmacy dosing. INR goal 2-3. Needs OP management plan prior to discharge.  - Outpatient EP follow up with [facility] prior      #Acute Blood Loss Anemia - BL Hgb 14-15. Hgb stable ~9-10 post-op. No s/sx of bleeding.  - CBC q M/Th     #Mild Leukocytosis - Intermittent since surgery likely 2/2 stress response. Peak of 37.1 on 1/24. WBC 11.3 (11.6) on 2/2. Afebrile. No s/sx of infection.   - Pan-culture if febrile >100.4  - Trend CBC in AM    #DM2 - A1c 11.4 on  1/21. On Metformin PTA. BG controlled.  - Lantus 12 units daily + Metformin 500 mg daily  - Diabetes education prior to discharge     #HTN - BP normotensive.  - Lisinopril 5 mg daily (started 1/30)     #Hx of Alcohol Abuse - Consuming 5-6 drinks daily PTA.   - Folic acid, MVI, Thiamine     #Sacral and Buttocks Wounds 2/2 Hidradenitis Suppurativa  - WOCN following.    Brittany Pradhan PA-C  Hospitalist Service  [facility]   Contact information available via [facility] Paging/Directory  ______________________________________________________________________    Interval History  Maribell-incisional pain tolerable, last took Dilaudid ~9 PM yesterday. Denies SOB, n/v/c/d, abdominal pain, or dysuria.    Data reviewed today: I reviewed all medications, new labs and imaging results over the last 24 hours.    Physical Exam  Vital Signs: Temp: 97.8  F (36.6  C) Temp src: Oral BP: 118/52 Pulse: 69   Resp: 20 SpO2: 95 % O2 Device: None (Room air)    Weight: 183 lbs 6.4 oz  General: Awake. Lying in bed. NAD.  HEENT: NC/AT. Anicteric sclera. MMM.  CV: RRR. Sternal incision and prior chest tube sites c/d/i.  Respiratory: Normal effort on RA. Lungs CTAB.  GI: Abdomen soft, non-tender, and non-distended. Bowel sounds present.  Neuro: Alert. Answers questions appropriately. Grossly non-focal.  Extremities: No peripheral edema. Warm and well perfused.  Skin: No rashes or jaundice on exposed areas.

## 2021-02-03 NOTE — PLAN OF CARE
Discharge Planner Post-Acute Rehab OT:     Discharge Plan: to dtr's house where dtr can provide 24/7 supervision, HH vs OP OT    Precautions: sternal    Current Status:  ADLs: Pt. Completed full shower in standing without assist. Pt. Sat to complete drying self for safety.  Pt. Had no LOB and demonstrated safety with task.   IADLs: Pt previously independent but reporting difficulty managing meds at home.  Recommend supervision for IADL initially at discharge.  Vision/Cognition: Pt with baseline memory deficits, reports he utilizes writing down information and having daughter keep track of things.  Pt scoring 6/28 on the short blessed test today where scores 5-9 indicate questionable impairment.  Pt would likely benefit from cognitive/neuropsych assessment to establish baseline and support safety with complex IADL including driving.      Assessment: Pt. Making good progress with Mod Indep/Indep with in room mobility. Pt. Demonstrates good awareness of precautions with transfers, ADL's and mobility.  Orders to be placed for Indep in room.     Other Barriers to Discharge (DME, Family Training, etc):  Will continue to assess.

## 2021-02-04 ENCOUNTER — APPOINTMENT (OUTPATIENT)
Dept: GENERAL RADIOLOGY | Facility: CLINIC | Age: 51
DRG: 949 | End: 2021-02-04
Attending: PHYSICIAN ASSISTANT
Payer: COMMERCIAL

## 2021-02-04 ENCOUNTER — APPOINTMENT (OUTPATIENT)
Dept: PHYSICAL THERAPY | Facility: CLINIC | Age: 51
End: 2021-02-04
Payer: COMMERCIAL

## 2021-02-04 ENCOUNTER — APPOINTMENT (OUTPATIENT)
Dept: OCCUPATIONAL THERAPY | Facility: CLINIC | Age: 51
End: 2021-02-04
Payer: COMMERCIAL

## 2021-02-04 ENCOUNTER — APPOINTMENT (OUTPATIENT)
Dept: ULTRASOUND IMAGING | Facility: CLINIC | Age: 51
DRG: 949 | End: 2021-02-04
Attending: PHYSICIAN ASSISTANT
Payer: COMMERCIAL

## 2021-02-04 LAB
ALBUMIN UR-MCNC: 10 MG/DL
ANION GAP SERPL CALCULATED.3IONS-SCNC: 6 MMOL/L (ref 3–14)
APPEARANCE UR: CLEAR
BILIRUB UR QL STRIP: NEGATIVE
BUN SERPL-MCNC: 10 MG/DL (ref 7–30)
CALCIUM SERPL-MCNC: 8.8 MG/DL (ref 8.5–10.1)
CHLORIDE SERPL-SCNC: 104 MMOL/L (ref 94–109)
CO2 SERPL-SCNC: 28 MMOL/L (ref 20–32)
COLOR UR AUTO: YELLOW
CREAT SERPL-MCNC: 0.6 MG/DL (ref 0.66–1.25)
ERYTHROCYTE [DISTWIDTH] IN BLOOD BY AUTOMATED COUNT: 12.7 % (ref 10–15)
GFR SERPL CREATININE-BSD FRML MDRD: >90 ML/MIN/{1.73_M2}
GLUCOSE BLDC GLUCOMTR-MCNC: 128 MG/DL (ref 70–99)
GLUCOSE BLDC GLUCOMTR-MCNC: 152 MG/DL (ref 70–99)
GLUCOSE BLDC GLUCOMTR-MCNC: 170 MG/DL (ref 70–99)
GLUCOSE SERPL-MCNC: 130 MG/DL (ref 70–99)
GLUCOSE UR STRIP-MCNC: NEGATIVE MG/DL
HCT VFR BLD AUTO: 34.9 % (ref 40–53)
HGB BLD-MCNC: 11.3 G/DL (ref 13.3–17.7)
HGB UR QL STRIP: NEGATIVE
HYALINE CASTS #/AREA URNS LPF: 1 /LPF (ref 0–2)
INR PPP: 1.95 (ref 0.86–1.14)
KETONES UR STRIP-MCNC: NEGATIVE MG/DL
LEUKOCYTE ESTERASE UR QL STRIP: NEGATIVE
MCH RBC QN AUTO: 30.9 PG (ref 26.5–33)
MCHC RBC AUTO-ENTMCNC: 32.4 G/DL (ref 31.5–36.5)
MCV RBC AUTO: 95 FL (ref 78–100)
MUCOUS THREADS #/AREA URNS LPF: PRESENT /LPF
NITRATE UR QL: NEGATIVE
PH UR STRIP: 7 PH (ref 5–7)
PLATELET # BLD AUTO: 398 10E9/L (ref 150–450)
POTASSIUM SERPL-SCNC: 3.8 MMOL/L (ref 3.4–5.3)
PROCALCITONIN SERPL-MCNC: <0.05 NG/ML
RBC # BLD AUTO: 3.66 10E12/L (ref 4.4–5.9)
RBC #/AREA URNS AUTO: 1 /HPF (ref 0–2)
SODIUM SERPL-SCNC: 138 MMOL/L (ref 133–144)
SOURCE: ABNORMAL
SP GR UR STRIP: 1.02 (ref 1–1.03)
UROBILINOGEN UR STRIP-MCNC: NORMAL MG/DL (ref 0–2)
WBC # BLD AUTO: 15 10E9/L (ref 4–11)
WBC #/AREA URNS AUTO: 2 /HPF (ref 0–5)

## 2021-02-04 PROCEDURE — 80048 BASIC METABOLIC PNL TOTAL CA: CPT | Performed by: PHYSICIAN ASSISTANT

## 2021-02-04 PROCEDURE — 97750 PHYSICAL PERFORMANCE TEST: CPT | Mod: GP

## 2021-02-04 PROCEDURE — 97110 THERAPEUTIC EXERCISES: CPT | Mod: GO

## 2021-02-04 PROCEDURE — 97535 SELF CARE MNGMENT TRAINING: CPT | Mod: GO

## 2021-02-04 PROCEDURE — 76870 US EXAM SCROTUM: CPT

## 2021-02-04 PROCEDURE — 250N000013 HC RX MED GY IP 250 OP 250 PS 637: Performed by: PHYSICIAN ASSISTANT

## 2021-02-04 PROCEDURE — 81001 URINALYSIS AUTO W/SCOPE: CPT | Performed by: PHYSICIAN ASSISTANT

## 2021-02-04 PROCEDURE — 97530 THERAPEUTIC ACTIVITIES: CPT | Mod: GP

## 2021-02-04 PROCEDURE — 84145 PROCALCITONIN (PCT): CPT | Performed by: PHYSICIAN ASSISTANT

## 2021-02-04 PROCEDURE — 97110 THERAPEUTIC EXERCISES: CPT | Mod: GP

## 2021-02-04 PROCEDURE — 99233 SBSQ HOSP IP/OBS HIGH 50: CPT | Performed by: PHYSICIAN ASSISTANT

## 2021-02-04 PROCEDURE — 71046 X-RAY EXAM CHEST 2 VIEWS: CPT | Mod: 26 | Performed by: RADIOLOGY

## 2021-02-04 PROCEDURE — 250N000013 HC RX MED GY IP 250 OP 250 PS 637: Performed by: PHYSICAL MEDICINE & REHABILITATION

## 2021-02-04 PROCEDURE — 71046 X-RAY EXAM CHEST 2 VIEWS: CPT

## 2021-02-04 PROCEDURE — 99233 SBSQ HOSP IP/OBS HIGH 50: CPT | Mod: GC | Performed by: PHYSICAL MEDICINE & REHABILITATION

## 2021-02-04 PROCEDURE — 97530 THERAPEUTIC ACTIVITIES: CPT | Mod: GP | Performed by: PHYSICAL THERAPIST

## 2021-02-04 PROCEDURE — 85610 PROTHROMBIN TIME: CPT | Performed by: PHYSICIAN ASSISTANT

## 2021-02-04 PROCEDURE — 36415 COLL VENOUS BLD VENIPUNCTURE: CPT | Performed by: PHYSICIAN ASSISTANT

## 2021-02-04 PROCEDURE — 97116 GAIT TRAINING THERAPY: CPT | Mod: GP | Performed by: PHYSICAL THERAPIST

## 2021-02-04 PROCEDURE — 85027 COMPLETE CBC AUTOMATED: CPT | Performed by: PHYSICIAN ASSISTANT

## 2021-02-04 PROCEDURE — 128N000003 HC R&B REHAB

## 2021-02-04 PROCEDURE — 76870 US EXAM SCROTUM: CPT | Mod: 26 | Performed by: RADIOLOGY

## 2021-02-04 PROCEDURE — 97535 SELF CARE MNGMENT TRAINING: CPT | Mod: GO | Performed by: STUDENT IN AN ORGANIZED HEALTH CARE EDUCATION/TRAINING PROGRAM

## 2021-02-04 PROCEDURE — 999N001017 HC STATISTIC GLUCOSE BY METER IP

## 2021-02-04 RX ORDER — WARFARIN SODIUM 2 MG/1
TABLET ORAL
Qty: 30 TABLET | Refills: 0 | Status: SHIPPED | OUTPATIENT
Start: 2021-02-04 | End: 2021-02-05

## 2021-02-04 RX ORDER — FUROSEMIDE 40 MG
40 TABLET ORAL DAILY
Qty: 30 TABLET | Refills: 0 | Status: SHIPPED | OUTPATIENT
Start: 2021-02-05 | End: 2021-03-07

## 2021-02-04 RX ORDER — DOXYCYCLINE 100 MG/1
100 CAPSULE ORAL EVERY 12 HOURS
Qty: 10 CAPSULE | Refills: 0 | Status: SHIPPED | OUTPATIENT
Start: 2021-02-04 | End: 2021-02-09

## 2021-02-04 RX ORDER — DOXYCYCLINE 100 MG/1
100 CAPSULE ORAL EVERY 12 HOURS SCHEDULED
Status: DISCONTINUED | OUTPATIENT
Start: 2021-02-04 | End: 2021-02-05 | Stop reason: HOSPADM

## 2021-02-04 RX ORDER — WARFARIN SODIUM 1 MG/1
TABLET ORAL
Qty: 30 TABLET | Refills: 0 | Status: SHIPPED | OUTPATIENT
Start: 2021-02-04 | End: 2021-02-05

## 2021-02-04 RX ORDER — WARFARIN SODIUM 3 MG/1
3 TABLET ORAL
Status: COMPLETED | OUTPATIENT
Start: 2021-02-04 | End: 2021-02-04

## 2021-02-04 RX ORDER — CLINDAMYCIN PHOSPHATE 10 UG/ML
LOTION TOPICAL 2 TIMES DAILY
Qty: 60 ML | Refills: 0 | Status: SHIPPED | OUTPATIENT
Start: 2021-02-04 | End: 2021-02-14

## 2021-02-04 RX ORDER — CLINDAMYCIN PHOSPHATE 10 UG/ML
LOTION TOPICAL 2 TIMES DAILY
Status: DISCONTINUED | OUTPATIENT
Start: 2021-02-04 | End: 2021-02-05 | Stop reason: HOSPADM

## 2021-02-04 RX ORDER — POTASSIUM CHLORIDE 1500 MG/1
20 TABLET, EXTENDED RELEASE ORAL DAILY
Qty: 30 TABLET | Refills: 0 | Status: SHIPPED | OUTPATIENT
Start: 2021-02-05 | End: 2021-03-07

## 2021-02-04 RX ORDER — ACETAMINOPHEN 325 MG/1
650 TABLET ORAL EVERY 4 HOURS PRN
COMMUNITY
Start: 2021-02-04

## 2021-02-04 RX ADMIN — INSULIN GLARGINE 12 UNITS: 100 INJECTION, SOLUTION SUBCUTANEOUS at 08:36

## 2021-02-04 RX ADMIN — ACETAMINOPHEN 975 MG: 325 TABLET, FILM COATED ORAL at 14:44

## 2021-02-04 RX ADMIN — ASPIRIN 81 MG: 81 TABLET, COATED ORAL at 08:32

## 2021-02-04 RX ADMIN — AMIODARONE HYDROCHLORIDE 400 MG: 200 TABLET ORAL at 08:32

## 2021-02-04 RX ADMIN — CLINDAMYCIN PHOSPHATE: 10 LOTION TOPICAL at 20:30

## 2021-02-04 RX ADMIN — ACETAMINOPHEN 975 MG: 325 TABLET, FILM COATED ORAL at 19:07

## 2021-02-04 RX ADMIN — LISINOPRIL 5 MG: 2.5 TABLET ORAL at 08:33

## 2021-02-04 RX ADMIN — FOLIC ACID 1 MG: 1 TABLET ORAL at 08:33

## 2021-02-04 RX ADMIN — ATORVASTATIN CALCIUM 40 MG: 40 TABLET, FILM COATED ORAL at 08:33

## 2021-02-04 RX ADMIN — WARFARIN SODIUM 3 MG: 3 TABLET ORAL at 17:57

## 2021-02-04 RX ADMIN — MULTIPLE VITAMINS W/ MINERALS TAB 1 TABLET: TAB at 08:33

## 2021-02-04 RX ADMIN — FUROSEMIDE 40 MG: 20 TABLET ORAL at 08:33

## 2021-02-04 RX ADMIN — PANTOPRAZOLE SODIUM 40 MG: 40 TABLET, DELAYED RELEASE ORAL at 06:37

## 2021-02-04 RX ADMIN — LIDOCAINE 2 PATCH: 560 PATCH PERCUTANEOUS; TOPICAL; TRANSDERMAL at 08:39

## 2021-02-04 RX ADMIN — THIAMINE HCL TAB 100 MG 100 MG: 100 TAB at 08:33

## 2021-02-04 RX ADMIN — POTASSIUM CHLORIDE 20 MEQ: 750 TABLET, EXTENDED RELEASE ORAL at 08:32

## 2021-02-04 RX ADMIN — METFORMIN HYDROCHLORIDE 500 MG: 500 TABLET ORAL at 08:32

## 2021-02-04 RX ADMIN — DOXYCYCLINE HYCLATE 100 MG: 100 CAPSULE ORAL at 19:07

## 2021-02-04 RX ADMIN — ACETAMINOPHEN 975 MG: 325 TABLET, FILM COATED ORAL at 08:32

## 2021-02-04 ASSESSMENT — MIFFLIN-ST. JEOR: SCORE: 1693.62

## 2021-02-04 NOTE — PROGRESS NOTES
02/04/21 1606   Signing Clinician's Name / Credentials   Signing clinician's name / credentials Nam Giles DPT   Functional Gait Assessment (JACIEL Stoner, EMI Berger, et al. (2004))   1. GAIT LEVEL SURFACE 2   2. CHANGE IN GAIT SPEED 2   3. GAIT WITH HORIZONTAL HEAD TURNS 3   4. GAIT WITH VERTICAL HEAD TURNS 3   5. GAIT AND PIVOT TURN 3   6. STEP OVER OBSTACLE 3   7. GAIT WITH NARROW BASE OF SUPPORT 3   8. GAIT WITH EYES CLOSED 3   9. AMBULATING BACKWARDS 2   10. STEPS 3   Total Functional Gait Assessment Score   TOTAL SCORE: (MAXIMUM SCORE 30) 27   Functional Gait Assessment (FGA): The FGA assesses postural stability during various walking tasks.   Gait assistive device used: None    Patient Score: 27/30  Scores of <22/30 have been correlated with predicting falls in community-dwelling older adults according to Herbie & Sterling 2010.   Scores of <18/30 have been correlated with increased risk for falls in patients with Parkinsons Disease according to Jovan Larry, Smith et al 2014.  Minimal Detectable Change for patients with acute/chronic stroke = 4.2 according to Thistevan & Ritschel 2009  Minimal Detectable Change for patients with vestibular disorder = 8 according to Herbie & Sterling 2010    Assessment (rationale for performing, application to patient s function & care plan): Improved from 21/30 at [facility]. Low fall risk in community without AD  Minutes billed as physical performance test: 15

## 2021-02-04 NOTE — PLAN OF CARE
FOCUS/GOAL  Bowel management, Pain management, and Safety management    ASSESSMENT, INTERVENTIONS AND CONTINUING PLAN FOR GOAL:  Pt was A/O, able to use call light and make needs known to staff. Denies pain, SOB, chest pain nor dizziness. On regular diet, thin liquids, takes medicines whole. Continent of BL, no BM this shift, LBM-2/3/21. Independent in room, VSS, BGs monitored, on sternal precautions. Will continue with current POC.

## 2021-02-04 NOTE — PLAN OF CARE
"Blood pressure 133/63, pulse 66, temperature 98.9  F (37.2  C), temperature source Oral, resp. rate 20, height 1.778 m (5' 10\"), weight 82.2 kg (181 lb 4.8 oz), SpO2 96 %.  Patient is A&Ox 4. Chest incision is well approximated and healing well plus the drain sites are HANH. Indep in room. BG  117 . Continent of B/B.  Patient reported a Rt scrotal site had drainage, writer assessed and cleaned site with n/saline and applied a clean dressing. Chest xray and US of scrotal areas done today..Continue to monitor.    Patient will discharge tomorrow, all his discharge med's are here, insulin is in the fridge.  "

## 2021-02-04 NOTE — PHARMACY-ANTICOAGULATION SERVICE
Clinical Pharmacy- Warfarin Discharge Note  This patient is currently on warfarin for the treatment of Atrial fibrillation.  INR Goal= 2-3  Expected length of therapy undetermined.           Anticoagulation Dose History     Recent Dosing and Labs Latest Ref Rng & Units 1/29/2021 1/30/2021 1/31/2021 2/1/2021 2/2/2021 2/3/2021 2/4/2021    Warfarin 1 mg - - 2 mg 2 mg - - - -    Warfarin 2 mg - - - - 2 mg - - -    Warfarin 2.5 mg - - - - - 2.5 mg - -    Warfarin 3 mg - 3 mg - - - - 3 mg -    INR 0.86 - 1.14 1.69(H) 2.31(H) 2.67(H) 2.69(H) 2.42(H) 1.96(H) 1.95(H)          Vitamin K doses administered during the last 7 days: none  FFP administered during the last 7 days: none  Recommend discharging the patient on a warfarin regimen of 3 mg with a prescription for warfarin 2 mg and 1 mg tablets for dosing flexibility (the patient has been recently requiring 2-3 mg doses).     Please, note that the following medication changes:  -Doxycycline  started 2/4/2021, will stop 2/9/2021 - may increase risk of bleeding (delayed onset).  -Amiodarone dose will decreased from 400 mg po daily to 200 mg po daily on 2/8 - might have to increase warfarin dose.    The patient should have an INR checked February / 08 / 2021.  Debbie Gunderson, Charles, BCPS February 4, 2021

## 2021-02-04 NOTE — PROGRESS NOTES
[facility]     Medicine Progress Note - Hospitalist Service       Date of Admission:  2/1/2021    Assessment and Plan  Jaxon Melendez is a 50 year old male with a history of CAD s/p PCI to RCA and LCx (2010), DM2, HTN, HLD, and alcohol abuse admitted to OSH with ACS, found to have multivessel disease including in-stent restenosis and EF of 10%. Transferred to [facility] on 1/21/21 and underwent 3 vessel CABG on 1/22. Post-op course c/b acute hypoxic respiratory failure in setting of unstable wide complex tachycardia s/p DCCV x 3 and IV Amiodarone. Transferred to TCU on 2/1/21 for rehabilitation.     #CAD s/p 3v CABG (1/22/21) and PCI to RCA and LCx (2010)  #ICM (LVEF 30-35%)   Post-op Echo (1/29/21) with EF 30-35%, no WMAs, indeterminate diastolic function. No lifevest per EP. Appears euvolemic. Weight 91.8 kg on hospital admit --> 82 kg today. Lasix decreased 2/3. Pain controlled.  - Lasix 40 mg QD and KCl 20 mEq QD. Daily weights, I/Os  - ASA 81 mg daily and Atorvastatin  - Lisinopril 5 mg daily. BB deferred d/t recent bradycardia.  - Pain: Tylenol TID, Robaxin QID PRN, Lidocaine patches, Dilaudid 2-4 mg q 4h PRN (taper as able)  - Repeat Echo in 3 months     #Wide Complex Tachycardia - On 1/25/21. Suspected AF with aberrancy. S/p DDCV x 3 and IV Amiodarone. CHADSVasc 4. INR 1.95 today - no need for bridging per d/w CVTS on 2/3.  - Amiodarone 400 mg daily until 2/8, then 200 mg daily until 3/29 to complete 8 week course.  - Coumadin per pharmacy dosing. INR goal 2-3. Needs OP management plan prior to discharge.  - Outpatient EP follow up with [facility] prior     #Leukocytosis  #Right Groin Induration with Hx of Hidradenitis Suppurativa  Intermittent WBC elevation since surgery. Peak of 37.1 on 1/24. WBC increased to 15.0 today (11.3 on 2/2), though all cell counts increased on CBC. Afebrile. Procal <0.05. Area of induration in R scrotum that is painful to touch (see photo  below) which spontaneously drained 2/4. Incisions and buttocks/sacral wound without evidence of infection. No urinary or respiratory symptoms.  - Testicular and Scrotal US for further evaluation of R groin lesion  - UA/UC  - CXR  - Trend CBC in AM  - D/w Dermatology who recommends topical therapy with Benzyl Peroxide 5% wash and Clindamycin lotion PRN to active lesions.  - Dermatology will arrange virtual visit with patient within 1 month of discharge for ongoing management of hidradenitis.    ADDENDUM: Testicular US with complex mixed echogenicity collection in the lateral R scrotum most c/w abscess; no evidence of epididymoorchitis. Start Doxycycline 100 mg BID x 5 days. Follow up with PCP within 1 week for re-evaluation. PCP to consider Urology referral.     #Acute Blood Loss Anemia - BL Hgb 14-15. Hgb stable ~9-10 post-op, improved to 11.3 on 2/4. No s/sx of bleeding.  - CBC q M/Th    #DM2 - A1c 11.4 on 1/21. BG controlled.  - Lantus 12 units daily + Metformin 500 mg daily  - Diabetes education prior to discharge     #HTN - BP normotensive.  - Lisinopril 5 mg daily (started 1/30)     #Hx of Alcohol Abuse - Consuming 5-6 drinks daily PTA.   - Folic acid, MVI, Thiamine     #Concern for Impaired Cognition - A&O x 3 with short term memory deficits. Poor recall of conversations and medical conditions. Suspect multifactorial r/t alcohol abuse, recent surgery and hospitalization, opiate use.  - Outpatient Neuropsych testing    #Sacral and Buttocks Wounds 2/2 Hidradenitis Suppurativa - No s/sx of infection.  - WOCN following.    Brittany Pradhan PA-C  Hospitalist Service  [facility]   Contact information available via [facility] Paging/Directory  ______________________________________________________________________    Interval History  R scrotal/groin pain today associated with focal area of swelling which is tender to touch. Hx of hidradenitis suppurativa, does not recall having  groin lesions before. Minimal cough. Maribell-incisional pain controlled. Denies SOB, n/v/c/d, abdominal pain, dysuria, or LE edema.    ADDENDUM: On re-evaluation, patient reported scrotal lesion began draining and was feeling much better.    Data reviewed today: I reviewed all medications, new labs and imaging results over the last 24 hours.    Physical Exam  Vital Signs: Temp: 98.9  F (37.2  C) Temp src: Oral BP: 133/63 Pulse: 66   Resp: 20 SpO2: 96 % O2 Device: None (Room air)    Weight: 181 lbs 4.8 oz  General: Awake. Lying in bed. NAD.  HEENT: NC/AT. Anicteric sclera. MMM.  CV: RRR. Sternal incision and prior chest tube sites c/d/i.  Respiratory: Normal effort on RA. Lungs CTAB.  GI: Abdomen soft, non-tender, and non-distended. Bowel sounds present.  : Mild scrotal edema. Focal area of induration along R lateral scrotum which is tender to palpation; see photo below.  Neuro: Alert. Answers questions appropriately. Grossly non-focal.  Extremities: No peripheral edema. Warm and well perfused.  Skin: Buttocks wounds c/d/i without evidence of infection. No rashes or jaundice on exposed areas.

## 2021-02-04 NOTE — PLAN OF CARE
Discharge Planner Post-Acute Rehab OT:      Discharge Plan: to dtr's house where dtr can provide 24/7 supervision,  OP OT and CR     Precautions: sternal     Current Status:  ADLs: Pt. Completed full shower in standing without assist. Pt. Sat to complete drying self for safety.  Pt. Had no LOB and demonstrated safety with task.   IADLs: Pt previously independent but reporting difficulty managing meds at home.  Recommend supervision for IADL initially at discharge.  Vision/Cognition: Pt with baseline memory deficits, reports he utilizes writing down information and having daughter keep track of things.  Pt scoring 6/28 on the short blessed test today where scores 5-9 indicate questionable impairment.  Pt would likely benefit from cognitive/neuropsych assessment to establish baseline and support safety with complex IADL including driving.       Assessment: -30 due to pt being off the unit. Once back pt completed a stovetop kitchen task and simulated med setup task for higher level cognition. Pt did well. Still recommending 24/7 supervision and assist with IADL. Therapist talked to his daughter and educated her on level of assist he will require. She is in agreement. Pt will have OP OT and  CR at [facility] in [facility].     Other Barriers to Discharge (DME, Family Training, etc):  none

## 2021-02-04 NOTE — PROGRESS NOTES
"  [facility]   Acute Rehabilitation Unit  Daily progress note    INTERVAL HISTORY  Jaxon Melendez was seen and examined in his room, laying comfortably in bed, sleeping. He was was easily arousable.  He said that he had right groin pain 3/5 in intensity, pain has been constant and minutes were 6/5.  He said that he did not sleep well and and was on and off waking up.  He denied any other complaints.    He denies any nausea, vomiting, headache, chest pain, shortness of breath or burning micturition.  Patient white count this morning was 15 but was afebrile.    Functionally  As per PT notes    Bed Mobility: IND from flat bed follows sternal precuations  Transfer: IND  Gait: >1100' with SBA and no assistive device   Stairs: 12 stairs, reciprocal step pattern, no railing, SBA  Balance: intact sitting and standing without UE support         MEDICATIONS    acetaminophen  975 mg Oral TID     [START ON 2/8/2021] amiodarone  200 mg Oral Daily     amiodarone  400 mg Oral Daily     aspirin  81 mg Oral QAM     atorvastatin  40 mg Oral QAM     folic acid  1 mg Oral Daily     furosemide  40 mg Oral Daily     insulin glargine  12 Units Subcutaneous QAM AC     lidocaine  1-3 patch Transdermal Q24H     lidocaine   Transdermal Q8H     lisinopril  5 mg Oral Daily     metFORMIN  500 mg Oral Daily with breakfast     multivitamin w/minerals  1 tablet Oral Daily     pantoprazole  40 mg Oral QAM AC     potassium chloride ER  20 mEq Oral Daily     thiamine  100 mg Oral Daily        albuterol, glucose **OR** dextrose **OR** glucagon, HYDROmorphone, methocarbamol, naloxone **OR** naloxone **OR** naloxone **OR** naloxone, nitroGLYcerin, - MEDICATION INSTRUCTIONS -, polyethylene glycol, senna-docusate, Warfarin Therapy Reminder     PHYSICAL EXAM  /63 (BP Location: Right arm)   Pulse 66   Temp 98.9  F (37.2  C) (Oral)   Resp 20   Ht 1.778 m (5' 10\")   Wt 82.2 kg (181 lb 4.8 oz)   SpO2 96%   BMI " 26.01 kg/m      Gen: Lying comfortably in bed, not in acute distress,alert  HEENT: Extraocular movement intact, neck supple  Cardio: S1-S2 present, regular, rate, rhythm.  Pulm: Clear to auscultation bilaterally, breathing comfortably on room air.  Abd: soft, non-tender  Ext: no edema in bilateral lower extremities    Neuro/MSK: alert and oriented, no focal weakness other than L hand (FE), not able to remember previous discussions and his meds  Genitourinary: He has a swelling in the right groin region 5 to 5 cm, skin overlying the reddened, tender to touch, hard in consistency non-fluctuant.    LABS  CBC RESULTS:   Lab Results   Component Value Date    WBC 15.0 02/04/2021     Lab Results   Component Value Date    RBC 3.66 02/04/2021     Lab Results   Component Value Date    HGB 11.3 02/04/2021     Lab Results   Component Value Date    HCT 34.9 02/04/2021     Lab Results   Component Value Date    MCV 95 02/04/2021     Lab Results   Component Value Date    MCH 30.9 02/04/2021     Lab Results   Component Value Date    MCHC 32.4 02/04/2021     Lab Results   Component Value Date    RDW 12.7 02/04/2021     Lab Results   Component Value Date     02/04/2021       Last Comprehensive Metabolic Panel:  Sodium   Date Value Ref Range Status   02/04/2021 138 133 - 144 mmol/L Final     Potassium   Date Value Ref Range Status   02/04/2021 3.8 3.4 - 5.3 mmol/L Final     Chloride   Date Value Ref Range Status   02/04/2021 104 94 - 109 mmol/L Final     Carbon Dioxide   Date Value Ref Range Status   02/04/2021 28 20 - 32 mmol/L Final     Anion Gap   Date Value Ref Range Status   02/04/2021 6 3 - 14 mmol/L Final     Glucose   Date Value Ref Range Status   02/04/2021 130 (H) 70 - 99 mg/dL Final     Urea Nitrogen   Date Value Ref Range Status   02/04/2021 10 7 - 30 mg/dL Final     Creatinine   Date Value Ref Range Status   02/04/2021 0.60 (L) 0.66 - 1.25 mg/dL Final     GFR Estimate   Date Value Ref Range Status   02/04/2021 >90  >60 mL/min/[1.73_m2] Final     Comment:     Non  GFR Calc  Starting 12/18/2018, serum creatinine based estimated GFR (eGFR) will be   calculated using the Chronic Kidney Disease Epidemiology Collaboration   (CKD-EPI) equation.       Calcium   Date Value Ref Range Status   02/04/2021 8.8 8.5 - 10.1 mg/dL Final     Bilirubin Total   Date Value Ref Range Status   01/23/2021 0.9 0.2 - 1.3 mg/dL Final     Alkaline Phosphatase   Date Value Ref Range Status   01/23/2021 62 40 - 150 U/L Final     ALT   Date Value Ref Range Status   01/23/2021 10 0 - 70 U/L Final     AST   Date Value Ref Range Status   01/23/2021 21 0 - 45 U/L Final     ASSESSMENT / PLAN    Jaxon Melendez is a 50 year old male with past medical history of hyperlipidemia, hypertension, DM 2, hidradenitis suppurativa in the presacral region, tobacco use disorder, alcohol use disorder, ischemic cardiomyopathy with ejection fraction of 10 to 20%, CAD s/p PCI to RCA and LCx in 2010, was [facility] to [facility] on 1/21/2021 for retrosternal chest pain and underwent CABG x3 for multivessel disease(left internal mammary artery, left anterior descending artery, saphenous vein graft to first diagonal artery, saphenous vein graft to obtuse marginal artery) on 1/22/2021. Admission to acute inpatient rehab: February 1, 2021.    Rehabilitation - continue comprehensive acute inpatient rehabilitation program with multidisciplinary approach including therapies, rehab nursing, and physiatry following. See interval history for updates.      Medical Conditions  #S/p CABG x3  #Coronary artery disease  #Ischemic cardiomyopathy with low ejection fraction of 30-35%  Patient has rapid wide-complex tachycardia with cardioversion x3 on 1/25/2021 and amiodarone bolus was given.  EP was consulted and recommended amiodarone for 8 weeks.  Amiodarone load given on 1/27/2021 and transition 400 mg daily due to heart rate is in 50s on 1/29/2021.  Warfarin bridged with heparin  infusion.  Echocardiogram done on 1/29/2021 showed ejection fraction of 30 to 25%, no LifeVest per EP.  Will follow with outpatient EP with echo in 3 months.  -Daily weights  -Sternal precautions, not lifting weight more than 10 pounds for 6 weeks.  -Continue amiodarone 400 mg daily and then 200 mg daily(starting 2/8/2021)  -Aspirin 81 mg daily  -Continue warfarin daily dosed by pharmacy.  -Hold beta-blockers as patient is bradycardic.  -Furosemide 40 mg twice daily. 02/03/21 was changed to daily per hospitalist team  - KCl 20 daily   -NTG 0.4 mg ever 5 min prn for chest pain.     #Postoperative pain management  Patient was continued on scheduled Tylenol and Robaxin with Dilaudid 4 mg every 3 hours.  As patient has history of alcohol abuse he may require higher amount of medications for pain management.  -Continue scheduled Tylenol, Robaxin  -Continue as needed dilaudid 2-4 mg q4 hours  -Lidocaine patches q shift.  02/03/21 He has not had any methocarbamol since yesterday and had only one dose if dilaudid; may not even need these for home.      #Left hand weakness  Noted that he had temporary paresthesia in fingers b/l after surgery which resolved. Has noted some weakness in his left hand since last night 1/31 with no paresthesia or numbness. Exam showed weakness with finger ext at 2/5, WE was 4-/5, and other radially innervated muscles were also relatively weak but as significant as EDC. Suspect PIN neuropathy with no known injury at this point.   -Will monitor and discuss with the therapy team regarding ROM and strengthening exercises   -Will need NCS/EMG as outpatient if persistent.    #Swelling in the right groin region  #Leukocytosis  Patient started having swelling 5 to 5 cm in the right groin region, patient does not recall that if it is increased in size or not. Swelling is tender and he has a white count of 15, proCal <0.05, repeat CBC tomorrow. USG scrotum concerning for abcess.   -As per Hospitalist team  recs: Testicular US with complex mixed echogenicity collection in the lateral R scrotum most c/w abscess; no evidence of epididymoorchitis. Started Doxycycline 100 mg BID x 5 days. Follow up with PCP within 1 week for re-evaluation. PCP to consider Urology referral.  -Benzyl Peroxide 5% wash and Clindamycin lotion PRN to active lesions.  -We will continue to monitor.    #Acute postop respiratory failure (resolved)  #Aspiration pneumonia?  Patient was reintubated postop.  On 1/21/2021 and extubated on 1/26/2021 and improved with diuresis.  Patient infective work-up was negative for the UA, Gram stain negative for NG tube cultures.  Was started on cefepime and vancomycin (1/25-1/28/2021).  -Albuterol nebs as needed.  -We will continue to monitor.     #Diabetes mellitus  -Continue Lantus 12 units on 1/29/2021(PTA dose of 25 units)  -Metformin 500 mg daily with breakfast.  -Continue high dose sliding scale; discontinued at discharge     #Hypertension  -Continue lisinopril 5 mg daily(started on 1/30/2021)     #Hyperlipidemia  -Continue atorvastatin 40 mg daily     #Alcohol use disorder  #Tobacco use disorder  -Education for alcohol and tobacco cessation  -Continue thiamine/folate.  -Continue gabapentin as above.     #Hypospadias  -Blackwell discontinued on 1/27/2021, voiding independently.     #Hidradenitis suppurativa in the presacral region  -Wound nurse consult done on 2/1/2021, appreciate recs  -As per wound nurse consult note.  Sacral and buttock wounds: Every 3 days     Cleanse the area with Microklenz and pat dry.    Apply No sting film barrier to periwound skin.    Cover wound with Mepilex. Sacral Mepilex (#[id number])     Change dressing Q 3 days or more frequently for drainage.    If Mepilex requiring changing more than once a shift ok to switch to cleanse BID and apply gauze dressings where able.      1. Adjustment to disability:  Clinical psychology to eval and treat as indicated  2. FEN: regular diet, thin  liquids  3. Bowel: continent  4. Bladder: continent  5. DVT Prophylaxis: warfarin and aspirin  6. GI Prophylaxis: pantoprazole 40 mg daily  7. Code: Full   8. Disposition: Home   9. ELOS: 5-7 days  10. Rehab prognosis:  Good  11. Follow up Appointments on Discharge: EP(cardiology) in 3 months with ECHO, PCP in 1 week.     Patient was seen and discussed with my staff physician Dr. Marion, who agrees with my assessment and plan.    Rogelio Wong   PGY 2  Physical Medicine and Rehabilitation  Pager: [phone]        I, Eleanor Marion MD, saw this patient with my resident Dr. Wong and agree with the findings and plan of care as documented in his note.     I personally reviewed the chart (vitals signs, medications, and labs).     My key findings and young manege ment decisions made by me:   He had pain at right groin area / inner thigh this morning along with new leukocytosis. See the details above about our work and the plan. Pain improved after the abscess was drained (spontanously). Will repeat labs in am and WBC wnl, he can still discharge to home and continue oral and topical agents for treatment. Should have outpatient f/u with dermatology and urology as well.     He is I with basic mobility and ADLs. Recommend  OP OT and  CR at [facility] in [facility]. Nursing to review meds, wound care, insulin injection and anticoagulation with his daughter tomorrow before discharge.     I spent a total of 40 minutes face-to-face and managing the care of Jaxon Melendez. Over 50% of my time on the unit was spent counseling the patient and coordinating care. See note for details.

## 2021-02-04 NOTE — PLAN OF CARE
FOCUS/GOAL  Medical management    ASSESSMENT, INTERVENTIONS AND CONTINUING PLAN FOR GOAL:    Pt is alert and oriented. Able to make needs known. Ind in the room. Denies pain, sob, new weakness or tingling /numbness. Continent of bowel and bladder. Did not void this shift but pt said he will later as soon as he gets up. Pt wanted to sleep some more. BG at 0200 is at 128. Sleeping during rounds.

## 2021-02-04 NOTE — PLAN OF CARE
Physical Therapy Discharge Summary    Reason for therapy discharge:    Discharged to home with outpatient therapy. Cardiac.    Progress towards therapy goal(s). See goals on Care Plan in [facility] electronic health record for goal details.  Goals partially met.  Barriers to achieving goals:   pt met all mobility goals. Had a goal for continuous CV activity that was not met..    Therapy recommendation(s):    Continued therapy is recommended.  Rationale/Recommendations:  OP cardiac rehab to safely progress functional CV endurance..  Pt is discharging IND with mobility, good sternal precaution adherence. 27/30 on FGA. Safe discharge plan to discharge home with daughter's support.

## 2021-02-05 ENCOUNTER — APPOINTMENT (OUTPATIENT)
Dept: EDUCATION SERVICES | Facility: CLINIC | Age: 51
End: 2021-02-05
Payer: COMMERCIAL

## 2021-02-05 VITALS
OXYGEN SATURATION: 99 % | DIASTOLIC BLOOD PRESSURE: 63 MMHG | WEIGHT: 181 LBS | TEMPERATURE: 97 F | RESPIRATION RATE: 16 BRPM | BODY MASS INDEX: 25.91 KG/M2 | SYSTOLIC BLOOD PRESSURE: 108 MMHG | HEIGHT: 70 IN | HEART RATE: 74 BPM

## 2021-02-05 LAB
ANION GAP SERPL CALCULATED.3IONS-SCNC: 5 MMOL/L (ref 3–14)
BUN SERPL-MCNC: 14 MG/DL (ref 7–30)
CALCIUM SERPL-MCNC: 8.7 MG/DL (ref 8.5–10.1)
CHLORIDE SERPL-SCNC: 105 MMOL/L (ref 94–109)
CO2 SERPL-SCNC: 30 MMOL/L (ref 20–32)
CREAT SERPL-MCNC: 0.65 MG/DL (ref 0.66–1.25)
ERYTHROCYTE [DISTWIDTH] IN BLOOD BY AUTOMATED COUNT: 12.7 % (ref 10–15)
GFR SERPL CREATININE-BSD FRML MDRD: >90 ML/MIN/{1.73_M2}
GLUCOSE BLDC GLUCOMTR-MCNC: 147 MG/DL (ref 70–99)
GLUCOSE BLDC GLUCOMTR-MCNC: 163 MG/DL (ref 70–99)
GLUCOSE SERPL-MCNC: 139 MG/DL (ref 70–99)
HCT VFR BLD AUTO: 35.8 % (ref 40–53)
HGB BLD-MCNC: 11.5 G/DL (ref 13.3–17.7)
INR PPP: 2.02 (ref 0.86–1.14)
MCH RBC QN AUTO: 31 PG (ref 26.5–33)
MCHC RBC AUTO-ENTMCNC: 32.1 G/DL (ref 31.5–36.5)
MCV RBC AUTO: 97 FL (ref 78–100)
PLATELET # BLD AUTO: 439 10E9/L (ref 150–450)
POTASSIUM SERPL-SCNC: 3.7 MMOL/L (ref 3.4–5.3)
RBC # BLD AUTO: 3.71 10E12/L (ref 4.4–5.9)
SODIUM SERPL-SCNC: 140 MMOL/L (ref 133–144)
WBC # BLD AUTO: 14.2 10E9/L (ref 4–11)

## 2021-02-05 PROCEDURE — 999N001017 HC STATISTIC GLUCOSE BY METER IP

## 2021-02-05 PROCEDURE — 80048 BASIC METABOLIC PNL TOTAL CA: CPT | Performed by: PHYSICIAN ASSISTANT

## 2021-02-05 PROCEDURE — 99232 SBSQ HOSP IP/OBS MODERATE 35: CPT | Performed by: PHYSICIAN ASSISTANT

## 2021-02-05 PROCEDURE — 99239 HOSP IP/OBS DSCHRG MGMT >30: CPT | Mod: GC | Performed by: PHYSICAL MEDICINE & REHABILITATION

## 2021-02-05 PROCEDURE — 99207 PR CDG-MDM COMPONENT: MEETS LOW - DOWN CODED: CPT | Performed by: PHYSICIAN ASSISTANT

## 2021-02-05 PROCEDURE — 85027 COMPLETE CBC AUTOMATED: CPT | Performed by: PHYSICIAN ASSISTANT

## 2021-02-05 PROCEDURE — 85610 PROTHROMBIN TIME: CPT | Performed by: PHYSICIAN ASSISTANT

## 2021-02-05 PROCEDURE — 250N000013 HC RX MED GY IP 250 OP 250 PS 637: Performed by: PHYSICIAN ASSISTANT

## 2021-02-05 PROCEDURE — 36415 COLL VENOUS BLD VENIPUNCTURE: CPT | Performed by: PHYSICIAN ASSISTANT

## 2021-02-05 RX ORDER — WARFARIN SODIUM 3 MG/1
3 TABLET ORAL
Status: DISCONTINUED | OUTPATIENT
Start: 2021-02-05 | End: 2021-02-05 | Stop reason: HOSPADM

## 2021-02-05 RX ORDER — WARFARIN SODIUM 2 MG/1
TABLET ORAL
Qty: 30 TABLET | Refills: 0 | Status: SHIPPED | OUTPATIENT
Start: 2021-02-05

## 2021-02-05 RX ORDER — WARFARIN SODIUM 1 MG/1
TABLET ORAL
Qty: 30 TABLET | Refills: 0 | Status: SHIPPED | OUTPATIENT
Start: 2021-02-05

## 2021-02-05 RX ADMIN — ASPIRIN 81 MG: 81 TABLET, COATED ORAL at 09:41

## 2021-02-05 RX ADMIN — DOXYCYCLINE HYCLATE 100 MG: 100 CAPSULE ORAL at 09:43

## 2021-02-05 RX ADMIN — AMIODARONE HYDROCHLORIDE 400 MG: 200 TABLET ORAL at 09:43

## 2021-02-05 RX ADMIN — FUROSEMIDE 40 MG: 20 TABLET ORAL at 09:43

## 2021-02-05 RX ADMIN — INSULIN GLARGINE 12 UNITS: 100 INJECTION, SOLUTION SUBCUTANEOUS at 09:47

## 2021-02-05 RX ADMIN — MULTIPLE VITAMINS W/ MINERALS TAB 1 TABLET: TAB at 09:41

## 2021-02-05 RX ADMIN — CLINDAMYCIN PHOSPHATE: 10 LOTION TOPICAL at 09:48

## 2021-02-05 RX ADMIN — METFORMIN HYDROCHLORIDE 500 MG: 500 TABLET ORAL at 09:42

## 2021-02-05 RX ADMIN — THIAMINE HCL TAB 100 MG 100 MG: 100 TAB at 09:43

## 2021-02-05 RX ADMIN — ACETAMINOPHEN 975 MG: 325 TABLET, FILM COATED ORAL at 09:42

## 2021-02-05 RX ADMIN — FOLIC ACID 1 MG: 1 TABLET ORAL at 09:41

## 2021-02-05 RX ADMIN — PANTOPRAZOLE SODIUM 40 MG: 40 TABLET, DELAYED RELEASE ORAL at 06:25

## 2021-02-05 RX ADMIN — POTASSIUM CHLORIDE 20 MEQ: 750 TABLET, EXTENDED RELEASE ORAL at 09:43

## 2021-02-05 RX ADMIN — LISINOPRIL 5 MG: 2.5 TABLET ORAL at 09:42

## 2021-02-05 RX ADMIN — ACETAMINOPHEN 975 MG: 325 TABLET, FILM COATED ORAL at 13:18

## 2021-02-05 RX ADMIN — ATORVASTATIN CALCIUM 40 MG: 40 TABLET, FILM COATED ORAL at 09:43

## 2021-02-05 ASSESSMENT — MIFFLIN-ST. JEOR: SCORE: 1692.26

## 2021-02-05 NOTE — PROGRESS NOTES
[facility]     Medicine Progress Note - Hospitalist Service       Date of Admission:  2/1/2021    Assessment and Plan  Jaxon Melendez is a 50 year old male with a history of CAD s/p PCI to RCA and LCx (2010), DM2, HTN, HLD, and alcohol abuse admitted to OSH with ACS, found to have multivessel disease including in-stent restenosis and EF of 10%. Transferred to [facility] on 1/21/21 and underwent 3 vessel CABG on 1/22. Post-op course c/b acute hypoxic respiratory failure in setting of unstable wide complex tachycardia s/p DCCV x 3 and IV Amiodarone. Transferred to TCU on 2/1/21 for rehabilitation.     #CAD s/p 3v CABG (1/22/21) and PCI to RCA and LCx (2010)  #ICM (LVEF 30-35%)   Post-op Echo (1/29/21) with EF 30-35%, no WMAs, indeterminate diastolic function. No lifevest per EP. Weight 91.8 kg on hospital admit, stable at 82 kg since 2/4. Appears euvolemic. Lasix decreased 2/3. Pain controlled. Incisions c/d/i without evidence of infection.  - Lasix 40 mg QD and KCl 20 mEq QD.   - Daily weights, I/Os. Contact Cardiology for weight gain >2 lbs in 24 hours or >5 lbs in 1 week after discharge.  - ASA 81 mg daily and Atorvastatin  - Lisinopril 5 mg daily. BB deferred d/t recent bradycardia.  - Pain: Tylenol TID, Robaxin QID PRN, Lidocaine patches, Dilaudid 2-4 mg q 4h PRN (taper as able)  - Repeat Echo in 3 months  - Telephone visit 2/16 with CVTS.   - Follow up with Cardiology as scheduled on 3/4     #Wide Complex Tachycardia - On 1/25/21. Suspected AF with aberrancy. S/p DDCV x 3 and IV Amiodarone. CHADSVasc 4. INR 2.02 today.  - Amiodarone 400 mg daily until 2/8, then 200 mg daily until 3/29 to complete 8 week course.  - Coumadin per pharmacy dosing. INR goal 2-3.   - Outpatient EP follow up with Hector donaldson     #Leukocytosis  #Right Scrotal Abscess with Hx of Hidradenitis Suppurativa  Intermittent WBC elevation since surgery. Peak of 37.1 on 1/24. WBC increased to 15.0 on 2/4  (11.3 on 2/2). Afebrile. Procal <0.05. Painful area of induration in R scrotum; US with complex fluid collection in lateral aspect of R scrotum most c/w abscess; no evidence of epididymoorchitis. Spontaneously drained 2/4, further expressed by PM&R team 2/5. Doxycycline initiated 2/4. CXR, UA, surgical incisions, and buttocks wound without evidence of infection. WBC improved to 14.2 on 2/5.  - Doxycycline 100 mg BID x 5 days  - Benzyl Peroxide 5% wash and Clindamycin lotion PRN to active hidradenitis lesions.  - Virtual Dermatology visit on 3/5 for ongoing management of hidradenitis.  - Repeat CBC and re-evaluate R scrotal wound at PCP follow up. PCP to consider Urology referral.    #Acute Blood Loss Anemia - BL Hgb 14-15. Hgb ~9-10 post-op, improved to 11.5 on 2/5. No s/sx of bleeding.  - CBC q M/Th    #DM2 - A1c 11.4 on 1/21. BG controlled.  - Lantus 12 units daily + Metformin 500 mg daily  - Diabetes education prior to discharge     #HTN - BP normotensive.  - Lisinopril 5 mg daily      #Hx of Alcohol Abuse - Consuming 5-6 drinks daily PTA.   - Folic acid, MVI, Thiamine     #Concern for Impaired Cognition - A&O x 3 with short term memory deficits. Poor recall of conversations and medical conditions. Suspect multifactorial r/t alcohol abuse, recent surgery and hospitalization, opiate use.  - Outpatient Neuropsych testing scheduled for 3/30    #Sacral and Buttocks Wounds 2/2 Hidradenitis Suppurativa - No s/sx of infection.  - WOCN following.    Brittany Pradhan PA-C  Hospitalist Service  [facility]   Contact information available via [facility] Paging/Directory  ______________________________________________________________________    Interval History  R scrotal abscess improved after spontaneous drainage yesterday; less swollen and no longer painful. Maribell-incisional pain controlled. Denies chest pain, SOB, n/v/c/d, abdominal pain, dysuria, or BLE edema. Discharging home  today.    Data reviewed today: I reviewed all medications, new labs and imaging results over the last 24 hours.    Physical Exam  Vital Signs: Temp: 97  F (36.1  C) Temp src: Oral BP: 108/63 Pulse: 74   Resp: 16 SpO2: 99 % O2 Device: None (Room air)    Weight: 181 lbs 0 oz  General: Awake. Lying in bed. NAD.  HEENT: NC/AT. Anicteric sclera. MMM.  CV: RRR. Sternal incision and prior chest tube sites c/d/i.  Respiratory: Normal effort on RA. Lungs CTA anterolaterally.  GI: Abdomen soft, non-tender, and non-distended. Bowel sounds present.  : Mild scrotal edema. Focal area of induration along R lateral scrotum is decreased in size, no longer painful.   Neuro: Alert. Answers questions appropriately. Grossly non-focal.  Extremities: No peripheral edema. Warm and well perfused.  Skin: Buttocks wound with Mepilex in place. No rashes or jaundice on exposed areas.

## 2021-02-05 NOTE — DISCHARGE INSTRUCTIONS
Your Primary Care Provider Dr. Woodruff will manage your Coumadin/INR orders. Coumadin Clinic RN is Lila Mcneill (Ph [phone], fax [phone]). You will need to have your INR lab draws done in clinic.    Follow up Appointments    - Follow up with Primary Care  You are scheduled to see Dr. Woodruff on Thursday, February 11th at 8:45 AM.    Address  [facility]                          [address] [place] [address]   Phone   [phone]  Fax                  [phone]    - Follow up with Cardiology  You are scheduled to see Dr. Olvera on Thursday, March 4th at 1:40 PM. Please arrive at 1:20 PM for check in.    Address  [facility]                          [address]                          [address]                          [place] [address]  Phone   [phone]    - Follow up for neuropsychology evaluation   You are scheduled to see Dr. Dodd on Tuesday, March 30th at 8:00 AM. This is scheduled as a virtual visit.     Address  [facility] Neuro[facility]  Phone   [phone]    - Follow up with urology team (PCP can place the referral if agreeable)    - Follow up with dermatology regarding history of hidradenitis (please arrange for virtual clinic visit within 4 weeks)

## 2021-02-05 NOTE — CONSULTS
Met with Silvio to review his insulin plan at home. He understood that he would be giving himself 12 units on Lantus daily. He was also able to teach back and demonstrate how to work the insulin pen. He was on Lantus prior to be admitted to the hospital. He states he has a glucometer at home and knows how to do it. I asked the charge RN and bedside RN to follow up with the MDs to clarify how often they want Silvio to check his blood sugars at home. Silvio feels comfortable being able to manage the insulin at home.     Literature Given: Understanding Diabetes, Long-Acting Insulin, Healthy Injections Sites, Metformin medication handout.

## 2021-02-05 NOTE — PLAN OF CARE
Occupational Therapy Discharge Summary    Reason for therapy discharge:    All goals and outcomes met, no further needs identified.    Progress towards therapy goal(s). See goals on Care Plan in [facility] electronic health record for goal details.  Goals met    Therapy recommendation(s):    Continued therapy is recommended.  Rationale/Recommendations:  Pt is discharging with 24/7 due to cognitive deficits that may be baseline but that need to be looked into further since pt was not managing his meds safely. Pt is going to daughters home where he will have supervision for IADL and assist with higher level IADL until further notice. Recommend continued therapy for higher level cognition as well as R hand deficit in finger extension .

## 2021-02-05 NOTE — PROGRESS NOTES
Clinic hours for Arturo Ross:  Monday 8:30am - 3pm  Tuesday 8:30am - 3:45pm  Wednesday Out of Office  Thursday 8:30am - 3pm  Friday  8:30am - 3pm    If you need a refill on your prescription please call your pharmacy and let them know. Please be proactive and call before your medication runs out. The pharmacy will then contact us for the refill. Please allow 24-48 hours for the refill to be processed.     If your physician has ordered additional laboratory or radiology testing as part of your ongoing plan of care, please allow 7-10 business days from the day of your lab draw or test for the results to be sent and reviewed by your provider. If your results are critical and require more immediate intervention, you will be contacted sooner. Your results will be conveyed to you via a phone call or letter.    You may be receiving a patient satisfaction survey in the mail. Please take the time to complete, as your feedback is very important to us. We strive to make your experience exceptional and your comments help us with that goal. We look forward to hearing from you.       Individualized Overall Plan Of Care (IOPOC)      Rehab diagnosis/Impairment Group Code: 09 cardiac- cabgx3  H/o three vessel coronary artery bypass       Expected functional outcome: mod I with mobility and ADLs; should have assist and supervision with iADLs due to cognitive deficits     Clinical Impression Comments:     Mobility: PT: Pt presents with no significant functional mobility impairments that are barriers to returning home. Deficits are related to deconditioning s/p CABGx3 and 1.5 week hospital stay. Pt will benefit from strength and conditioning during stay and OP cardiac rehab upon discharge. Pt will be safe to return home with 24/7 assist from daughter when medically stable.     ADL: Pt presents with impaired ADL/IADL 2/2 the above deficits.  Pt will benefit from skilled OT to address and maximize safety and I with ADL/IADL within precautions    Communication/Cognition/Swallow:       Intensity of therapy:   PT 90 minutes, Daily, for 2-3 days   OT 90 minutes, Daily, for 5 days    Orthotics none  Education diabetes, anticoagulation and wound care  Neuropsychology Testing: Yes      Medical Prognosis: he has complex medical issues with limited insight and significant memory deficits. Needs close follow up and care coordination to prevent more complications and hospitalization.     Physician summary statement: doing well from physical perspective. Needs education on wounds, anticoagulation, sternal precautions and DM management. High risk for re-admission as above/     Discharge destination: prior home and family  Discharge rehabilitation needs: outpatient, OT and Cardiac      Estimated length of stay: 5 days       Rehabilitation Physician Eleanor Marion MD

## 2021-02-05 NOTE — PLAN OF CARE
FOCUS/GOAL  Pain management and Safety management    ASSESSMENT, INTERVENTIONS AND CONTINUING PLAN FOR GOAL:  Pt was A/O, able to use call light and make needs known to staff. Denies pain, SOB, chest pain nor dizziness. On regular diet, thin liquids, takes medicines whole. Continent of BL, no BM this shift, LBM-2/4/21. Independent in room, VSS, BGs monitored, on sternal precautions. Given health teaching on s/s of hypoglycemia and hyperglycemia and verbalized understanding. For discharge tomorrow. Will continue with current POC.

## 2021-02-05 NOTE — PLAN OF CARE
FOCUS/GOAL  Medication management and Medical management    ASSESSMENT, INTERVENTIONS AND CONTINUING PLAN FOR GOAL:  A&O, Mod I in room with walker. Makes needs known, no alarms on.  Pt denied pain during night.  Continent of bowel and bladder, LBM 2/4.  Discharging today.

## 2021-02-05 NOTE — PLAN OF CARE
Patient A&O x4. Mod I in room. Continent of bowel and bladder. Incisional pain managed w/ scheduled Tylenol. R groin wound care done and sacral mepilex dressing changed. Diabetes edu able to be done at bedside via PLC. Pt and daughter educated on wound care for discharge. Discharge medications given to take home. Novolog pen was not given to pt due to pt not being an ordered home med. Discharge instructions gone over and questions answered. Pt discharged safely around 1510 via car w/ daughter.

## 2021-02-05 NOTE — PROGRESS NOTES
Received page as KIESHA today about arranging outpatient follow up for this patient with hidradenitis who has not been seen by the [facility]. He primarily has involvement of the buttocks but mostly scarring without much activity. He did have a painful right groin nodule that improved with drainage. Recommended benzoyl peroxide 5% wash daily and clindamycin lotion as needed for active areas until we are able to establish care in clinic. Notifying dermatology clinic coordinators to arrange virtual clinic visit within 4 weeks. Patient lives far away.

## 2021-02-06 ENCOUNTER — PATIENT OUTREACH (OUTPATIENT)
Dept: CARE COORDINATION | Facility: CLINIC | Age: 51
End: 2021-02-06

## 2021-02-06 DIAGNOSIS — I25.10 CAD, MULTIPLE VESSEL: ICD-10-CM

## 2021-02-06 DIAGNOSIS — I48.0 PAROXYSMAL ATRIAL FIBRILLATION (H): Primary | ICD-10-CM

## 2021-02-15 ENCOUNTER — CARE COORDINATION (OUTPATIENT)
Dept: CARDIOLOGY | Facility: CLINIC | Age: 51
End: 2021-02-15

## 2021-02-15 NOTE — PROGRESS NOTES
Called patient to get update since being discharged from [facility] on 02/05.    Patient states he is staying with his daughter for a couple of weeks until he goes back to his own home where he lives alone.  Patient is doing all ADLs independently and walking w/o a walker.  He denies pain, but does have some chest tightness.  Incisions are C/D/I, no swelling or edema.  Patient has no questions regarding medications or recovery at this time.  He has follow up with PC on 02/19 and is also starting cardiac rehab on 02/19.  Patient follows up with his cardiologist on 03/04.  Patient has contact information if needed.

## 2021-02-16 ENCOUNTER — VIRTUAL VISIT (OUTPATIENT)
Dept: CARDIOLOGY | Facility: CLINIC | Age: 51
End: 2021-02-16
Attending: SURGERY
Payer: COMMERCIAL

## 2021-02-16 DIAGNOSIS — Z95.1 S/P CABG (CORONARY ARTERY BYPASS GRAFT): Primary | ICD-10-CM

## 2021-02-16 PROCEDURE — 99024 POSTOP FOLLOW-UP VISIT: CPT | Mod: TEL | Performed by: PHYSICIAN ASSISTANT

## 2021-02-16 NOTE — PROGRESS NOTES
Silvio is a 50 year old who is being evaluated via a billable telephone visit.      CARDIOTHORACIC SURGERY FOLLOW-UP VISIT     Jaxon Melendez   1970   [id number]      Reason for visit: Post-Op CABG x 3 with Dr. Gibbons on 1/22 /2021    HPI: Jaxon Melendez is a 50 year old year old male seen in clinic for a routine telephone follow-up appointment after surgery. Patient has past medical history  significant for HLD, HTN, DM II, CAD s/p PCI to RCA and LCx in 2010, hidradenitis suppurativa in the presacral area, ETOH abuse/use, and tobacco abuse. Hospital course was remarkable for A-Flutter with RVR and BBB. Patient was discharged to TCU on 2/1 /20. He is staying at his daughters house now.    Patient has been doing well since discharge and now returns to clinic for postop telephone visit.    Patient reports incision is healing well.    Patient denies any fever, chills, chest pain, palpitations, edema, SOB, lightheadedness, nausea and vomiting.    Patient is having Normal bowel movements and voiding without problems.    Has not been attending cardiac rehabilitation but walking everyday on his own.      Patient is on Coumadin and INR is therapeutic.  Weight has been stable overall.    Blood glucose levels have been under good control.      PAST MEDICAL HISTORY:  Past Medical History:   Diagnosis Date     Acute systolic heart failure (H)     EF 10%     CAD (coronary artery disease)      Diabetes mellitus, type 2 (H)     uncontrolled     Essential hypertension      Hidradenitis suppurativa     presacral area     Hyperlipidemia LDL goal <100      NSTEMI (non-ST elevated myocardial infarction) (H)        PAST SURGICAL HISTORY:  Past Surgical History:   Procedure Laterality Date     ANGIOGRAM      2010, 2021     BYPASS GRAFT ARTERY CORONARY N/A 1/22/2021    Procedure: Median Sternotomy, Takedown of Left Internal Mammary Artery, Endovein [place] of Left Greater Saphenous Vein, Coronary Artery Bypass Grafting x3, On  Cardiopulmonary Bypass, Transesophageal Echocardiogram per Anesthesia, Blackwell Catheter Insertion per Urology;  Surgeon: Jose Gibbons MD;  Location: [facility] OR     CV INTRA-AORTIC BALLOON PUMP INSERTION N/A 1/21/2021    Procedure: CV INTRA-AORTIC BALLOON PUMP INSERTION;  Surgeon: Dustin Nuñez MD;  Location:  [facility]ARD[facility] CATH LAB     CV SWAN RENETTA PROCEDURE N/A 1/21/2021    Procedure: [place] Renetta Procedure;  Surgeon: Dustin Nuñez MD;  Location: [facility]       CURRENT MEDICATIONS:   Current Outpatient Medications   Medication     acetaminophen (TYLENOL) 325 MG tablet     albuterol (PROAIR HFA/PROVENTIL HFA/VENTOLIN HFA) 108 (90 Base) MCG/ACT inhaler     amiodarone (PACERONE) 200 MG tablet     aspirin 81 MG EC tablet     atorvastatin (LIPITOR) 40 MG tablet     benzoyl peroxide 5 % external liquid     folic acid (FOLVITE) 1 MG tablet     furosemide (LASIX) 40 MG tablet     furosemide (LASIX) 40 MG tablet     insulin glargine (LANTUS PEN) 100 UNIT/ML pen     lisinopril (ZESTRIL) 5 MG tablet     melatonin 1 MG TABS tablet     multivitamin w/minerals (THERA-VIT-M) tablet     pantoprazole (PROTONIX) 40 MG EC tablet     polyethylene glycol (MIRALAX) 17 GM/Dose powder     potassium chloride ER (KLOR-CON M) 20 MEQ CR tablet     senna-docusate (SENOKOT-S/PERICOLACE) 8.6-50 MG tablet     thiamine (B-1) 100 MG tablet     warfarin ANTICOAGULANT (COUMADIN) 1 MG tablet     warfarin ANTICOAGULANT (COUMADIN) 2 MG tablet     amiodarone (PACERONE) 400 MG tablet     No current facility-administered medications for this visit.        ALLERGIES:    No Known Allergies      ROS:  Review of symptoms otherwise negative unless commented about in HPI.     LABS:  Last Basic Metabolic Panel:  Lab Results   Component Value Date     02/05/2021      Lab Results   Component Value Date    POTASSIUM 3.7 02/05/2021     Lab Results   Component Value Date    CHLORIDE 105 02/05/2021     Lab Results    Component Value Date    RICHARD 8.7 02/05/2021     Lab Results   Component Value Date    CO2 30 02/05/2021     Lab Results   Component Value Date    BUN 14 02/05/2021     Lab Results   Component Value Date    CR 0.65 02/05/2021     Lab Results   Component Value Date     02/05/2021       Last CBC:   Lab Results   Component Value Date    WBC 14.2 02/05/2021     Lab Results   Component Value Date    RBC 3.71 02/05/2021     Lab Results   Component Value Date    HGB 11.5 02/05/2021     Lab Results   Component Value Date    HCT 35.8 02/05/2021     No components found for: MCT  Lab Results   Component Value Date    MCV 97 02/05/2021     Lab Results   Component Value Date    MCH 31.0 02/05/2021     Lab Results   Component Value Date    MCHC 32.1 02/05/2021     Lab Results   Component Value Date    RDW 12.7 02/05/2021     Lab Results   Component Value Date     02/05/2021       INR:  Lab Results   Component Value Date    INR 2.02 02/05/2021    INR 1.95 02/04/2021    INR 1.96 02/03/2021    INR 2.42 02/02/2021    INR 2.69 02/01/2021    INR 2.67 01/31/2021    INR 2.31 01/30/2021    INR 1.69 01/29/2021    INR 1.28 01/28/2021    INR 1.24 01/27/2021    INR 1.50 01/22/2021    INR 1.54 01/22/2021         IMAGING: None    PHYSICAL EXAM:   There were no vitals taken for this visit. No exam for this visit.  General: alert and oriented x 3, pleasant, normal mood and affect while on the phone.  Neuro: no focal deficits noted  CV: no peripheral edema per pt  Pulm: easy work of breathing while talking.  Incision: incisions clean dry and intact without erythema, swelling or drainage per pt.    PROCEDURES: None       ASSESSMENT/PLAN:  Jaxon Melendez is a 50 year old year old male status post CABG x 3 who returns to clinic for postop telephone visit.     1. Surgically doing well overall.  Incisions are healing well with no signs of infection. Increasing activity and strength overall.   2. Hemodynamics are stable. No medication  changes were needed today.  3. Follow up with your cardiologist, Dr Deutsch's office, as scheduled on 3/4/20.  4. Encouraged Outpatient Cardiac Rehab.   5. Continue sternal precautions for 12 weeks from surgery date.   6. No driving for 4 weeks from surgery date.   7. Follow up with PCP as scheduled 2/19/21.      The total time spent with the patient was 25 minutes, > 50% of which was spent in counseling.    CC  Physician No Ref-Primary

## 2021-02-16 NOTE — PROGRESS NOTES
Silvio is a 50 year old who is being evaluated via a billable telephone visit.      What phone number would you like to be contacted at? [phone]  How would you like to obtain your AVS? Mail a copy

## 2021-03-07 ENCOUNTER — HEALTH MAINTENANCE LETTER (OUTPATIENT)
Age: 51
End: 2021-03-07

## 2021-05-01 ENCOUNTER — HEALTH MAINTENANCE LETTER (OUTPATIENT)
Age: 51
End: 2021-05-01

## 2021-08-15 ENCOUNTER — HEALTH MAINTENANCE LETTER (OUTPATIENT)
Age: 51
End: 2021-08-15

## 2021-10-11 ENCOUNTER — HEALTH MAINTENANCE LETTER (OUTPATIENT)
Age: 51
End: 2021-10-11

## 2021-11-29 NOTE — LETTER
2/16/2021      RE: Jaxon Melendez  [address] [facility] [address]       Dear Colleague,    Thank you for the opportunity to participate in the care of your patient, Jaxon Melendez, at the [facility] [place] [facility]. Please see a copy of my visit note below.    Silvio is a 50 year old who is being evaluated via a billable telephone visit.      What phone number would you like to be contacted at? [phone]  How would you like to obtain your AVS? Mail a copy        Silvio is a 50 year old who is being evaluated via a billable telephone visit.      CARDIOTHORACIC SURGERY FOLLOW-UP VISIT     Jaxon Melendez   1970   [id number]      Reason for visit: Post-Op CABG x 3 with Dr. Gibbons on 1/22 /2021    HPI: Jaxon Melendez is a 50 year old year old male seen in clinic for a routine telephone follow-up appointment after surgery. Patient has past medical history  significant for HLD, HTN, DM II, CAD s/p PCI to RCA and LCx in 2010, hidradenitis suppurativa in the presacral area, ETOH abuse/use, and tobacco abuse. Hospital course was remarkable for A-Flutter with RVR and BBB. Patient was discharged to TCU on 2/1 /20. He is staying at his daughters house now.    Patient has been doing well since discharge and now returns to clinic for postop telephone visit.    Patient reports incision is healing well.    Patient denies any fever, chills, chest pain, palpitations, edema, SOB, lightheadedness, nausea and vomiting.    Patient is having Normal bowel movements and voiding without problems.    Has not been attending cardiac rehabilitation but walking everyday on his own.      Patient is on Coumadin and INR is therapeutic.  Weight has been stable overall.    Blood glucose levels have been under good control.      PAST MEDICAL HISTORY:  Past Medical History:   Diagnosis Date     Acute systolic heart failure (H)     EF 10%     CAD (coronary artery disease)   Chief Complaint   Patient presents with   • Follow-up     Labs     Patient is here for follow up on multiple medical problems.    1. Hypertension   2. Dyslipidemia   3. Chronic kidney disease, stage 2   4. Coronary artery disease   5. Hypothyroidism   6. Prediabetes   7. Gastroesophageal reflux disease   8. Tobacco abuse   9. Colonoscopy refused     HISTORY OF PRESENT ILLNESS:  The patient is a 61 year old male who is here today for follow up of multiple medical problems:    1. Hypertension:  Blood pressure today is 134/70.  Medications are reviewed in Smart Chart.  Patient is tolerating medications well.  Patient denies any chest pain, palpitations or shortness of breath.  No dizziness, syncope or near syncope symptoms. No TIA (transient ischemic attack) or CVA (cerebrovascular accident)-like symptoms. Most recent lab work shows   Creatinine (mg/dL)   Date Value   11/12/2021 1.11   08/06/2021 1.03   ,   BUN (mg/dL)   Date Value   11/12/2021 18   08/06/2021 18   ,   Sodium (mmol/L)   Date Value   11/12/2021 139   08/06/2021 139   ,   Potassium (mmol/L)   Date Value   11/12/2021 4.4   08/06/2021 4.3   .  The rest of lab work was reviewed per Smart Chart.    2. Dyslipidemia:  Patient's most recent lipid panel shows   Cholesterol (mg/dL)   Date Value   11/12/2021 121   08/06/2021 130      LDL (mg/dL)   Date Value   11/12/2021 62   08/06/2021 58   ,   HDL (mg/dL)   Date Value   11/12/2021 31 (L)   08/06/2021 30 (L)   ,   Triglycerides (mg/dL)   Date Value   11/12/2021 141   08/06/2021 211 (H)   ,   Cholesterol/ HDL Ratio (no units)   Date Value   11/12/2021 3.9   08/06/2021 4.3   ,   GPT/ALT (Units/L)   Date Value   11/12/2021 22   08/06/2021 24   ,   GOT/AST (Units/L)   Date Value   11/12/2021 14   08/06/2021 18    were reviewed and discussed with patient.  Medications reviewed in Smart Chart, patient is tolerating medications well.  Patient is trying to watch the diet.  No abdominal pain or jaundice.  No muscle aches      Diabetes mellitus, type 2 (H)     uncontrolled     Essential hypertension      Hidradenitis suppurativa     presacral area     Hyperlipidemia LDL goal <100      NSTEMI (non-ST elevated myocardial infarction) (H)        PAST SURGICAL HISTORY:  Past Surgical History:   Procedure Laterality Date     ANGIOGRAM      2010, 2021     BYPASS GRAFT ARTERY CORONARY N/A 1/22/2021    Procedure: Median Sternotomy, Takedown of Left Internal Mammary Artery, Endovein Burley of Left Greater Saphenous Vein, Coronary Artery Bypass Grafting x3, On Cardiopulmonary Bypass, Transesophageal Echocardiogram per Anesthesia, Blackwell Catheter Insertion per Urology;  Surgeon: Jose Gibbons MD;  Location: [facility] OR     CV INTRA-AORTIC BALLOON PUMP INSERTION N/A 1/21/2021    Procedure: CV INTRA-AORTIC BALLOON PUMP INSERTION;  Surgeon: Dustin Nuñez MD;  Location:  HEART CARDIAC CATH LAB     CV SWAN RENETTA PROCEDURE N/A 1/21/2021    Procedure: Oscar Renetta Procedure;  Surgeon: Dustin Nuñez MD;  Location:  HEART CARDIAC CATH LAB       CURRENT MEDICATIONS:   Current Outpatient Medications   Medication     acetaminophen (TYLENOL) 325 MG tablet     albuterol (PROAIR HFA/PROVENTIL HFA/VENTOLIN HFA) 108 (90 Base) MCG/ACT inhaler     amiodarone (PACERONE) 200 MG tablet     aspirin 81 MG EC tablet     atorvastatin (LIPITOR) 40 MG tablet     benzoyl peroxide 5 % external liquid     folic acid (FOLVITE) 1 MG tablet     furosemide (LASIX) 40 MG tablet     furosemide (LASIX) 40 MG tablet     insulin glargine (LANTUS PEN) 100 UNIT/ML pen     lisinopril (ZESTRIL) 5 MG tablet     melatonin 1 MG TABS tablet     multivitamin w/minerals (THERA-VIT-M) tablet     pantoprazole (PROTONIX) 40 MG EC tablet     polyethylene glycol (MIRALAX) 17 GM/Dose powder     potassium chloride ER (KLOR-CON M) 20 MEQ CR tablet     senna-docusate (SENOKOT-S/PERICOLACE) 8.6-50 MG tablet     thiamine (B-1) 100 MG tablet     warfarin ANTICOAGULANT (COUMADIN)  or pains.    3. Chronic kidney disease stage 2:  Patient is clinically stable, denies any dysuria or hematuria, no flank pain.  Patient is currently not taking any nonsteroidal anti-inflammatory drugs on regular basis.  Most recent lab work shows   Creatinine (mg/dL)   Date Value   11/12/2021 1.11   08/06/2021 1.03   ,   BUN (mg/dL)   Date Value   11/12/2021 18   08/06/2021 18   ,   GFR Estimate, Non  (no units)   Date Value   01/02/2020 82   02/26/2019 72       GFR Estimate,  (no units)   Date Value   01/02/2020 >90   02/26/2019 83      Potassium (mmol/L)   Date Value   11/12/2021 4.4   08/06/2021 4.3   ,   Sodium (mmol/L)   Date Value   11/12/2021 139   08/06/2021 139   ,   Microalbumin/ Creatinine Ratio (mg/g)   Date Value   08/06/2021 7.5   11/12/2020 5.7    and were discussed with the patient.  Medications were reviewed in Smart Chart.    4. Coronary artery disease:  Patient is clinically stable. He denies any chest pain or shortness of breath, no syncope or near syncopal symptoms.  No TIA (transient ischemic attack) or CVA (cerebrovascular accident)-like symptoms and no walking claudications.  He is tolerating his medications well.  Medications reviewed in Epic.    12/8/2020 ECHO STRESS shows:  No echocardiographic evidence of myocardial ischemia.  Abnormal stress ECG stress test.  Good exercise tolerance, achieving 10.1 METS and 86% of max predicted heart rate.  Normal exercise hemodynamics  No chest pain during exercise. Test terminated due to dyspnea and fatigue  Compared to prior 2019 TTE, apical dyskinesis is no longer seen.    5. Hypothyroidism:  Patient is taking medications regularly.  Medications were reviewed in the Smart Chart.  No heat or cold intolerance.  No tremors, no palpitation, no recent significant weight change.  Most recent lab work shows   TSH (mcUnits/mL)   Date Value   11/12/2021 1.558   08/06/2021 8.094 (H)     6. Prediabetes:  Patient's most recent  1 MG tablet     warfarin ANTICOAGULANT (COUMADIN) 2 MG tablet     amiodarone (PACERONE) 400 MG tablet     No current facility-administered medications for this visit.        ALLERGIES:    No Known Allergies      ROS:  Review of symptoms otherwise negative unless commented about in HPI.     LABS:  Last Basic Metabolic Panel:  Lab Results   Component Value Date     02/05/2021      Lab Results   Component Value Date    POTASSIUM 3.7 02/05/2021     Lab Results   Component Value Date    CHLORIDE 105 02/05/2021     Lab Results   Component Value Date    RICHARD 8.7 02/05/2021     Lab Results   Component Value Date    CO2 30 02/05/2021     Lab Results   Component Value Date    BUN 14 02/05/2021     Lab Results   Component Value Date    CR 0.65 02/05/2021     Lab Results   Component Value Date     02/05/2021       Last CBC:   Lab Results   Component Value Date    WBC 14.2 02/05/2021     Lab Results   Component Value Date    RBC 3.71 02/05/2021     Lab Results   Component Value Date    HGB 11.5 02/05/2021     Lab Results   Component Value Date    HCT 35.8 02/05/2021     No components found for: MCT  Lab Results   Component Value Date    MCV 97 02/05/2021     Lab Results   Component Value Date    MCH 31.0 02/05/2021     Lab Results   Component Value Date    MCHC 32.1 02/05/2021     Lab Results   Component Value Date    RDW 12.7 02/05/2021     Lab Results   Component Value Date     02/05/2021       INR:  Lab Results   Component Value Date    INR 2.02 02/05/2021    INR 1.95 02/04/2021    INR 1.96 02/03/2021    INR 2.42 02/02/2021    INR 2.69 02/01/2021    INR 2.67 01/31/2021    INR 2.31 01/30/2021    INR 1.69 01/29/2021    INR 1.28 01/28/2021    INR 1.24 01/27/2021    INR 1.50 01/22/2021    INR 1.54 01/22/2021         IMAGING: None    PHYSICAL EXAM:   There were no vitals taken for this visit. No exam for this visit.  General: alert and oriented x 3, pleasant, normal mood and affect while on the phone.  Neuro: no  blood work is   Glucose (mg/dL)   Date Value   11/12/2021 86   08/06/2021 100 (H)   04/23/2021 92   , and   Hemoglobin A1C (%)   Date Value   11/12/2021 5.7 (H)   11/12/2020 5.5   , and was discussed with the patient.  Patient is trying to watch the diet.  No polyuria, polydipsia or polyphagia.  No recent significant change in diet.     7. Gastroesophageal reflux disease:  Patient is doing generally well, denies any recent flare-up of acid reflux.  No abdominal pain, no melena, no hematochezia, no hematemesis or hemoptysis.  Patient is tolerating medications well.  This was reviewed in Smart Chart.  Patient is currently not taking any nonsteroidal anti-inflammatory drugs on a regular basis.     8. Tobacco abuse: Patient reports that he has been smoking cigarettes. He has a 8.50 pack-year smoking history. He has never used smokeless tobacco.     9. Colonoscopy refused: Patient was counseled on the importance of colonoscopy, but has declined scheduling one at this time due to time constraints. Patient verbalized understanding of the potential risk of colon cancer and its complications including death.        Lab Services on 11/12/2021   Component Date Value Ref Range Status   • Fasting Status 11/12/2021 12  0 - 999 Hours Final   • Sodium 11/12/2021 139  135 - 145 mmol/L Final   • Potassium 11/12/2021 4.4  3.4 - 5.1 mmol/L Final   • Chloride 11/12/2021 107  98 - 107 mmol/L Final   • Carbon Dioxide 11/12/2021 28  21 - 32 mmol/L Final   • Anion Gap 11/12/2021 8* 10 - 20 mmol/L Final   • Glucose 11/12/2021 86  70 - 99 mg/dL Final   • BUN 11/12/2021 18  6 - 20 mg/dL Final   • Creatinine 11/12/2021 1.11  0.67 - 1.17 mg/dL Final   • Glomerular Filtration Rate 11/12/2021 72  >=60 Final    eGFR results = or >60 mL/min/1.73m2 = Normal kidney function. Estimated GFR calculated using the 2009 CKD-EPI creatinine equation.     • BUN/ Creatinine Ratio 11/12/2021 16  7 - 25 Final   • Calcium 11/12/2021 8.9  8.4 - 10.2 mg/dL Final   •  Bilirubin, Total 11/12/2021 0.4  0.2 - 1.0 mg/dL Final   • GOT/AST 11/12/2021 14  <=37 Units/L Final   • GPT/ALT 11/12/2021 22  <64 Units/L Final   • Alkaline Phosphatase 11/12/2021 58  45 - 117 Units/L Final   • Albumin 11/12/2021 3.5* 3.6 - 5.1 g/dL Final   • Protein, Total 11/12/2021 7.2  6.4 - 8.2 g/dL Final   • Globulin 11/12/2021 3.7  2.0 - 4.0 g/dL Final   • A/G Ratio 11/12/2021 0.9* 1.0 - 2.4 Final   • Hemoglobin A1C 11/12/2021 5.7* 4.5 - 5.6 % Final      Diabetic Screening  Non Diabetic:             <5.7%  Increased Risk:           5.7-6.4%  Diagnostic For Diabetes:  >6.4%    Diabetic Control  A1C%       eAG mg/dL  6.0            126  6.5            140  7.0            154  7.5            169  8.0            183  8.5            197  9.0            212  9.5            226  10.0           240   • COLOR, URINALYSIS 11/12/2021 Yellow   Final   • APPEARANCE, URINALYSIS 11/12/2021 Clear   Final   • GLUCOSE, URINALYSIS 11/12/2021 Negative  Negative mg/dL Final   • BILIRUBIN, URINALYSIS 11/12/2021 Negative  Negative Final   • KETONES, URINALYSIS 11/12/2021 Negative  Negative mg/dL Final   • SPECIFIC GRAVITY, URINALYSIS 11/12/2021 >=1.030  1.005 - 1.030 Final   • OCCULT BLOOD, URINALYSIS 11/12/2021 Negative  Negative Final   • PH, URINALYSIS 11/12/2021 6.5  5.0 - 7.0 Final   • PROTEIN, URINALYSIS 11/12/2021 Negative  Negative mg/dL Final   • UROBILINOGEN, URINALYSIS 11/12/2021 0.2  0.2, 1.0 mg/dL Final   • NITRITE, URINALYSIS 11/12/2021 Negative  Negative Final   • LEUKOCYTE ESTERASE, URINALYSIS 11/12/2021 Negative  Negative Final   • SQUAMOUS EPITHELIAL, URINALYSIS 11/12/2021 1 to 5  None Seen, 1 to 5 /hpf Final   • ERYTHROCYTES, URINALYSIS 11/12/2021 None Seen  None Seen, 1 to 2 /hpf Final   • LEUKOCYTES, URINALYSIS 11/12/2021 1 to 5  None Seen, 1 to 5 /hpf Final   • BACTERIA, URINALYSIS 11/12/2021 None Seen  None Seen /hpf Final   • HYALINE CASTS, URINALYSIS 11/12/2021 None Seen  None Seen, 1 to 5 /lpf Final   •  focal deficits noted  CV: no peripheral edema per pt  Pulm: easy work of breathing while talking.  Incision: incisions clean dry and intact without erythema, swelling or drainage per pt.    PROCEDURES: None       ASSESSMENT/PLAN:  Jaxon Melendez is a 50 year old year old male status post CABG x 3 who returns to clinic for postop telephone visit.     1. Surgically doing well overall.  Incisions are healing well with no signs of infection. Increasing activity and strength overall.   2. Hemodynamics are stable. No medication changes were needed today.  3. Follow up with your cardiologist, Dr Deutsch's office, as scheduled on 3/4/20.  4. Encouraged Outpatient Cardiac Rehab.   5. Continue sternal precautions for 12 weeks from surgery date.   6. No driving for 4 weeks from surgery date.   7. Follow up with PCP as scheduled 2/19/21.      The total time spent with the patient was 25 minutes, > 50% of which was spent in counseling.        Please do not hesitate to contact me if you have any questions/concerns.     Sincerely,     Cardiovascular Thoracic Surgery     Fasting Status 11/12/2021 12  0 - 999 Hours Final   • Cholesterol 11/12/2021 121  <=199 mg/dL Final    Desirable         <200  Borderline High   200 to 239  High              >=240   • Triglycerides 11/12/2021 141  <=149 mg/dL Final    Normal            <150  Borderline High   150 to 199  High              200 to 499  Very High         >=500   • HDL 11/12/2021 31* >=40 mg/dL Final    Low              <40  Borderline Low   40 to 49  Near Optimal     50 to 59  Optimal          >=60   • LDL 11/12/2021 62  <=129 mg/dL Final    OPTIMAL           <100  NEAR OPTIMAL      100 to 129  BORDERLINE HIGH   130 to 159  HIGH              160 to 189  VERY HIGH         >=190   • Non-HDL Cholesterol 11/12/2021 90  mg/dL Final    Therapeutic Target:  CHD and risk equivalents  <130  Multiple risk factors     <160  0 to 1 risk factor        <190   • Cholesterol/ HDL Ratio 11/12/2021 3.9  <=4.4 Final   • TSH 11/12/2021 1.558  0.350 - 5.000 mcUnits/mL Final   • WBC 11/12/2021 5.9  4.2 - 11.0 K/mcL Final   • RBC 11/12/2021 4.66  4.50 - 5.90 mil/mcL Final   • HGB 11/12/2021 15.4  13.0 - 17.0 g/dL Final   • HCT 11/12/2021 45.9  39.0 - 51.0 % Final   • MCV 11/12/2021 98.5  78.0 - 100.0 fl Final   • MCH 11/12/2021 33.0  26.0 - 34.0 pg Final   • MCHC 11/12/2021 33.6  32.0 - 36.5 g/dL Final   • RDW-CV 11/12/2021 13.3  11.0 - 15.0 % Final   • RDW-SD 11/12/2021 47.1  39.0 - 50.0 fL Final   • PLT 11/12/2021 196  140 - 450 K/mcL Final   • Neutrophil, Percent 11/12/2021 51  % Final   • Lymphocytes, Percent 11/12/2021 24  % Final   • Mono, Percent 11/12/2021 14  % Final   • Eosinophils, Percent 11/12/2021 10  % Final   • Basophils, Percent 11/12/2021 1  % Final   • Absolute Neutrophils 11/12/2021 3.1  1.8 - 7.7 K/mcL Final   • Absolute Lymphocytes 11/12/2021 1.4  1.0 - 4.0 K/mcL Final   • Absolute Monocytes 11/12/2021 0.8  0.3 - 0.9 K/mcL Final   • Absolute Eosinophils  11/12/2021 0.6* 0.0 - 0.5 K/mcL Final   • Absolute Basophils 11/12/2021 0.1  0.0 -  0.3 K/mcL Final       Results for orders placed or performed during the hospital encounter of 11/20/18   Electrocardiogram 12-Lead   Result Value Ref Range    Ventricular Rate EKG/Min (BPM) 72     Atrial Rate (BPM) 72     OR-Interval (MSEC) 158     QRS-Interval (MSEC) 90     QT-Interval (MSEC) 422     QTc 462     P Axis (Degrees) 48     R Axis (Degrees) 26     T Axis (Degrees) 60     REPORT TEXT       Normal sinus rhythm  Anteroseptal infarct  , age undetermined  No previous ECGs available  Confirmed by BRAULIO JUNG VIKRAM ([id number]) on 11/20/2018 10:47:18 PM         REVIEW OF SYSTEMS:  All systems are reviewed and are essentially negative except for as per history of present illness.    PAST MEDICAL HISTORY:  Past Medical History:   Diagnosis Date   • Benign essential hypertension    • CAD (coronary artery disease)    • Dyslipidemia    • Hashimoto's thyroiditis    • Hypothyroidism        PAST SURGICAL HISTORY:  Past Surgical History:   Procedure Laterality Date   • Coronary angioplasty with stent placement         SOCIAL HISTORY:  Social History     Socioeconomic History   • Marital status: /Civil Union     Spouse name: Not on file   • Number of children: 1   • Years of education: Not on file   • Highest education level: Not on file   Occupational History     Comment:    Tobacco Use   • Smoking status: Current Some Day Smoker     Packs/day: 0.25     Years: 34.00     Pack years: 8.50     Types: Cigarettes   • Smokeless tobacco: Never Used   Substance and Sexual Activity   • Alcohol use: Yes     Comment: Used to drink heavily currently drinks 2-3 drinks per year   • Drug use: No   • Sexual activity: Yes     Partners: Female   Other Topics Concern   • Not on file   Social History Narrative   • Not on file     Social Determinants of Health     Financial Resource Strain:    • Social Determinants: Financial Resource Strain: Not on file   Food Insecurity:    • Social Determinants: Food Insecurity: Not on file    Transportation Needs:    • Lack of Transportation (Medical): Not on file   • Lack of Transportation (Non-Medical): Not on file   Physical Activity:    • Days of Exercise per Week: Not on file   • Minutes of Exercise per Session: Not on file   Stress:    • Social Determinants: Stress: Not on file   Social Connections:    • Social Determinants: Social Connections: Not on file   Intimate Partner Violence:    • Social Determinants: Intimate Partner Violence Past Fear: Not on file   • Social Determinants: Intimate Partner Violence Current Fear: Not on file       FAMILY HISTORY:   Family History   Problem Relation Age of Onset   • Heart disease Mother          at age 38 from premature coronary artery disease   • Heart disease Father         CAD/ MI at age 43   • Diabetes Father    • Heart disease Brother    • Diabetes Brother    • Cancer Maternal Aunt         lung   • Diabetes Maternal Uncle    • Diabetes Maternal Grandmother        Current Outpatient Medications   Medication Sig Dispense Refill   • losartan (COZAAR) 50 MG tablet Take 1 tablet by mouth once daily 90 tablet 0   • levothyroxine 175 MCG tablet Take 1 tablet by mouth daily. New dose. 90 tablet 1   • clopidogrel (Plavix) 75 MG tablet Take 1 tablet by mouth daily. 30 tablet 11   • carvedilol (COREG) 6.25 MG tablet Take 1 tablet by mouth 2 times daily (with meals). 180 tablet 3   • aspirin 81 MG chewable tablet Chew 1 tablet by mouth daily. 30 tablet 11   • pantoprazole (PROTONIX) 40 MG tablet Take 1 tablet by mouth nightly. 90 tablet 3   • rosuvastatin (CRESTOR) 20 MG tablet Take 1 tablet by mouth daily. 30 tablet 11     No current facility-administered medications for this visit.       ALLERGIES:   Allergen Reactions   • Atorvastatin WEAKNESS         PHYSICAL EXAMINATION:   GENERAL:  The patient is awake, alert, oriented x3, in no acute distress.   Blood pressure 134/70, pulse 60, temperature 97.6 °F (36.4 °C), temperature source Oral, resp. rate 14,  height 5' 9\" (1.753 m), weight 88.5 kg (195 lb), SpO2 97 %.  HEENT:  Pupils equal, round, reactive to light and accommodation.  Extraocular muscles are intact.  Sclerae anicteric.  Conjunctivae clear.  Oropharynx is clear.  No ulcers, no exudate, no erythema, no abscess formation.  Tympanic membranes are clear and intact bilaterally.  No frontal or maxillary sinus tenderness bilaterally.  No mastoid tenderness bilaterally.  External auditory canals are clear bilaterally.  Airway is intact.   NECK:  Supple, no jugular venous distention.  No submandibular, cervical or supraclavicular lymphadenopathy.  No carotid bruits.  Normal symmetric carotid upstrokes.  No thyromegaly.  No thyroid nodules.  No palpable mass.   HEART:  Regular rate and rhythm.  S1 and S2 are normal.  No S3, S4, no murmur, no rub.   LUNGS:  Clear to auscultation bilaterally.  No rales or rhonchi with good air entry bilaterally.  No respiratory distress.   ABDOMEN:  Soft, normoactive bowel sounds.  No tenderness.  No rebound tenderness. No palpable mass.  No pulsatile mass.  No hepatosplenomegaly.  No inguinal lymphadenopathy.  No CVA (costovertebral angle) tenderness bilaterally.   EXTREMITIES:  All 4 extremities with full range of movement.  No edema, no calf tenderness bilaterally, posterior tibial 2+ and equal bilaterally.   NEUROLOGICAL:  Cranial nerves 2-12 are intact.  Motor is 5/5 and equal in bilateral upper and lower extremities.  Deep tendon reflexes 2+ and equal in bilateral upper and lower extremities and symmetric.  Gait is steady.  Sensation is intact.  No acute motor or sensory deficits.   SKIN:  Warm and dry.  No rash.  No petechiae.     ASSESSMENT:     Orders Placed This Encounter   • CBC with Automated Differential   • Comprehensive Metabolic Panel   • Glycohemoglobin   • Lipid Panel Without Reflex   • Thyroid Stimulating Hormone   • Urinalysis With Microscopy & Culture If Indicated       Patient is eligible for the COVID-19 Pfizer  booster vaccination per CDC guidelines which is available at the clinic, he was recommended to schedule an appointment with the COVID-19 clinic.    (I10) Benign essential hypertension  (primary encounter diagnosis)  Plan: CBC WITH DIFFERENTIAL, COMPREHENSIVE METABOLIC         PANEL, LIPID PANEL WITHOUT REFLEX, THYROID         STIMULATING HORMONE, URINALYSIS WITH MICROSCOPY        & CULTURE IF INDICATED  Patient is clinically stable. Continue current management. Advised patient to monitor blood pressure. Reevaluate after lab work in 3 months.     (I25.10) Coronary artery disease involving native coronary artery of native heart without angina pectoris  Plan: LIPID PANEL WITHOUT REFLEX  Patient is clinically stable. Continue current management and modify risk factors.     (E78.2) Mixed dyslipidemia  Plan: COMPREHENSIVE METABOLIC PANEL, LIPID PANEL         WITHOUT REFLEX  Advised patient to continue to watch diet. Continue current management. Reevaluate in 3 months.     (E03.9) Hypothyroidism, unspecified type  Plan: THYROID STIMULATING HORMONE  Patient is clinically stable.  Continue current management.  Monitor TSH annually.     (K21.9) Gastroesophageal reflux disease, unspecified whether esophagitis present  Patient is clinically stable and asymptomatic, no recent flare-up of symptoms. Continue current management. Reevaluate in 3 months.    (R73.03) Prediabetes  Plan: COMPREHENSIVE METABOLIC PANEL, GLYCOHEMOGLOBIN  Advised patient to continue to watch diet. Continue current management. Reevaluate in 3 months.     (N18.2) CKD (chronic kidney disease) stage 2, GFR 60-89 ml/min  Plan: CBC WITH DIFFERENTIAL, COMPREHENSIVE METABOLIC         PANEL, URINALYSIS WITH MICROSCOPY & CULTURE IF         INDICATED  Patient is clinically stable. Advised patient to avoid nephrotoxic medications, particularly nonsteroidal anti-inflammatory drugs and maintain hydration with oral fluids. Reevaluate in 3 months.     (Z72.0) Tobacco  abuse  I counseled patient for tobacco cessation. Discussed with patient potential complications of tobacco smoking. We will re-discuss again next visit.     (Z53.20) Colonoscopy refused  Patient was counseled on the importance of colonoscopy, but has declined scheduling one at this time. Patient verbalized understanding of the potential risk of colon cancer and its complications including death.            On 11/29/2021, ILizy scribed the services personally performed by Elmer Marcos MD.     The documentation recorded by the scribe accurately and completely reflects the service(s) I personally performed and the decisions made by me.

## 2021-12-05 ENCOUNTER — HEALTH MAINTENANCE LETTER (OUTPATIENT)
Age: 51
End: 2021-12-05

## 2022-03-27 ENCOUNTER — HEALTH MAINTENANCE LETTER (OUTPATIENT)
Age: 52
End: 2022-03-27

## 2022-07-17 ENCOUNTER — HEALTH MAINTENANCE LETTER (OUTPATIENT)
Age: 52
End: 2022-07-17

## 2022-09-25 ENCOUNTER — HEALTH MAINTENANCE LETTER (OUTPATIENT)
Age: 52
End: 2022-09-25

## 2023-01-30 ENCOUNTER — HEALTH MAINTENANCE LETTER (OUTPATIENT)
Age: 53
End: 2023-01-30

## 2023-05-08 ENCOUNTER — HEALTH MAINTENANCE LETTER (OUTPATIENT)
Age: 53
End: 2023-05-08

## 2023-10-14 ENCOUNTER — HEALTH MAINTENANCE LETTER (OUTPATIENT)
Age: 53
End: 2023-10-14

## 2024-03-02 ENCOUNTER — HEALTH MAINTENANCE LETTER (OUTPATIENT)
Age: 54
End: 2024-03-02

## (undated) DEVICE — BLADE KNIFE BEAVER MICROSHARP GREEN 377515

## (undated) DEVICE — CONNECTOR DRAIN CHEST Y EXTENSION SET 19909

## (undated) DEVICE — KIT RIGHT HEART CATH 60130719

## (undated) DEVICE — TIES BANDING T50R

## (undated) DEVICE — SUCTION MANIFOLD DORNOCH ULTRA CART UL-CL500

## (undated) DEVICE — SU STEEL 6 CCS 4X18" M654G

## (undated) DEVICE — SU PROLENE 4-0 SHDA 36" 8521H

## (undated) DEVICE — KIT ENDO VASOVIEW HEMOPRO 2 VH-4000

## (undated) DEVICE — SUCTION SLEEVE NEPTUNE 2 165MM 0703-005-165

## (undated) DEVICE — SU ETHIBOND 3-0 BBDA 36" X588H

## (undated) DEVICE — SOL NACL 0.9% IRRIG 3000ML BAG 2B7477

## (undated) DEVICE — BLADE KNIFE SURG 15C 371716

## (undated) DEVICE — SU PROLENE 5-0 RB-2DA 30" 8710H

## (undated) DEVICE — SU ETHIBOND 0 CT-1 CR 8X18" CX21D

## (undated) DEVICE — BNDG ELASTIC 4"X5YDS STERILE 6611-4S

## (undated) DEVICE — DRSG TEGADERM 4X4 3/4" 1626W

## (undated) DEVICE — CANISTER WOUND VAC W/GEL 1000ML M8275093/5

## (undated) DEVICE — NDL BLUNT 18GA 1" W/O FILTER 305181

## (undated) DEVICE — SU MONOCRYL 4-0 PS-2 27" UND Y426H

## (undated) DEVICE — PACK ADULT HEART UMMC PV15CG92D

## (undated) DEVICE — LINEN TOWEL PACK X30 5481

## (undated) DEVICE — ESU ELEC BLADE 2.75" COATED/INSULATED E1455

## (undated) DEVICE — CLIP HORIZON LG ORANGE 004200

## (undated) DEVICE — SPONGE LAP 18X18" X8435

## (undated) DEVICE — SU PLEDGET SOFT TFE 3/8"X3/26"X1/16" PCP40

## (undated) DEVICE — SOL NACL 0.9% 10ML VIAL 0409-4888-02

## (undated) DEVICE — SPONGE RAY-TEC 4X8" 7318

## (undated) DEVICE — PACK HEART LEFT CUSTOM

## (undated) DEVICE — LINEN TOWEL PACK X6 WHITE 5487

## (undated) DEVICE — SUCTION CATH AIRLIFE TRI-FLO W/CONTROL PORT 14FR  T60C

## (undated) DEVICE — DRAPE IOBAN INCISE 23X17" 6650EZ

## (undated) DEVICE — CLIP HORIZON MED BLUE 002200

## (undated) DEVICE — SOL NACL 0.9% IRRIG 1000ML BOTTLE 2F7124

## (undated) DEVICE — BNDG ELASTIC 6"X5YDS STERILE 6611-6S

## (undated) DEVICE — SU PROLENE 7-0 BV-1DA 4X24" M8702

## (undated) DEVICE — DRSG WOUND VAC SPONGE MED BLACK M8275052/5

## (undated) DEVICE — GLOVE PROTEXIS POWDER FREE 7.0 ORTHOPEDIC 2D73ET70

## (undated) DEVICE — ADH SKIN CLOSURE PREMIERPRO EXOFIN 1.0ML 3470

## (undated) DEVICE — CATH FOLYSIL 14FR 10ML AA6114

## (undated) DEVICE — BLADE KNIFE BEAVER MINI STR BEAVER6900

## (undated) DEVICE — DRAIN CHEST TUBE 36FR STR 8036

## (undated) DEVICE — SU VICRYL 0 CTX 36" J370H

## (undated) DEVICE — SURGICEL POWDER ABSORBABLE HEMOSTAT 3GM 3013SP

## (undated) DEVICE — BLADE CLIPPER SGL USE 9680

## (undated) DEVICE — SU PROLENE 7-0 BV-1DA 24" 8702H

## (undated) DEVICE — GUIDEWIRE L80CM OD.035IN 3 CM J-TIP V

## (undated) DEVICE — CLIP HORIZON SM RED WIDE SLOT 001201

## (undated) DEVICE — SU ETHIBOND 2-0 SHDA 30" X563H

## (undated) DEVICE — CANNULA VESSEL DLP  30001

## (undated) DEVICE — DRSG ADAPTIC 3X16"  6114

## (undated) DEVICE — PREP CHLORAPREP 26ML TINTED ORANGE  260815

## (undated) DEVICE — ANTIFOG SOLUTION W/FOAM PAD 31142527

## (undated) DEVICE — DRSG DRAIN 4X4" 7086

## (undated) DEVICE — SUCTION DRY CHEST DRAIN OASIS 3600-100

## (undated) DEVICE — PROTECTOR ARM ONE-STEP TRENDELENBURG 40418

## (undated) DEVICE — BLADE SAW STERNAL 20X30MM KM-32

## (undated) DEVICE — SU PROLENE 6-0 C-1DA 30" 8706H

## (undated) DEVICE — INTRO SHEATH MICRO PLATINUM TIP 4FRX40CM 7274

## (undated) DEVICE — DECANTER BAG 2002S

## (undated) DEVICE — CLIP SPRING FOGARTY SOFTJAW CSOFT6

## (undated) DEVICE — ESU PENCIL SMOKE EVAC W/ROCKER SWITCH 0703-047-000

## (undated) DEVICE — WIPES FOLEY CARE SURESTEP PROVON DFC100

## (undated) DEVICE — WAND SUCTION LP SOFT 15.2CM SU-22702

## (undated) DEVICE — NDL COUNTER 40CT  31142311

## (undated) DEVICE — SURGICEL HEMOSTAT 4X8" 1952

## (undated) DEVICE — ESU HOLSTER PLASTIC DISP E2400

## (undated) DEVICE — PUNCH AORTIC 4.0MMX8" RCB40

## (undated) DEVICE — SYR 01ML 27GA 0.5" NDL TBC 309623

## (undated) DEVICE — DRSG ABDOMINAL 07 1/2X8" 7197D

## (undated) DEVICE — SU SILK 0 TIE 6X30" A306H

## (undated) DEVICE — Device

## (undated) DEVICE — SPONGE LAP 12X12" X8425

## (undated) DEVICE — RX SURGIFLO HEMOSTATIC MATRIX W/THROMBIN 8ML 2994

## (undated) DEVICE — DEFIB PRO-PADZ LVP LQD GEL ADULT 8900-2105-01

## (undated) DEVICE — SU RETRACT-O-TAPE 1041

## (undated) DEVICE — KIT INTRODUCER DL 9FR 11.5CM J-TIP 0.35"X45CM CDC-21242-1A

## (undated) DEVICE — SPECIMEN CONTAINER 5OZ STERILE 2600SA

## (undated) DEVICE — SOL NACL 0.9% INJ 1000ML BAG 2B1324X

## (undated) DEVICE — SU VICRYL 2-0 CT-1 27" UND J259H

## (undated) DEVICE — ESU ELEC BLADE 6" COATED/INSULATED E1455-6

## (undated) DEVICE — TUBING INSUFFLATION W/FILTER CPC TO LUER 620-030-301

## (undated) DEVICE — DRAPE SLUSH/WARMER 66X44" ORS-320

## (undated) DEVICE — ESU PENCIL W/COATED BLADE E2450H

## (undated) DEVICE — SU PROLENE 4-0 RB-1DA 36" 8557H

## (undated) DEVICE — LEAD PACER MYOCARDIAL BIPOLAR TEMPORARY 53CM 6495F

## (undated) DEVICE — CATH SWAN CCO/SV02/CEDV 8FR 110CM W/HEP 777F8

## (undated) DEVICE — DRSG GAUZE 4X4" TRAY 6939

## (undated) DEVICE — DRAIN CHEST TUBE 28FR STR 8028

## (undated) DEVICE — TAPE MEDIPORE 4"X2YD 2864

## (undated) DEVICE — PREP CHLORHEXIDINE 4% 4OZ (HIBICLENS) 57504

## (undated) DEVICE — INTRO SHEATH 9FRX10CM PINNACLE RSS902

## (undated) DEVICE — SU STEEL MYO/WIRE II STERNOTOMY 8 BE-1 3X14" 048-217

## (undated) DEVICE — SYR 10ML LL W/O NDL 302995

## (undated) DEVICE — SU PROLENE 6-0 C-1DA 4X24" M8726

## (undated) RX ORDER — HEPARIN SODIUM 1000 [USP'U]/ML
INJECTION, SOLUTION INTRAVENOUS; SUBCUTANEOUS
Status: DISPENSED
Start: 2021-01-21

## (undated) RX ORDER — ASPIRIN 325 MG
TABLET ORAL
Status: DISPENSED
Start: 2021-01-21

## (undated) RX ORDER — HEPARIN SODIUM 1000 [USP'U]/ML
INJECTION, SOLUTION INTRAVENOUS; SUBCUTANEOUS
Status: DISPENSED
Start: 2021-01-22

## (undated) RX ORDER — FENTANYL CITRATE 50 UG/ML
INJECTION, SOLUTION INTRAMUSCULAR; INTRAVENOUS
Status: DISPENSED
Start: 2021-01-22

## (undated) RX ORDER — FENTANYL CITRATE 50 UG/ML
INJECTION, SOLUTION INTRAMUSCULAR; INTRAVENOUS
Status: DISPENSED
Start: 2021-01-21

## (undated) RX ORDER — LIDOCAINE HYDROCHLORIDE 20 MG/ML
INJECTION, SOLUTION EPIDURAL; INFILTRATION; INTRACAUDAL; PERINEURAL
Status: DISPENSED
Start: 2021-01-22

## (undated) RX ORDER — PROPOFOL 10 MG/ML
INJECTION, EMULSION INTRAVENOUS
Status: DISPENSED
Start: 2021-01-22

## (undated) RX ORDER — CEFAZOLIN SODIUM 1 G/3ML
INJECTION, POWDER, FOR SOLUTION INTRAMUSCULAR; INTRAVENOUS
Status: DISPENSED
Start: 2021-01-22

## (undated) RX ORDER — DEXTROSE MONOHYDRATE 25 G/50ML
INJECTION, SOLUTION INTRAVENOUS
Status: DISPENSED
Start: 2021-01-22

## (undated) RX ORDER — SODIUM CHLORIDE 9 MG/ML
INJECTION, SOLUTION INTRAVENOUS
Status: DISPENSED
Start: 2021-01-21

## (undated) RX ORDER — HYDROMORPHONE HYDROCHLORIDE 1 MG/ML
INJECTION, SOLUTION INTRAMUSCULAR; INTRAVENOUS; SUBCUTANEOUS
Status: DISPENSED
Start: 2021-01-22

## (undated) RX ORDER — POTASSIUM CHLORIDE 750 MG/1
TABLET, EXTENDED RELEASE ORAL
Status: DISPENSED
Start: 2021-01-21